# Patient Record
Sex: FEMALE | Race: WHITE | NOT HISPANIC OR LATINO | URBAN - METROPOLITAN AREA
[De-identification: names, ages, dates, MRNs, and addresses within clinical notes are randomized per-mention and may not be internally consistent; named-entity substitution may affect disease eponyms.]

---

## 2017-06-28 PROBLEM — Z00.00 ENCOUNTER FOR PREVENTIVE HEALTH EXAMINATION: Noted: 2017-06-28

## 2017-07-01 ENCOUNTER — OUTPATIENT (OUTPATIENT)
Dept: OUTPATIENT SERVICES | Facility: HOSPITAL | Age: 77
LOS: 1 days | End: 2017-07-01
Payer: MEDICAID

## 2017-07-03 ENCOUNTER — APPOINTMENT (OUTPATIENT)
Dept: VASCULAR SURGERY | Facility: CLINIC | Age: 77
End: 2017-07-03

## 2017-07-03 VITALS
TEMPERATURE: 98.2 F | SYSTOLIC BLOOD PRESSURE: 161 MMHG | WEIGHT: 117 LBS | HEART RATE: 91 BPM | HEIGHT: 60 IN | DIASTOLIC BLOOD PRESSURE: 77 MMHG | BODY MASS INDEX: 22.97 KG/M2

## 2017-07-05 RX ORDER — LOSARTAN POTASSIUM 25 MG/1
25 TABLET, FILM COATED ORAL
Refills: 0 | Status: ACTIVE | COMMUNITY

## 2017-07-05 RX ORDER — AMLODIPINE BESYLATE 5 MG/1
5 TABLET ORAL
Refills: 0 | Status: ACTIVE | COMMUNITY

## 2017-07-18 DIAGNOSIS — R69 ILLNESS, UNSPECIFIED: ICD-10-CM

## 2017-09-01 PROCEDURE — G9001: CPT

## 2017-09-27 ENCOUNTER — APPOINTMENT (OUTPATIENT)
Dept: VASCULAR SURGERY | Facility: CLINIC | Age: 77
End: 2017-09-27

## 2017-09-29 ENCOUNTER — APPOINTMENT (OUTPATIENT)
Dept: VASCULAR SURGERY | Facility: CLINIC | Age: 77
End: 2017-09-29

## 2018-03-12 ENCOUNTER — EMERGENCY (EMERGENCY)
Facility: HOSPITAL | Age: 78
LOS: 1 days | Discharge: HOME | End: 2018-03-12
Attending: EMERGENCY MEDICINE | Admitting: EMERGENCY MEDICINE
Payer: MEDICARE

## 2018-03-12 ENCOUNTER — MOBILE ON CALL (OUTPATIENT)
Age: 78
End: 2018-03-12

## 2018-03-12 ENCOUNTER — APPOINTMENT (OUTPATIENT)
Dept: VASCULAR SURGERY | Facility: CLINIC | Age: 78
End: 2018-03-12
Payer: MEDICARE

## 2018-03-12 VITALS
DIASTOLIC BLOOD PRESSURE: 82 MMHG | OXYGEN SATURATION: 100 % | HEART RATE: 92 BPM | RESPIRATION RATE: 18 BRPM | SYSTOLIC BLOOD PRESSURE: 162 MMHG

## 2018-03-12 VITALS
HEIGHT: 60 IN | DIASTOLIC BLOOD PRESSURE: 113 MMHG | TEMPERATURE: 98.5 F | WEIGHT: 117 LBS | HEART RATE: 109 BPM | SYSTOLIC BLOOD PRESSURE: 182 MMHG | BODY MASS INDEX: 22.97 KG/M2

## 2018-03-12 VITALS
OXYGEN SATURATION: 97 % | RESPIRATION RATE: 18 BRPM | TEMPERATURE: 98 F | SYSTOLIC BLOOD PRESSURE: 202 MMHG | DIASTOLIC BLOOD PRESSURE: 111 MMHG | HEART RATE: 94 BPM

## 2018-03-12 DIAGNOSIS — R51 HEADACHE: ICD-10-CM

## 2018-03-12 DIAGNOSIS — I10 ESSENTIAL (PRIMARY) HYPERTENSION: ICD-10-CM

## 2018-03-12 DIAGNOSIS — K40.90 UNILATERAL INGUINAL HERNIA, WITHOUT OBSTRUCTION OR GANGRENE, NOT SPECIFIED AS RECURRENT: Chronic | ICD-10-CM

## 2018-03-12 LAB
ALBUMIN SERPL ELPH-MCNC: 4.5 G/DL — SIGNIFICANT CHANGE UP (ref 3.3–5)
ALP SERPL-CCNC: 113 U/L — SIGNIFICANT CHANGE UP (ref 40–120)
ALT FLD-CCNC: 24 U/L — SIGNIFICANT CHANGE UP (ref 4–33)
APTT BLD: 29.8 SEC — SIGNIFICANT CHANGE UP (ref 27.5–37.4)
AST SERPL-CCNC: 32 U/L — SIGNIFICANT CHANGE UP (ref 4–32)
BASE EXCESS BLDV CALC-SCNC: 2 MMOL/L — SIGNIFICANT CHANGE UP
BASOPHILS # BLD AUTO: 0.04 K/UL — SIGNIFICANT CHANGE UP (ref 0–0.2)
BASOPHILS NFR BLD AUTO: 0.5 % — SIGNIFICANT CHANGE UP (ref 0–2)
BILIRUB SERPL-MCNC: 0.4 MG/DL — SIGNIFICANT CHANGE UP (ref 0.2–1.2)
BLOOD GAS VENOUS - CREATININE: 0.64 MG/DL — SIGNIFICANT CHANGE UP (ref 0.5–1.3)
BUN SERPL-MCNC: 11 MG/DL — SIGNIFICANT CHANGE UP (ref 7–23)
CALCIUM SERPL-MCNC: 9.3 MG/DL — SIGNIFICANT CHANGE UP (ref 8.4–10.5)
CHLORIDE BLDV-SCNC: 106 MMOL/L — SIGNIFICANT CHANGE UP (ref 96–108)
CHLORIDE SERPL-SCNC: 101 MMOL/L — SIGNIFICANT CHANGE UP (ref 98–107)
CO2 SERPL-SCNC: 27 MMOL/L — SIGNIFICANT CHANGE UP (ref 22–31)
CREAT SERPL-MCNC: 0.76 MG/DL — SIGNIFICANT CHANGE UP (ref 0.5–1.3)
EOSINOPHIL # BLD AUTO: 0.2 K/UL — SIGNIFICANT CHANGE UP (ref 0–0.5)
EOSINOPHIL NFR BLD AUTO: 2.6 % — SIGNIFICANT CHANGE UP (ref 0–6)
GAS PNL BLDV: 140 MMOL/L — SIGNIFICANT CHANGE UP (ref 136–146)
GLUCOSE BLDV-MCNC: 91 — SIGNIFICANT CHANGE UP (ref 70–99)
GLUCOSE SERPL-MCNC: 90 MG/DL — SIGNIFICANT CHANGE UP (ref 70–99)
HCO3 BLDV-SCNC: 26 MMOL/L — SIGNIFICANT CHANGE UP (ref 20–27)
HCT VFR BLD CALC: 41.2 % — SIGNIFICANT CHANGE UP (ref 34.5–45)
HCT VFR BLDV CALC: 43 % — SIGNIFICANT CHANGE UP (ref 34.5–45)
HGB BLD-MCNC: 13.6 G/DL — SIGNIFICANT CHANGE UP (ref 11.5–15.5)
HGB BLDV-MCNC: 14 G/DL — SIGNIFICANT CHANGE UP (ref 11.5–15.5)
IMM GRANULOCYTES # BLD AUTO: 0.03 # — SIGNIFICANT CHANGE UP
IMM GRANULOCYTES NFR BLD AUTO: 0.4 % — SIGNIFICANT CHANGE UP (ref 0–1.5)
INR BLD: 0.92 — SIGNIFICANT CHANGE UP (ref 0.88–1.17)
LACTATE BLDV-MCNC: 1.3 MMOL/L — SIGNIFICANT CHANGE UP (ref 0.5–2)
LYMPHOCYTES # BLD AUTO: 2.02 K/UL — SIGNIFICANT CHANGE UP (ref 1–3.3)
LYMPHOCYTES # BLD AUTO: 26 % — SIGNIFICANT CHANGE UP (ref 13–44)
MCHC RBC-ENTMCNC: 30.8 PG — SIGNIFICANT CHANGE UP (ref 27–34)
MCHC RBC-ENTMCNC: 33 % — SIGNIFICANT CHANGE UP (ref 32–36)
MCV RBC AUTO: 93.4 FL — SIGNIFICANT CHANGE UP (ref 80–100)
MONOCYTES # BLD AUTO: 0.67 K/UL — SIGNIFICANT CHANGE UP (ref 0–0.9)
MONOCYTES NFR BLD AUTO: 8.6 % — SIGNIFICANT CHANGE UP (ref 2–14)
NEUTROPHILS # BLD AUTO: 4.81 K/UL — SIGNIFICANT CHANGE UP (ref 1.8–7.4)
NEUTROPHILS NFR BLD AUTO: 61.9 % — SIGNIFICANT CHANGE UP (ref 43–77)
NRBC # FLD: 0 — SIGNIFICANT CHANGE UP
PCO2 BLDV: 43 MMHG — SIGNIFICANT CHANGE UP (ref 41–51)
PH BLDV: 7.4 PH — SIGNIFICANT CHANGE UP (ref 7.32–7.43)
PLATELET # BLD AUTO: 209 K/UL — SIGNIFICANT CHANGE UP (ref 150–400)
PMV BLD: 10.5 FL — SIGNIFICANT CHANGE UP (ref 7–13)
PO2 BLDV: 44 MMHG — HIGH (ref 35–40)
POTASSIUM BLDV-SCNC: 3.7 MMOL/L — SIGNIFICANT CHANGE UP (ref 3.4–4.5)
POTASSIUM SERPL-MCNC: 4 MMOL/L — SIGNIFICANT CHANGE UP (ref 3.5–5.3)
POTASSIUM SERPL-SCNC: 4 MMOL/L — SIGNIFICANT CHANGE UP (ref 3.5–5.3)
PROT SERPL-MCNC: 7.8 G/DL — SIGNIFICANT CHANGE UP (ref 6–8.3)
PROTHROM AB SERPL-ACNC: 10.2 SEC — SIGNIFICANT CHANGE UP (ref 9.8–13.1)
RBC # BLD: 4.41 M/UL — SIGNIFICANT CHANGE UP (ref 3.8–5.2)
RBC # FLD: 13.1 % — SIGNIFICANT CHANGE UP (ref 10.3–14.5)
SAO2 % BLDV: 78.1 % — SIGNIFICANT CHANGE UP (ref 60–85)
SODIUM SERPL-SCNC: 142 MMOL/L — SIGNIFICANT CHANGE UP (ref 135–145)
WBC # BLD: 7.77 K/UL — SIGNIFICANT CHANGE UP (ref 3.8–10.5)
WBC # FLD AUTO: 7.77 K/UL — SIGNIFICANT CHANGE UP (ref 3.8–10.5)

## 2018-03-12 PROCEDURE — 99213 OFFICE O/P EST LOW 20 MIN: CPT

## 2018-03-12 PROCEDURE — 70450 CT HEAD/BRAIN W/O DYE: CPT | Mod: 26

## 2018-03-12 PROCEDURE — 99285 EMERGENCY DEPT VISIT HI MDM: CPT | Mod: 25

## 2018-03-12 PROCEDURE — 93010 ELECTROCARDIOGRAM REPORT: CPT

## 2018-03-12 RX ORDER — METOCLOPRAMIDE HCL 10 MG
10 TABLET ORAL ONCE
Qty: 0 | Refills: 0 | Status: COMPLETED | OUTPATIENT
Start: 2018-03-12 | End: 2018-03-12

## 2018-03-12 RX ORDER — AMLODIPINE BESYLATE 2.5 MG/1
5 TABLET ORAL ONCE
Qty: 0 | Refills: 0 | Status: COMPLETED | OUTPATIENT
Start: 2018-03-12 | End: 2018-03-12

## 2018-03-12 RX ORDER — ACETAMINOPHEN 500 MG
650 TABLET ORAL ONCE
Qty: 0 | Refills: 0 | Status: COMPLETED | OUTPATIENT
Start: 2018-03-12 | End: 2018-03-12

## 2018-03-12 RX ADMIN — Medication 10 MILLIGRAM(S): at 19:39

## 2018-03-12 RX ADMIN — AMLODIPINE BESYLATE 5 MILLIGRAM(S): 2.5 TABLET ORAL at 19:39

## 2018-03-12 RX ADMIN — Medication 650 MILLIGRAM(S): at 19:39

## 2018-03-12 NOTE — CONSULT NOTE ADULT - PROBLEM SELECTOR RECOMMENDATION 9
Discharge from ED with plan for OTC headache control.   Instructed on not to take OTC everyday to avoid overuse headache.   Follow up with [address] [place] [address] neurology Dr. Chopra or first available.   Would consider outpatient MRI if headaches continue for concern for new-onset headache in older patient.

## 2018-03-12 NOTE — ED ADULT TRIAGE NOTE - CHIEF COMPLAINT QUOTE
pt BIBA from vascular doctor office.  pt sent to ED for evaluation of headache and hypertention.  pt denies chest pain

## 2018-03-12 NOTE — ED ADULT NURSE NOTE - OBJECTIVE STATEMENT
Pt received in #20, aaox3 with c/o headache. Pt was at her vascular doctor's office for consultation of her RLE when the pt was noted to be hypertensive. In addition, pt was complaining of HA which prompted pt's doctor to initiate EMS response. Pt denies cp, sob, nausea, vomiting, dizziness or alterations to vision. Pt on CM in SR, IV established, labs sent.

## 2018-03-12 NOTE — ED PROVIDER NOTE - OBJECTIVE STATEMENT
77 year old female c/o headache started slowly at MD's office now severe, no n/v. weakness or numbness, Notes episode of double vision last week .BP at that time 170/80. Doesn't get headaches often, noted /90 at PMD's office. takes amlodipine and lisinopril for BP, needs to take her amlodipine. Hx of varicose veins and Raynauds.  Denies fever, chills, chest pain or weakness.   Daughter states BP often elevated at PMDs and actually wound up inED in past for routine visit with elevated BP noted( anxious)

## 2018-03-12 NOTE — ED PROVIDER NOTE - MUSCULOSKELETAL, MLM
Spine appears normal, range of motion is not limited, no muscle or joint tenderness no edema, varicose veins noted

## 2018-03-12 NOTE — ED SUB INTERN NOTE - FAMILY HISTORY
Mother  Still living? Unknown  Family history of stroke, Age at diagnosis: Age Unknown     Father  Still living? Unknown  Family history of acute myocardial infarction, Age at diagnosis: Age Unknown

## 2018-03-12 NOTE — CONSULT NOTE ADULT - ASSESSMENT
77 year old female with HTN, Raynaud's who developed HTN while BP was increased at doctor's appt earlier today. Headache resolved with reglan. Had episode of blurred vision for 10 minutes last week, currently without visual changes.   Likely headache from high BP and is currently resolved. Unclear etiology of blurred vision episode (hypotension possibly).

## 2018-03-12 NOTE — ED PROVIDER NOTE - MEDICAL DECISION MAKING DETAILS
Headache and HTN, no neurodeficit, not concerning for SAH- will check labs, pain control, reassess BP , CT head

## 2018-03-12 NOTE — CONSULT NOTE ADULT - SUBJECTIVE AND OBJECTIVE BOX
Neurology Consult    Name  DAVID BASSETT    77 year old female with history of HTN and Raynaud's disease presents to the ED from her vascular surgeon office for evaluation for headache in the setting of increased blood pressure while she was feeling nervous at her appointment for varicose vein evaluation. When they took her blood pressure, it was above 200 systolic and they sent her in to the ED. Headache started around this time. No visual changes. No weakness or numbness. Of note, patient did have episode of 10 mins of blurred vision last week but has not had since. Does not typically get headaches. Took all of her BP meds today.   In ED patient got reglan and headache improved.      PAST MEDICAL & SURGICAL HISTORY:  Raynaud disease  Varicose vein of leg  Osteoporosis  Hypertension  Inguinal hernia          MEDICATIONS  (STANDING):    MEDICATIONS  (PRN):      Allergies    No Known Allergies    Intolerances        Objective  Vital Signs Last 24 Hrs  T(C): 36.9 (12 Mar 2018 15:53), Max: 36.9 (12 Mar 2018 15:53)  T(F): 98.4 (12 Mar 2018 15:53), Max: 98.4 (12 Mar 2018 15:53)  HR: 92 (12 Mar 2018 20:42) (92 - 97)  BP: 162/82 (12 Mar 2018 20:42) (162/82 - 217/94)  BP(mean): --  RR: 18 (12 Mar 2018 20:42) (17 - 18)  SpO2: 100% (12 Mar 2018 20:42) (97% - 100%)    General Exam   General appearance: No acute distress, well-nourished  Respiratory:    non-labored respirations               Neurological Exam  Mental Status:  alert and oriented x3, fluent speech, following commands    Cranial Nerves:  EOMI without nystagmus, visual fields intact, no facial droop, no dysarthria    Motor:   Tone:   normal               Strength:  Upper extremity                          Delt       Bicep    Tricep                                                  R         5/5        5/5        5/5       5/5                                               L          5/5        5/5        5/5      5/5    Lower extremity                           HF          KE             DF         PF                                               R        5/5        5/5       5/5       5/5                                               L         5/5        5/5        5/5        5/5    Pronator drift:   none           Dysmetria: none with finger-to-nose testing  Tremor:  none appreciated at rest or in action    Sensation: intact grossly to light touch        Other Studies                          13.6   7.77  )-----------( 209      ( 12 Mar 2018 18:50 )             41.2     03-12    142  |  101  |  11  ----------------------------<  90  4.0   |  27  |  0.76    Ca    9.3      12 Mar 2018 18:50    TPro  7.8  /  Alb  4.5  /  TBili  0.4  /  DBili  x   /  AST  32  /  ALT  24  /  AlkPhos  113  03-12    LIVER FUNCTIONS - ( 12 Mar 2018 18:50 )  Alb: 4.5 g/dL / Pro: 7.8 g/dL / ALK PHOS: 113 u/L / ALT: 24 u/L / AST: 32 u/L / GGT: x             Radiology    CTh: grossly negative, pending official read

## 2018-03-12 NOTE — ED SUB INTERN NOTE - NS MSEMN COMPLAINT FT
elevated BP of >210 systolic. Pt is 72 y F hx HTN (on amlodipine and losartan), osteoporosis, Raynaud's disease presenting with elevated BP of ~210/90. Patient went to appointment with vascular surgeon to evaluate for procedure for lower ext varicose veins. At her appointment, patient was found to have BP ~210's/90's. BP was repeated. Patient developed a headache starting at 1 pm. She describes headache as pressure-like, behind her eyes and radiating to her neck. The headache has since worsened, currently 10/10. She endorses photophobia. No nausea/vomiting. She has had not recent headaches. On Friday, she experience an episode of "seeing double," lasting <10 min three days ago. Her home measured BP at that time was ~170/90 three days ago. She is compliant with her home BP medication. She is on no blood thinner medications. No hx of recent trauma. elevated BP of >210 systolic. Pt is 72 y F hx HTN (on amlodipine and losartan), osteoporosis, Raynaud's disease presenting with elevated BP of ~210/90. Patient went to appointment with vascular surgeon to evaluate for procedure for lower ext varicose veins. At her appointment, patient was found to have BP ~210's/90's and referred to ED. BP was repeated. Patient developed a headache starting at 1 pm. She describes headache as pressure-like, behind her eyes and radiating to her neck. The headache has since worsened, currently 10/10. She endorses photophobia. No nausea/vomiting. She has had not recent headaches. On Friday, she experience an episode of "seeing double," lasting <10 min three days ago. Her home measured BP at that time was ~170/90 three days ago. She is compliant with her home BP medication. She is on no blood thinner medications. No hx of recent trauma.

## 2018-04-03 ENCOUNTER — APPOINTMENT (OUTPATIENT)
Dept: VASCULAR SURGERY | Facility: CLINIC | Age: 78
End: 2018-04-03
Payer: MEDICARE

## 2018-04-03 PROCEDURE — 37766 PHLEB VEINS - EXTREM 20+: CPT | Mod: RT

## 2018-04-03 PROCEDURE — 36475 ENDOVENOUS RF 1ST VEIN: CPT | Mod: RT

## 2018-04-06 ENCOUNTER — APPOINTMENT (OUTPATIENT)
Dept: VASCULAR SURGERY | Facility: CLINIC | Age: 78
End: 2018-04-06
Payer: MEDICARE

## 2018-04-06 PROCEDURE — 93971 EXTREMITY STUDY: CPT

## 2018-04-23 ENCOUNTER — APPOINTMENT (OUTPATIENT)
Dept: VASCULAR SURGERY | Facility: CLINIC | Age: 78
End: 2018-04-23
Payer: MEDICARE

## 2018-04-23 VITALS
DIASTOLIC BLOOD PRESSURE: 82 MMHG | WEIGHT: 115 LBS | HEART RATE: 96 BPM | SYSTOLIC BLOOD PRESSURE: 185 MMHG | BODY MASS INDEX: 22.58 KG/M2 | TEMPERATURE: 98 F | HEIGHT: 60 IN

## 2018-04-23 PROCEDURE — 99024 POSTOP FOLLOW-UP VISIT: CPT

## 2018-12-14 ENCOUNTER — INPATIENT (INPATIENT)
Facility: HOSPITAL | Age: 78
LOS: 2 days | Discharge: NURSING, REHAB OR LONG-TERM CARE | End: 2018-12-17
Attending: INTERNAL MEDICINE | Admitting: INTERNAL MEDICINE
Payer: MEDICARE

## 2018-12-14 VITALS
SYSTOLIC BLOOD PRESSURE: 149 MMHG | HEART RATE: 87 BPM | DIASTOLIC BLOOD PRESSURE: 88 MMHG | RESPIRATION RATE: 18 BRPM | TEMPERATURE: 98 F | WEIGHT: 113.1 LBS | OXYGEN SATURATION: 100 %

## 2018-12-14 DIAGNOSIS — K40.90 UNILATERAL INGUINAL HERNIA, WITHOUT OBSTRUCTION OR GANGRENE, NOT SPECIFIED AS RECURRENT: Chronic | ICD-10-CM

## 2018-12-14 PROBLEM — M81.0 AGE-RELATED OSTEOPOROSIS WITHOUT CURRENT PATHOLOGICAL FRACTURE: Chronic | Status: ACTIVE | Noted: 2018-03-12

## 2018-12-14 PROBLEM — I83.90 ASYMPTOMATIC VARICOSE VEINS OF UNSPECIFIED LOWER EXTREMITY: Chronic | Status: ACTIVE | Noted: 2018-03-12

## 2018-12-14 PROBLEM — I73.00 RAYNAUD'S SYNDROME WITHOUT GANGRENE: Chronic | Status: ACTIVE | Noted: 2018-03-12

## 2018-12-14 PROBLEM — I10 ESSENTIAL (PRIMARY) HYPERTENSION: Chronic | Status: ACTIVE | Noted: 2018-03-12

## 2018-12-14 LAB
ALBUMIN SERPL ELPH-MCNC: 3.7 G/DL — SIGNIFICANT CHANGE UP (ref 3.3–5)
ALP SERPL-CCNC: 114 U/L — SIGNIFICANT CHANGE UP (ref 40–120)
ALT FLD-CCNC: 33 U/L — SIGNIFICANT CHANGE UP (ref 12–78)
ANION GAP SERPL CALC-SCNC: 9 MMOL/L — SIGNIFICANT CHANGE UP (ref 5–17)
APTT BLD: 28.3 SEC — LOW (ref 28.5–37)
AST SERPL-CCNC: 31 U/L — SIGNIFICANT CHANGE UP (ref 15–37)
BILIRUB SERPL-MCNC: 0.4 MG/DL — SIGNIFICANT CHANGE UP (ref 0.2–1.2)
BLD GP AB SCN SERPL QL: SIGNIFICANT CHANGE UP
BUN SERPL-MCNC: 23 MG/DL — SIGNIFICANT CHANGE UP (ref 7–23)
CALCIUM SERPL-MCNC: 8.9 MG/DL — SIGNIFICANT CHANGE UP (ref 8.5–10.1)
CHLORIDE SERPL-SCNC: 105 MMOL/L — SIGNIFICANT CHANGE UP (ref 96–108)
CO2 SERPL-SCNC: 26 MMOL/L — SIGNIFICANT CHANGE UP (ref 22–31)
CREAT SERPL-MCNC: 0.87 MG/DL — SIGNIFICANT CHANGE UP (ref 0.5–1.3)
GLUCOSE SERPL-MCNC: 96 MG/DL — SIGNIFICANT CHANGE UP (ref 70–99)
HCT VFR BLD CALC: 41.3 % — SIGNIFICANT CHANGE UP (ref 34.5–45)
HGB BLD-MCNC: 13.4 G/DL — SIGNIFICANT CHANGE UP (ref 11.5–15.5)
INR BLD: 0.94 RATIO — SIGNIFICANT CHANGE UP (ref 0.88–1.16)
MCHC RBC-ENTMCNC: 30.9 PG — SIGNIFICANT CHANGE UP (ref 27–34)
MCHC RBC-ENTMCNC: 32.4 GM/DL — SIGNIFICANT CHANGE UP (ref 32–36)
MCV RBC AUTO: 95.4 FL — SIGNIFICANT CHANGE UP (ref 80–100)
NRBC # BLD: 0 /100 WBCS — SIGNIFICANT CHANGE UP (ref 0–0)
PLATELET # BLD AUTO: 226 K/UL — SIGNIFICANT CHANGE UP (ref 150–400)
POTASSIUM SERPL-MCNC: 3.7 MMOL/L — SIGNIFICANT CHANGE UP (ref 3.5–5.3)
POTASSIUM SERPL-SCNC: 3.7 MMOL/L — SIGNIFICANT CHANGE UP (ref 3.5–5.3)
PROT SERPL-MCNC: 7.8 GM/DL — SIGNIFICANT CHANGE UP (ref 6–8.3)
PROTHROM AB SERPL-ACNC: 10.5 SEC — SIGNIFICANT CHANGE UP (ref 10–12.9)
RBC # BLD: 4.33 M/UL — SIGNIFICANT CHANGE UP (ref 3.8–5.2)
RBC # FLD: 13.1 % — SIGNIFICANT CHANGE UP (ref 10.3–14.5)
SODIUM SERPL-SCNC: 140 MMOL/L — SIGNIFICANT CHANGE UP (ref 135–145)
WBC # BLD: 13.81 K/UL — HIGH (ref 3.8–10.5)
WBC # FLD AUTO: 13.81 K/UL — HIGH (ref 3.8–10.5)

## 2018-12-14 PROCEDURE — 73502 X-RAY EXAM HIP UNI 2-3 VIEWS: CPT | Mod: 26,LT

## 2018-12-14 PROCEDURE — 99222 1ST HOSP IP/OBS MODERATE 55: CPT | Mod: AI

## 2018-12-14 PROCEDURE — 71045 X-RAY EXAM CHEST 1 VIEW: CPT | Mod: 26

## 2018-12-14 PROCEDURE — 99285 EMERGENCY DEPT VISIT HI MDM: CPT

## 2018-12-14 PROCEDURE — 72100 X-RAY EXAM L-S SPINE 2/3 VWS: CPT | Mod: 26

## 2018-12-14 RX ORDER — ONDANSETRON 8 MG/1
4 TABLET, FILM COATED ORAL ONCE
Qty: 0 | Refills: 0 | Status: COMPLETED | OUTPATIENT
Start: 2018-12-14 | End: 2018-12-14

## 2018-12-14 RX ORDER — ENOXAPARIN SODIUM 100 MG/ML
40 INJECTION SUBCUTANEOUS EVERY 24 HOURS
Qty: 0 | Refills: 0 | Status: DISCONTINUED | OUTPATIENT
Start: 2018-12-14 | End: 2018-12-17

## 2018-12-14 RX ORDER — HYDROMORPHONE HYDROCHLORIDE 2 MG/ML
1 INJECTION INTRAMUSCULAR; INTRAVENOUS; SUBCUTANEOUS ONCE
Qty: 0 | Refills: 0 | Status: DISCONTINUED | OUTPATIENT
Start: 2018-12-14 | End: 2018-12-14

## 2018-12-14 RX ORDER — SODIUM CHLORIDE 9 MG/ML
3 INJECTION INTRAMUSCULAR; INTRAVENOUS; SUBCUTANEOUS EVERY 8 HOURS
Qty: 0 | Refills: 0 | Status: DISCONTINUED | OUTPATIENT
Start: 2018-12-14 | End: 2018-12-17

## 2018-12-14 RX ORDER — SODIUM CHLORIDE 9 MG/ML
1000 INJECTION INTRAMUSCULAR; INTRAVENOUS; SUBCUTANEOUS
Qty: 0 | Refills: 0 | Status: DISCONTINUED | OUTPATIENT
Start: 2018-12-14 | End: 2018-12-17

## 2018-12-14 RX ADMIN — ONDANSETRON 4 MILLIGRAM(S): 8 TABLET, FILM COATED ORAL at 21:23

## 2018-12-14 RX ADMIN — HYDROMORPHONE HYDROCHLORIDE 1 MILLIGRAM(S): 2 INJECTION INTRAMUSCULAR; INTRAVENOUS; SUBCUTANEOUS at 17:53

## 2018-12-14 RX ADMIN — SODIUM CHLORIDE 80 MILLILITER(S): 9 INJECTION INTRAMUSCULAR; INTRAVENOUS; SUBCUTANEOUS at 21:24

## 2018-12-14 RX ADMIN — SODIUM CHLORIDE 80 MILLILITER(S): 9 INJECTION INTRAMUSCULAR; INTRAVENOUS; SUBCUTANEOUS at 17:24

## 2018-12-14 RX ADMIN — SODIUM CHLORIDE 3 MILLILITER(S): 9 INJECTION INTRAMUSCULAR; INTRAVENOUS; SUBCUTANEOUS at 21:24

## 2018-12-14 RX ADMIN — HYDROMORPHONE HYDROCHLORIDE 1 MILLIGRAM(S): 2 INJECTION INTRAMUSCULAR; INTRAVENOUS; SUBCUTANEOUS at 21:22

## 2018-12-14 RX ADMIN — HYDROMORPHONE HYDROCHLORIDE 1 MILLIGRAM(S): 2 INJECTION INTRAMUSCULAR; INTRAVENOUS; SUBCUTANEOUS at 21:52

## 2018-12-14 RX ADMIN — HYDROMORPHONE HYDROCHLORIDE 1 MILLIGRAM(S): 2 INJECTION INTRAMUSCULAR; INTRAVENOUS; SUBCUTANEOUS at 17:23

## 2018-12-14 NOTE — H&P ADULT - HISTORY OF PRESENT ILLNESS
Pt is a 77 yo Female w/ PMHx of HTN, Osteoporosis, Raynaud's who presents with L hip pain after sustaining mechanical fall on wet floor while at store. Pt states she slipped in the store and landed on her L hip. She was unable to bear weight due to pain. She denies any loc, no head trauma, pt denies any fever, chills, sob, cp, palpitations, n./v/d/c no travels or sick contacts. she was doing well prior to this event. pt w/hx of fall last yr and vertebral fx that was managed conservatively.

## 2018-12-14 NOTE — H&P ADULT - PROBLEM SELECTOR PLAN 1
admit to medicine  pain managment  appreciated ortho and PT consults  social work consult   for garrett

## 2018-12-14 NOTE — H&P ADULT - NSHPPHYSICALEXAM_GEN_ALL_CORE
Vital Signs Last 24 Hrs  T(C): 36.7 (14 Dec 2018 15:23), Max: 36.7 (14 Dec 2018 15:23)  T(F): 98 (14 Dec 2018 15:23), Max: 98 (14 Dec 2018 15:23)  HR: 83 (14 Dec 2018 19:40) (83 - 87)  BP: 129/65 (14 Dec 2018 19:40) (129/65 - 149/88)  BP(mean): --  RR: 18 (14 Dec 2018 15:23) (18 - 18)  SpO2: 100% (14 Dec 2018 15:23) (100% - 100%)    PHYSICAL EXAM:    GENERAL: NAD, well-groomed, well-developed  HEAD:  Atraumatic, Normocephalic  EYES: EOMI, PERRLA, conjunctiva and sclera clear  ENMT: No tonsillar erythema, exudates, or enlargement; Moist mucous membranes, No lesions  NECK: Supple, No JVD, Normal thyroid  NERVOUS SYSTEM:  Alert & Oriented X3, Good concentration; Motor Strength 5/5 B/L upper extremeties and R lower extremitie, Left able to move but not tested bc of pain  CHEST/LUNG: Clear to percussion bilaterally; No rales, rhonchi, wheezing, or rubs  HEART: Regular rate and rhythm; No rubs, or gallops, +S1,S2  ABDOMEN: Soft, Nontender, Nondistended; Bowel sounds present  EXTREMITIES:  2+ Peripheral Pulses, No clubbing, cyanosis, or edema  LYMPH: No cervical adenopathy  RECTAL: deferred  BREAST: No palpatble masses, skin no lesions   : deferred  SKIN: No rashes or lesions    IMPROVE VTE Individual Risk Assessment          RISK                                                          Points  [  ] Previous VTE                                                3  [  ] Thrombophilia                                             2  [  ] Lower limb paralysis                                    2        (unable to hold up >15 seconds)    [  ] Current Cancer                                             2         (within 6 months)  [  x] Immobilization > 24 hrs                              1  [  ] ICU/CCU stay > 24 hours                            1  [ x ] Age > 60                                                    1  IMPROVE VTE Score ____2_____

## 2018-12-14 NOTE — ED ADULT NURSE NOTE - OBJECTIVE STATEMENT
Pt. is received in bed 11. Pt is alert and oriented x3. Pt states" slipped on left leg and hurt left side." Pt. c/o of left hip pain. Pt. denies hitting head, LOC, HA. dizziness,

## 2018-12-14 NOTE — PHYSICAL THERAPY INITIAL EVALUATION ADULT - CRITERIA FOR SKILLED THERAPEUTIC INTERVENTIONS
anticipated discharge recommendation/predicted duration of therapy intervention/functional limitations in following categories/risk reduction/prevention/impairments found/rehab potential/therapy frequency

## 2018-12-14 NOTE — PHYSICAL THERAPY INITIAL EVALUATION ADULT - ADDITIONAL COMMENTS
As per patient, she lives in a private house with 1 step to enter, no rail, and 1 step to negotiate inside with no rail and bedroom is on the 2nd floor, 1 flight of stairs up. Patient reports she did not use an assistive device prior.

## 2018-12-14 NOTE — PHYSICAL THERAPY INITIAL EVALUATION ADULT - STRENGTHENING, PT EVAL
Patient will improve strength in B LE by 1 grade to improve overall functional mobility including gait, transfers, bed mobility and decrease risk of falls

## 2018-12-14 NOTE — PHYSICAL THERAPY INITIAL EVALUATION ADULT - GAIT DEVIATIONS NOTED, PT EVAL
decreased manuel/decreased stride length/decreased weight-shifting ability/decreased step length/increased stride width/decreased swing-to-stance ratio

## 2018-12-14 NOTE — CONSULT NOTE ADULT - SUBJECTIVE AND OBJECTIVE BOX
HPI:  79 yo F w/ PMHx of HTN, Osteoporosis, Raynaud's who presents with L hip pain after sustaining mechanical fall. Pt states she slipped in the store and landed on her L hip. She was unable to bear weight due to pain. Denies head trauma, LOC, any other injuries. No numbness or tingling. Pain worsened and she was brought to the ED. Pt ambulates independently at baseline. Pt denies any orthopedic surgeries in the past. Not on any a/c. No fevers, chills, sob, cp, n/v.    PAST MEDICAL & SURGICAL HISTORY:  Raynaud disease  Varicose vein of leg  Osteoporosis  Hypertension  Inguinal hernia    Home Medications:    Allergies    No Known Allergies    Intolerances    Vital Signs Last 24 Hrs  T(C): 36.7 (14 Dec 2018 15:23), Max: 36.7 (14 Dec 2018 15:23)  T(F): 98 (14 Dec 2018 15:23), Max: 98 (14 Dec 2018 15:23)  HR: 87 (14 Dec 2018 15:23) (87 - 87)  BP: 149/88 (14 Dec 2018 15:23) (149/88 - 149/88)  BP(mean): --  RR: 18 (14 Dec 2018 15:23) (18 - 18)  SpO2: 100% (14 Dec 2018 15:23) (100% - 100%)    PE:  Gen: NAD with daughter at bedside  LLE:  Skin intact, no erythema, ecchymoses, swelling  Compartments soft and compressible  Able to SLR with pain   No calf ttp  Neg log roll/axial loading  No ttp along bony prominences of hip/knee/ankle/feet  pain with PROM  Pain with palpation of pubic symphysis  +EHL/FHL/Gsc/TA  SILT L2-S1  2+ DP    Secondary Survey:  No head trauma  No ttp along c/t/l/s spine  No ttp along bony prominences diffusely  A/PROM intact in joints diffusely, other than mentioned above  SILT diffusely  NVI diffusely  Compartments soft and compressible diffusely    XRay AP Pelvis: Left sup/inf pubic rami fx

## 2018-12-14 NOTE — CONSULT NOTE ADULT - ASSESSMENT
A/P:  77 yo F s/p MF with L sup/inf pubic rami fx:  -WBAT/PT evaluation  -pain control  -dvt ppx - recommend ASA BID for 30 days  -Ice and rest  -recommend intranasal calcitonin for osteoporosis  -no acute orthopedic surgical intervention at this time  -ortho stable for DC  -will discuss with Dr. Collazo and update plan if any changes

## 2018-12-14 NOTE — ED PROVIDER NOTE - CARE PLAN
Principal Discharge DX:	Pelvic fracture  Assessment and plan of treatment:	admit for pain management and LEIGHA

## 2018-12-14 NOTE — PHYSICAL THERAPY INITIAL EVALUATION ADULT - BALANCE TRAINING, PT EVAL
Patient will improve standing balance by 1 grade with rolling walker to decrease risk of falls in 2 weeks.

## 2018-12-14 NOTE — H&P ADULT - ASSESSMENT
77 yo Female w/ PMHx of HTN, Osteoporosis, Raynaud's w/mechanical fall and left superior pubic rami fx.

## 2018-12-14 NOTE — H&P ADULT - NSHPLABSRESULTS_GEN_ALL_CORE
LABS:                        13.4   13.81 )-----------( 226      ( 14 Dec 2018 17:30 )             41.3     12-14    140  |  105  |  23  ----------------------------<  96  3.7   |  26  |  0.87    Ca    8.9      14 Dec 2018 17:30    TPro  7.8  /  Alb  3.7  /  TBili  0.4  /  DBili  x   /  AST  31  /  ALT  33  /  AlkPhos  114  12-14    PT/INR - ( 14 Dec 2018 17:30 )   PT: 10.5 sec;   INR: 0.94 ratio         PTT - ( 14 Dec 2018 17:30 )  PTT:28.3 sec    CAPILLARY BLOOD GLUCOSE          RADIOLOGY & ADDITIONAL TESTS:    Imaging Personally Reviewed:  [ X] YES  [ ] NO

## 2018-12-14 NOTE — ED PROVIDER NOTE - OBJECTIVE STATEMENT
HPI, PMHX, PSHX , allergies as contained Herein;.  patient had  a mechanical fall and fell backwards in a shop and injured her left hip, Cannot wt bear on left hip. Denies LOC, No chest pains No N/V/D. No fever. . has back pains also. has hx of HTn, L@ fx from osteoporosis and is on Prolia. . No cardiac hx . No hx of CVa.   No allergies.. No social issues

## 2018-12-14 NOTE — PHYSICAL THERAPY INITIAL EVALUATION ADULT - PERTINENT HX OF CURRENT PROBLEM, REHAB EVAL
Patient admitted s/p mechanical fall, fell backward while in a shop and injured L hip and then was not able to weight bear on L hip. X-ray found to have L superior pubic rami fracture.

## 2018-12-14 NOTE — PHYSICAL THERAPY INITIAL EVALUATION ADULT - STANDING BALANCE: DYNAMIC, REHAB EVAL
with rolling walker, at times patient attempted to let go of rolling walker and had loss of balance, able to recover with minimal assistance and grabbing walker/fair minus

## 2018-12-14 NOTE — CONSULT NOTE ADULT - ATTENDING COMMENTS
78f with left inf and sup pubic rami fracture.  Seen and exam the patient with the resident.  agree with plan as stated above. patient is wbat. recommend f/up with pmd for proper osteoporosis treatment and medication.  Patient can follow up with ortho in office 2 weeks after discharge from hospital.

## 2018-12-15 DIAGNOSIS — S32.030A WEDGE COMPRESSION FRACTURE OF THIRD LUMBAR VERTEBRA, INITIAL ENCOUNTER FOR CLOSED FRACTURE: ICD-10-CM

## 2018-12-15 DIAGNOSIS — I73.00 RAYNAUD'S SYNDROME WITHOUT GANGRENE: ICD-10-CM

## 2018-12-15 DIAGNOSIS — S32.9XXA FRACTURE OF UNSPECIFIED PARTS OF LUMBOSACRAL SPINE AND PELVIS, INITIAL ENCOUNTER FOR CLOSED FRACTURE: ICD-10-CM

## 2018-12-15 DIAGNOSIS — M81.0 AGE-RELATED OSTEOPOROSIS WITHOUT CURRENT PATHOLOGICAL FRACTURE: ICD-10-CM

## 2018-12-15 DIAGNOSIS — Z29.9 ENCOUNTER FOR PROPHYLACTIC MEASURES, UNSPECIFIED: ICD-10-CM

## 2018-12-15 DIAGNOSIS — I10 ESSENTIAL (PRIMARY) HYPERTENSION: ICD-10-CM

## 2018-12-15 LAB
APPEARANCE UR: CLEAR — SIGNIFICANT CHANGE UP
BACTERIA # UR AUTO: ABNORMAL
BILIRUB UR-MCNC: NEGATIVE — SIGNIFICANT CHANGE UP
COLOR SPEC: YELLOW — SIGNIFICANT CHANGE UP
DIFF PNL FLD: NEGATIVE — SIGNIFICANT CHANGE UP
GLUCOSE UR QL: NEGATIVE MG/DL — SIGNIFICANT CHANGE UP
KETONES UR-MCNC: ABNORMAL
LEUKOCYTE ESTERASE UR-ACNC: ABNORMAL
NITRITE UR-MCNC: NEGATIVE — SIGNIFICANT CHANGE UP
PH UR: 6 — SIGNIFICANT CHANGE UP (ref 5–8)
PROT UR-MCNC: NEGATIVE MG/DL — SIGNIFICANT CHANGE UP
SP GR SPEC: 1.02 — SIGNIFICANT CHANGE UP (ref 1.01–1.02)
UROBILINOGEN FLD QL: NEGATIVE MG/DL — SIGNIFICANT CHANGE UP
WBC UR QL: SIGNIFICANT CHANGE UP

## 2018-12-15 PROCEDURE — 99233 SBSQ HOSP IP/OBS HIGH 50: CPT

## 2018-12-15 RX ORDER — ONDANSETRON 8 MG/1
4 TABLET, FILM COATED ORAL EVERY 6 HOURS
Qty: 0 | Refills: 0 | Status: DISCONTINUED | OUTPATIENT
Start: 2018-12-15 | End: 2018-12-17

## 2018-12-15 RX ORDER — MORPHINE SULFATE 50 MG/1
2 CAPSULE, EXTENDED RELEASE ORAL EVERY 4 HOURS
Qty: 0 | Refills: 0 | Status: DISCONTINUED | OUTPATIENT
Start: 2018-12-15 | End: 2018-12-16

## 2018-12-15 RX ORDER — LOSARTAN POTASSIUM 100 MG/1
100 TABLET, FILM COATED ORAL DAILY
Qty: 0 | Refills: 0 | Status: DISCONTINUED | OUTPATIENT
Start: 2018-12-15 | End: 2018-12-15

## 2018-12-15 RX ORDER — LOSARTAN POTASSIUM 100 MG/1
100 TABLET, FILM COATED ORAL DAILY
Qty: 0 | Refills: 0 | Status: DISCONTINUED | OUTPATIENT
Start: 2018-12-15 | End: 2018-12-17

## 2018-12-15 RX ORDER — AMLODIPINE BESYLATE 2.5 MG/1
5 TABLET ORAL DAILY
Qty: 0 | Refills: 0 | Status: DISCONTINUED | OUTPATIENT
Start: 2018-12-15 | End: 2018-12-17

## 2018-12-15 RX ADMIN — LOSARTAN POTASSIUM 100 MILLIGRAM(S): 100 TABLET, FILM COATED ORAL at 11:41

## 2018-12-15 RX ADMIN — SODIUM CHLORIDE 80 MILLILITER(S): 9 INJECTION INTRAMUSCULAR; INTRAVENOUS; SUBCUTANEOUS at 05:21

## 2018-12-15 RX ADMIN — ENOXAPARIN SODIUM 40 MILLIGRAM(S): 100 INJECTION SUBCUTANEOUS at 00:27

## 2018-12-15 RX ADMIN — SODIUM CHLORIDE 3 MILLILITER(S): 9 INJECTION INTRAMUSCULAR; INTRAVENOUS; SUBCUTANEOUS at 05:14

## 2018-12-15 RX ADMIN — AMLODIPINE BESYLATE 5 MILLIGRAM(S): 2.5 TABLET ORAL at 05:21

## 2018-12-15 RX ADMIN — MORPHINE SULFATE 2 MILLIGRAM(S): 50 CAPSULE, EXTENDED RELEASE ORAL at 22:26

## 2018-12-15 RX ADMIN — SODIUM CHLORIDE 3 MILLILITER(S): 9 INJECTION INTRAMUSCULAR; INTRAVENOUS; SUBCUTANEOUS at 13:24

## 2018-12-15 RX ADMIN — SODIUM CHLORIDE 3 MILLILITER(S): 9 INJECTION INTRAMUSCULAR; INTRAVENOUS; SUBCUTANEOUS at 22:17

## 2018-12-15 NOTE — PROGRESS NOTE ADULT - SUBJECTIVE AND OBJECTIVE BOX
Patient is a 78y old  Female who presents with a chief complaint of fall (14 Dec 2018 22:27)      OVERNIGHT EVENTS:      REVIEW OF SYSTEMS: denies chest pain/SOB, diaphoresis, no F/C, cough, dizziness, headache, blurry vision, nausea, vomiting, abdominal pain. Rest unremarkable     MEDICATIONS  (STANDING):  amLODIPine   Tablet 5 milliGRAM(s) Oral daily  enoxaparin Injectable 40 milliGRAM(s) SubCutaneous every 24 hours  losartan 100 milliGRAM(s) Oral daily  sodium chloride 0.9% lock flush 3 milliLiter(s) IV Push every 8 hours  sodium chloride 0.9%. 1000 milliLiter(s) (80 mL/Hr) IV Continuous <Continuous>    MEDICATIONS  (PRN):  morphine  - Injectable 2 milliGRAM(s) IV Push every 4 hours PRN Mild Pain (1 - 3)  ondansetron Injectable 4 milliGRAM(s) IV Push every 6 hours PRN Nausea and/or Vomiting      Allergies    No Known Allergies    Intolerances        SUBJECTIVE: in bed in NAD, no acute events overnight     T(F): 97.2 (12-15-18 @ 12:34), Max: 98.2 (12-15-18 @ 00:37)  HR: 75 (12-15-18 @ 12:34) (73 - 87)  BP: 117/59 (12-15-18 @ 12:34) (105/57 - 157/108)  RR: 16 (12-15-18 @ 12:34) (16 - 18)  SpO2: 96% (12-15-18 @ 12:34) (95% - 100%)  Wt(kg): --    PHYSICAL EXAM:  GENERAL: NAD, well-groomed, well-developed  HEAD:  Atraumatic, Normocephalic  EYES: EOMI, PERRLA, conjunctiva and sclera clear  ENMT: No tonsillar erythema, exudates, or enlargement; Moist mucous membranes, Good dentition, No lesions  NECK: Supple, No JVD, Normal thyroid  CHEST/LUNG: Clear to  auscultation bilaterally; No rales, rhonchi, wheezing, or rubs  bilaterally  HEART: Regular rate and rhythm; No murmurs, rubs, or gallops  ABDOMEN: Soft, Nontender, Nondistended; Bowel sounds present  EXTREMITIES:  2+ Peripheral Pulses, No clubbing, cyanosis, or edema BL LE    SKIN: No rashes or lesions  NERVOUS SYSTEM:  Alert & Oriented X3, Good concentration; Motor Strength 5/5 B/L upper and lower extremities;   DTRs 2+ intact and symmetric, sensation intact BL    LABS:                        13.4   13.81 )-----------( 226      ( 14 Dec 2018 17:30 )             41.3     12-    140  |  105  |  23  ----------------------------<  96  3.7   |  26  |  0.87    Ca    8.9      14 Dec 2018 17:30    TPro  7.8  /  Alb  3.7  /  TBili  0.4  /  DBili  x   /  AST  31  /  ALT  33  /  AlkPhos  114  12-14    PT/INR - ( 14 Dec 2018 17:30 )   PT: 10.5 sec;   INR: 0.94 ratio         PTT - ( 14 Dec 2018 17:30 )  PTT:28.3 sec  Urinalysis Basic - ( 15 Dec 2018 00:14 )    Color: Yellow / Appearance: Clear / S.020 / pH: x  Gluc: x / Ketone: Small  / Bili: Negative / Urobili: Negative mg/dL   Blood: x / Protein: Negative mg/dL / Nitrite: Negative   Leuk Esterase: Trace / RBC: x / WBC 0-2   Sq Epi: x / Non Sq Epi: x / Bacteria: Occasional      Cultures;   CAPILLARY BLOOD GLUCOSE    CAPILLARY BLOOD GLUCOSE          Lipid panel:         RADIOLOGY & ADDITIONAL TESTS:      Imaging Personally Reviewed:  [ x] YES      Consultant(s) Notes Reviewed:  [x ] YES     Care Discussed with [x ] Consultants [X ] Patient [x ] Family  [x ]    [x ]  Other; RN Patient is a 78y old  Female who presents with a chief complaint of fall (14 Dec 2018 22:27)      OVERNIGHT EVENTS: none      REVIEW OF SYSTEMS: denies chest pain/SOB, diaphoresis, no F/C, cough, dizziness, headache, blurry vision, nausea, vomiting, abdominal pain. Rest unremarkable     MEDICATIONS  (STANDING):  amLODIPine   Tablet 5 milliGRAM(s) Oral daily  enoxaparin Injectable 40 milliGRAM(s) SubCutaneous every 24 hours  losartan 100 milliGRAM(s) Oral daily  sodium chloride 0.9% lock flush 3 milliLiter(s) IV Push every 8 hours  sodium chloride 0.9%. 1000 milliLiter(s) (80 mL/Hr) IV Continuous <Continuous>    MEDICATIONS  (PRN):  morphine  - Injectable 2 milliGRAM(s) IV Push every 4 hours PRN Mild Pain (1 - 3)  ondansetron Injectable 4 milliGRAM(s) IV Push every 6 hours PRN Nausea and/or Vomiting      Allergies    No Known Allergies    Intolerances        SUBJECTIVE: in bed in NAD, no acute events overnight     T(F): 97.2 (12-15-18 @ 12:34), Max: 98.2 (12-15-18 @ 00:37)  HR: 75 (12-15-18 @ 12:34) (73 - 87)  BP: 117/59 (12-15-18 @ 12:34) (105/57 - 157/108)  RR: 16 (12-15-18 @ 12:34) (16 - 18)  SpO2: 96% (12-15-18 @ 12:34) (95% - 100%)  Wt(kg): --    PHYSICAL EXAM:  GENERAL: NAD, well-groomed, well-developed  HEAD:  Atraumatic, Normocephalic  EYES: EOMI, PERRLA, conjunctiva and sclera clear  ENMT: No tonsillar erythema, exudates, or enlargement; Moist mucous membranes, Good dentition, No lesions  NECK: Supple, No JVD, Normal thyroid  CHEST/LUNG: Clear to  auscultation bilaterally; No rales, rhonchi, wheezing, or rubs  bilaterally  HEART: Regular rate and rhythm; No murmurs, rubs, or gallops  ABDOMEN: Soft, Nontender, Nondistended; Bowel sounds present  EXTREMITIES:  2+ Peripheral Pulses, No clubbing, cyanosis, or edema BL LE   [ain in hip/pelvis with slr more on left than right     SKIN: No rashes or lesions  NERVOUS SYSTEM:  Alert & Oriented X3, Good concentration; Motor Strength 5/5 B/L upper and lower extremities;   DTRs 2+ intact and symmetric, sensation intact BL    LABS:                        13.4   13.81 )-----------( 226      ( 14 Dec 2018 17:30 )             41.3     12-14    140  |  105  |  23  ----------------------------<  96  3.7   |  26  |  0.87    Ca    8.9      14 Dec 2018 17:30    TPro  7.8  /  Alb  3.7  /  TBili  0.4  /  DBili  x   /  AST  31  /  ALT  33  /  AlkPhos  114  12-14    PT/INR - ( 14 Dec 2018 17:30 )   PT: 10.5 sec;   INR: 0.94 ratio         PTT - ( 14 Dec 2018 17:30 )  PTT:28.3 sec  Urinalysis Basic - ( 15 Dec 2018 00:14 )    Color: Yellow / Appearance: Clear / S.020 / pH: x  Gluc: x / Ketone: Small  / Bili: Negative / Urobili: Negative mg/dL   Blood: x / Protein: Negative mg/dL / Nitrite: Negative   Leuk Esterase: Trace / RBC: x / WBC 0-2   Sq Epi: x / Non Sq Epi: x / Bacteria: Occasional      Cultures;   CAPILLARY BLOOD GLUCOSE    CAPILLARY BLOOD GLUCOSE          Lipid panel:         RADIOLOGY & ADDITIONAL TESTS:      Imaging Personally Reviewed:  [ x] YES      Consultant(s) Notes Reviewed:  [x ] YES     Care Discussed with [x ] Consultants [X ] Patient [x ] Family  [x ]    [x ]  Other; RN

## 2018-12-15 NOTE — PROGRESS NOTE ADULT - PROBLEM SELECTOR PLAN 6
unclear if old or new per documentation hd a fracture last year  treated conservatively    based off xray ? acute    may need mri unclear if old or new per documentation hd a fracture last year  treated conservatively    based off xray ? acute     will get MRI screeing form completed by myself and in chart

## 2018-12-16 DIAGNOSIS — E83.39 OTHER DISORDERS OF PHOSPHORUS METABOLISM: ICD-10-CM

## 2018-12-16 LAB
ALBUMIN SERPL ELPH-MCNC: 3.1 G/DL — LOW (ref 3.3–5)
ALP SERPL-CCNC: 92 U/L — SIGNIFICANT CHANGE UP (ref 40–120)
ALT FLD-CCNC: 23 U/L — SIGNIFICANT CHANGE UP (ref 12–78)
ANION GAP SERPL CALC-SCNC: 8 MMOL/L — SIGNIFICANT CHANGE UP (ref 5–17)
ANION GAP SERPL CALC-SCNC: 9 MMOL/L — SIGNIFICANT CHANGE UP (ref 5–17)
APPEARANCE UR: CLEAR — SIGNIFICANT CHANGE UP
AST SERPL-CCNC: 20 U/L — SIGNIFICANT CHANGE UP (ref 15–37)
BACTERIA # UR AUTO: ABNORMAL
BASOPHILS # BLD AUTO: 0.04 K/UL — SIGNIFICANT CHANGE UP (ref 0–0.2)
BASOPHILS NFR BLD AUTO: 0.4 % — SIGNIFICANT CHANGE UP (ref 0–2)
BILIRUB SERPL-MCNC: 0.5 MG/DL — SIGNIFICANT CHANGE UP (ref 0.2–1.2)
BILIRUB UR-MCNC: NEGATIVE — SIGNIFICANT CHANGE UP
BUN SERPL-MCNC: 13 MG/DL — SIGNIFICANT CHANGE UP (ref 7–23)
BUN SERPL-MCNC: 14 MG/DL — SIGNIFICANT CHANGE UP (ref 7–23)
CALCIUM SERPL-MCNC: 7.9 MG/DL — LOW (ref 8.5–10.1)
CALCIUM SERPL-MCNC: 8.6 MG/DL — SIGNIFICANT CHANGE UP (ref 8.5–10.1)
CHLORIDE SERPL-SCNC: 107 MMOL/L — SIGNIFICANT CHANGE UP (ref 96–108)
CHLORIDE SERPL-SCNC: 112 MMOL/L — HIGH (ref 96–108)
CO2 SERPL-SCNC: 22 MMOL/L — SIGNIFICANT CHANGE UP (ref 22–31)
CO2 SERPL-SCNC: 25 MMOL/L — SIGNIFICANT CHANGE UP (ref 22–31)
COLOR SPEC: YELLOW — SIGNIFICANT CHANGE UP
CREAT SERPL-MCNC: 0.6 MG/DL — SIGNIFICANT CHANGE UP (ref 0.5–1.3)
CREAT SERPL-MCNC: 0.65 MG/DL — SIGNIFICANT CHANGE UP (ref 0.5–1.3)
DIFF PNL FLD: NEGATIVE — SIGNIFICANT CHANGE UP
EOSINOPHIL # BLD AUTO: 0.12 K/UL — SIGNIFICANT CHANGE UP (ref 0–0.5)
EOSINOPHIL NFR BLD AUTO: 1.1 % — SIGNIFICANT CHANGE UP (ref 0–6)
EPI CELLS # UR: SIGNIFICANT CHANGE UP
GLUCOSE SERPL-MCNC: 85 MG/DL — SIGNIFICANT CHANGE UP (ref 70–99)
GLUCOSE SERPL-MCNC: 96 MG/DL — SIGNIFICANT CHANGE UP (ref 70–99)
GLUCOSE UR QL: NEGATIVE MG/DL — SIGNIFICANT CHANGE UP
HCT VFR BLD CALC: 35.8 % — SIGNIFICANT CHANGE UP (ref 34.5–45)
HCT VFR BLD CALC: 36.2 % — SIGNIFICANT CHANGE UP (ref 34.5–45)
HGB BLD-MCNC: 11.6 G/DL — SIGNIFICANT CHANGE UP (ref 11.5–15.5)
HGB BLD-MCNC: 12.2 G/DL — SIGNIFICANT CHANGE UP (ref 11.5–15.5)
IMM GRANULOCYTES NFR BLD AUTO: 0.5 % — SIGNIFICANT CHANGE UP (ref 0–1.5)
KETONES UR-MCNC: NEGATIVE — SIGNIFICANT CHANGE UP
LACTATE SERPL-SCNC: 0.7 MMOL/L — SIGNIFICANT CHANGE UP (ref 0.7–2)
LEUKOCYTE ESTERASE UR-ACNC: ABNORMAL
LYMPHOCYTES # BLD AUTO: 1.83 K/UL — SIGNIFICANT CHANGE UP (ref 1–3.3)
LYMPHOCYTES # BLD AUTO: 17.4 % — SIGNIFICANT CHANGE UP (ref 13–44)
MAGNESIUM SERPL-MCNC: 1.8 MG/DL — SIGNIFICANT CHANGE UP (ref 1.6–2.6)
MCHC RBC-ENTMCNC: 31.3 PG — SIGNIFICANT CHANGE UP (ref 27–34)
MCHC RBC-ENTMCNC: 31.6 PG — SIGNIFICANT CHANGE UP (ref 27–34)
MCHC RBC-ENTMCNC: 32.4 GM/DL — SIGNIFICANT CHANGE UP (ref 32–36)
MCHC RBC-ENTMCNC: 33.7 GM/DL — SIGNIFICANT CHANGE UP (ref 32–36)
MCV RBC AUTO: 93.8 FL — SIGNIFICANT CHANGE UP (ref 80–100)
MCV RBC AUTO: 96.5 FL — SIGNIFICANT CHANGE UP (ref 80–100)
MONOCYTES # BLD AUTO: 0.94 K/UL — HIGH (ref 0–0.9)
MONOCYTES NFR BLD AUTO: 8.9 % — SIGNIFICANT CHANGE UP (ref 2–14)
NEUTROPHILS # BLD AUTO: 7.56 K/UL — HIGH (ref 1.8–7.4)
NEUTROPHILS NFR BLD AUTO: 71.7 % — SIGNIFICANT CHANGE UP (ref 43–77)
NITRITE UR-MCNC: NEGATIVE — SIGNIFICANT CHANGE UP
NRBC # BLD: 0 /100 WBCS — SIGNIFICANT CHANGE UP (ref 0–0)
NRBC # BLD: 0 /100 WBCS — SIGNIFICANT CHANGE UP (ref 0–0)
PH UR: 7 — SIGNIFICANT CHANGE UP (ref 5–8)
PHOSPHATE SERPL-MCNC: 1.7 MG/DL — LOW (ref 2.5–4.5)
PLATELET # BLD AUTO: 165 K/UL — SIGNIFICANT CHANGE UP (ref 150–400)
PLATELET # BLD AUTO: 178 K/UL — SIGNIFICANT CHANGE UP (ref 150–400)
POTASSIUM SERPL-MCNC: 3.7 MMOL/L — SIGNIFICANT CHANGE UP (ref 3.5–5.3)
POTASSIUM SERPL-MCNC: 3.8 MMOL/L — SIGNIFICANT CHANGE UP (ref 3.5–5.3)
POTASSIUM SERPL-SCNC: 3.7 MMOL/L — SIGNIFICANT CHANGE UP (ref 3.5–5.3)
POTASSIUM SERPL-SCNC: 3.8 MMOL/L — SIGNIFICANT CHANGE UP (ref 3.5–5.3)
PROT SERPL-MCNC: 6.9 GM/DL — SIGNIFICANT CHANGE UP (ref 6–8.3)
PROT UR-MCNC: NEGATIVE MG/DL — SIGNIFICANT CHANGE UP
RBC # BLD: 3.71 M/UL — LOW (ref 3.8–5.2)
RBC # BLD: 3.86 M/UL — SIGNIFICANT CHANGE UP (ref 3.8–5.2)
RBC # FLD: 13.2 % — SIGNIFICANT CHANGE UP (ref 10.3–14.5)
RBC # FLD: 13.2 % — SIGNIFICANT CHANGE UP (ref 10.3–14.5)
SODIUM SERPL-SCNC: 140 MMOL/L — SIGNIFICANT CHANGE UP (ref 135–145)
SODIUM SERPL-SCNC: 143 MMOL/L — SIGNIFICANT CHANGE UP (ref 135–145)
SP GR SPEC: 1.01 — SIGNIFICANT CHANGE UP (ref 1.01–1.02)
UROBILINOGEN FLD QL: NEGATIVE MG/DL — SIGNIFICANT CHANGE UP
WBC # BLD: 10.54 K/UL — HIGH (ref 3.8–10.5)
WBC # BLD: 8.14 K/UL — SIGNIFICANT CHANGE UP (ref 3.8–10.5)
WBC # FLD AUTO: 10.54 K/UL — HIGH (ref 3.8–10.5)
WBC # FLD AUTO: 8.14 K/UL — SIGNIFICANT CHANGE UP (ref 3.8–10.5)
WBC UR QL: ABNORMAL

## 2018-12-16 PROCEDURE — 72148 MRI LUMBAR SPINE W/O DYE: CPT | Mod: 26

## 2018-12-16 RX ORDER — MORPHINE SULFATE 50 MG/1
2 CAPSULE, EXTENDED RELEASE ORAL EVERY 4 HOURS
Qty: 0 | Refills: 0 | Status: CANCELLED | OUTPATIENT
Start: 2018-12-23 | End: 2018-12-17

## 2018-12-16 RX ORDER — POTASSIUM PHOSPHATE, MONOBASIC POTASSIUM PHOSPHATE, DIBASIC 236; 224 MG/ML; MG/ML
15 INJECTION, SOLUTION INTRAVENOUS ONCE
Qty: 0 | Refills: 0 | Status: COMPLETED | OUTPATIENT
Start: 2018-12-16 | End: 2018-12-16

## 2018-12-16 RX ORDER — OXYCODONE AND ACETAMINOPHEN 5; 325 MG/1; MG/1
1 TABLET ORAL EVERY 6 HOURS
Qty: 0 | Refills: 0 | Status: DISCONTINUED | OUTPATIENT
Start: 2018-12-16 | End: 2018-12-17

## 2018-12-16 RX ORDER — ACETAMINOPHEN 500 MG
650 TABLET ORAL EVERY 6 HOURS
Qty: 0 | Refills: 0 | Status: DISCONTINUED | OUTPATIENT
Start: 2018-12-16 | End: 2018-12-17

## 2018-12-16 RX ADMIN — SODIUM CHLORIDE 3 MILLILITER(S): 9 INJECTION INTRAMUSCULAR; INTRAVENOUS; SUBCUTANEOUS at 05:33

## 2018-12-16 RX ADMIN — Medication 650 MILLIGRAM(S): at 18:32

## 2018-12-16 RX ADMIN — SODIUM CHLORIDE 3 MILLILITER(S): 9 INJECTION INTRAMUSCULAR; INTRAVENOUS; SUBCUTANEOUS at 13:58

## 2018-12-16 RX ADMIN — Medication 1 TABLET(S): at 18:28

## 2018-12-16 RX ADMIN — POTASSIUM PHOSPHATE, MONOBASIC POTASSIUM PHOSPHATE, DIBASIC 63.75 MILLIMOLE(S): 236; 224 INJECTION, SOLUTION INTRAVENOUS at 18:28

## 2018-12-16 RX ADMIN — LOSARTAN POTASSIUM 100 MILLIGRAM(S): 100 TABLET, FILM COATED ORAL at 05:32

## 2018-12-16 RX ADMIN — ENOXAPARIN SODIUM 40 MILLIGRAM(S): 100 INJECTION SUBCUTANEOUS at 03:04

## 2018-12-16 NOTE — PROGRESS NOTE ADULT - PROBLEM SELECTOR PLAN 1
pain management  appreciated ortho and PT consults  social work consult for garrett
pain management  appreciated ortho and PT consults  social work consult for garrett

## 2018-12-16 NOTE — PROGRESS NOTE ADULT - ASSESSMENT
77 yo Female w/ PMHx of HTN, Osteoporosis, Raynaud's w/mechanical fall and left superior pubic rami fx.
79 yo Female w/ PMHx of HTN, Osteoporosis, Raynaud's w/mechanical fall and left superior pubic rami fx.

## 2018-12-16 NOTE — PROGRESS NOTE ADULT - PROBLEM/PLAN-2
[facility]
DISPLAY PLAN FREE TEXT
DISPLAY PLAN FREE TEXT

## 2018-12-16 NOTE — PROGRESS NOTE ADULT - SUBJECTIVE AND OBJECTIVE BOX
Patient is a 78y old  Female who presents with a chief complaint of fall (14 Dec 2018 22:27)      OVERNIGHT EVENTS: none    MEDICATIONS  (STANDING):  amLODIPine   Tablet 5 milliGRAM(s) Oral daily  enoxaparin Injectable 40 milliGRAM(s) SubCutaneous every 24 hours  losartan 100 milliGRAM(s) Oral daily  sodium chloride 0.9% lock flush 3 milliLiter(s) IV Push every 8 hours  sodium chloride 0.9%. 1000 milliLiter(s) (80 mL/Hr) IV Continuous <Continuous>    MEDICATIONS  (PRN):  morphine  - Injectable 2 milliGRAM(s) IV Push every 4 hours PRN Mild Pain (1 - 3)  ondansetron Injectable 4 milliGRAM(s) IV Push every 6 hours PRN Nausea and/or Vomiting        Allergies    No Known Allergies    Intolerances        SUBJECTIVE: in bed in NAD, no acute events overnight     Vital Signs Last 24 Hrs  T(C): 37.3 (16 Dec 2018 06:16), Max: 37.6 (15 Dec 2018 18:06)  T(F): 99.1 (16 Dec 2018 06:16), Max: 99.6 (15 Dec 2018 18:06)  HR: 77 (16 Dec 2018 06:16) (70 - 102)  BP: 112/57 (16 Dec 2018 06:16) (112/57 - 135/60)  BP(mean): --  RR: 18 (16 Dec 2018 06:16) (1 - 18)  SpO2: 92% (16 Dec 2018 06:16) (92% - 97%)    PHYSICAL EXAM:  GENERAL: NAD, well-groomed, well-developed  HEAD:  Atraumatic, Normocephalic  EYES: EOMI, PERRLA, conjunctiva and sclera clear  ENMT: No tonsillar erythema, exudates, or enlargement; Moist mucous membranes, Good dentition, No lesions  NECK: Supple, No JVD, Normal thyroid  CHEST/LUNG: Clear to  auscultation bilaterally; No rales, rhonchi, wheezing, or rubs  bilaterally  HEART: Regular rate and rhythm; No murmurs, rubs, or gallops  ABDOMEN: Soft, Nontender, Nondistended; Bowel sounds present  EXTREMITIES:  2+ Peripheral Pulses, No clubbing, cyanosis, or edema BL LE   pain in hip/pelvis with slr more on left than right     SKIN: No rashes or lesions  NERVOUS SYSTEM:  Alert & Oriented X3, Good concentration; Motor Strength 5/5 B/L upper and lower extremities;   DTRs 2+ intact and symmetric, sensation intact BL    LABS:                                      11.6   8.14  )-----------( 165      ( 16 Dec 2018 06:56 )             35.8     12-16    143  |  112<H>  |  13  ----------------------------<  85  3.7   |  22  |  0.65    Ca    7.9<L>      16 Dec 2018 06:56  Phos  1.7     -  Mg     1.8         TPro  7.8  /  Alb  3.7  /  TBili  0.4  /  DBili  x   /  AST  31  /  ALT  33  /  AlkPhos  114  12-14    Urinalysis Basic - ( 15 Dec 2018 00:14 )    Color: Yellow / Appearance: Clear / S.020 / pH: x  Gluc: x / Ketone: Small  / Bili: Negative / Urobili: Negative mg/dL   Blood: x / Protein: Negative mg/dL / Nitrite: Negative   Leuk Esterase: Trace / RBC: x / WBC 0-2   Sq Epi: x / Non Sq Epi: x / Bacteria: Occasional      Cultures;   CAPILLARY BLOOD GLUCOSE    CAPILLARY BLOOD GLUCOSE          Lipid panel:         RADIOLOGY & ADDITIONAL TESTS:      Imaging Personally Reviewed:  [ x] YES      Consultant(s) Notes Reviewed:  [x ] YES     Care Discussed with [x ] Consultants [X ] Patient [x ] Family  [x ]    [x ]  Other; RN

## 2018-12-16 NOTE — PROGRESS NOTE ADULT - PROBLEM SELECTOR PLAN 2
cont out amlodipine   bp stable   hold arb for now as not needed at this point will continue to monitor bp ,.
cont out amlodipine   bp stable   hold arb for now as not needed at this point will continue to monitor bp ,.

## 2018-12-16 NOTE — PROGRESS NOTE ADULT - PROBLEM SELECTOR PROBLEM 6
Closed wedge compression fracture of third lumbar vertebra, initial encounter
Closed wedge compression fracture of third lumbar vertebra, initial encounter

## 2018-12-16 NOTE — PROGRESS NOTE ADULT - PROBLEM SELECTOR PLAN 6
unclear if old or new per documentation hd a fracture last year  treated conservatively    based off xray ? acute     will get MRI , screening form completed by myself and in chart   advised patient to gave daughter  bring brace ,

## 2018-12-17 ENCOUNTER — TRANSCRIPTION ENCOUNTER (OUTPATIENT)
Age: 78
End: 2018-12-17

## 2018-12-17 VITALS
RESPIRATION RATE: 17 BRPM | OXYGEN SATURATION: 95 % | SYSTOLIC BLOOD PRESSURE: 132 MMHG | TEMPERATURE: 100 F | DIASTOLIC BLOOD PRESSURE: 76 MMHG | HEART RATE: 89 BPM

## 2018-12-17 LAB
ANION GAP SERPL CALC-SCNC: 6 MMOL/L — SIGNIFICANT CHANGE UP (ref 5–17)
BUN SERPL-MCNC: 16 MG/DL — SIGNIFICANT CHANGE UP (ref 7–23)
CALCIUM SERPL-MCNC: 8.1 MG/DL — LOW (ref 8.5–10.1)
CHLORIDE SERPL-SCNC: 110 MMOL/L — HIGH (ref 96–108)
CO2 SERPL-SCNC: 26 MMOL/L — SIGNIFICANT CHANGE UP (ref 22–31)
CREAT SERPL-MCNC: 0.64 MG/DL — SIGNIFICANT CHANGE UP (ref 0.5–1.3)
GLUCOSE SERPL-MCNC: 85 MG/DL — SIGNIFICANT CHANGE UP (ref 70–99)
HCT VFR BLD CALC: 31.8 % — LOW (ref 34.5–45)
HGB BLD-MCNC: 11 G/DL — LOW (ref 11.5–15.5)
LACTATE SERPL-SCNC: 0.7 MMOL/L — SIGNIFICANT CHANGE UP (ref 0.7–2)
MAGNESIUM SERPL-MCNC: 1.9 MG/DL — SIGNIFICANT CHANGE UP (ref 1.6–2.6)
MCHC RBC-ENTMCNC: 32.9 PG — SIGNIFICANT CHANGE UP (ref 27–34)
MCHC RBC-ENTMCNC: 34.6 GM/DL — SIGNIFICANT CHANGE UP (ref 32–36)
MCV RBC AUTO: 95.2 FL — SIGNIFICANT CHANGE UP (ref 80–100)
NRBC # BLD: 0 /100 WBCS — SIGNIFICANT CHANGE UP (ref 0–0)
PHOSPHATE SERPL-MCNC: 2.6 MG/DL — SIGNIFICANT CHANGE UP (ref 2.5–4.5)
PLATELET # BLD AUTO: 162 K/UL — SIGNIFICANT CHANGE UP (ref 150–400)
POTASSIUM SERPL-MCNC: 4 MMOL/L — SIGNIFICANT CHANGE UP (ref 3.5–5.3)
POTASSIUM SERPL-SCNC: 4 MMOL/L — SIGNIFICANT CHANGE UP (ref 3.5–5.3)
RBC # BLD: 3.34 M/UL — LOW (ref 3.8–5.2)
RBC # FLD: 13.2 % — SIGNIFICANT CHANGE UP (ref 10.3–14.5)
SODIUM SERPL-SCNC: 142 MMOL/L — SIGNIFICANT CHANGE UP (ref 135–145)
WBC # BLD: 9.01 K/UL — SIGNIFICANT CHANGE UP (ref 3.8–10.5)
WBC # FLD AUTO: 9.01 K/UL — SIGNIFICANT CHANGE UP (ref 3.8–10.5)

## 2018-12-17 PROCEDURE — 99239 HOSP IP/OBS DSCHRG MGMT >30: CPT

## 2018-12-17 RX ORDER — ACETAMINOPHEN 500 MG
2 TABLET ORAL
Qty: 0 | Refills: 0 | DISCHARGE
Start: 2018-12-17

## 2018-12-17 RX ORDER — CEFPODOXIME PROXETIL 100 MG
200 TABLET ORAL EVERY 12 HOURS
Qty: 0 | Refills: 0 | Status: DISCONTINUED | OUTPATIENT
Start: 2018-12-17 | End: 2018-12-17

## 2018-12-17 RX ORDER — CEFPODOXIME PROXETIL 100 MG
1 TABLET ORAL
Qty: 0 | Refills: 0 | DISCHARGE
Start: 2018-12-17

## 2018-12-17 RX ADMIN — SODIUM CHLORIDE 3 MILLILITER(S): 9 INJECTION INTRAMUSCULAR; INTRAVENOUS; SUBCUTANEOUS at 13:51

## 2018-12-17 RX ADMIN — LOSARTAN POTASSIUM 100 MILLIGRAM(S): 100 TABLET, FILM COATED ORAL at 06:16

## 2018-12-17 RX ADMIN — ENOXAPARIN SODIUM 40 MILLIGRAM(S): 100 INJECTION SUBCUTANEOUS at 01:06

## 2018-12-17 RX ADMIN — SODIUM CHLORIDE 3 MILLILITER(S): 9 INJECTION INTRAMUSCULAR; INTRAVENOUS; SUBCUTANEOUS at 06:16

## 2018-12-17 RX ADMIN — Medication 1 TABLET(S): at 06:16

## 2018-12-17 RX ADMIN — OXYCODONE AND ACETAMINOPHEN 1 TABLET(S): 5; 325 TABLET ORAL at 12:25

## 2018-12-17 RX ADMIN — Medication 1 TABLET(S): at 17:35

## 2018-12-17 RX ADMIN — SODIUM CHLORIDE 3 MILLILITER(S): 9 INJECTION INTRAMUSCULAR; INTRAVENOUS; SUBCUTANEOUS at 00:34

## 2018-12-17 RX ADMIN — Medication 650 MILLIGRAM(S): at 08:24

## 2018-12-17 RX ADMIN — AMLODIPINE BESYLATE 5 MILLIGRAM(S): 2.5 TABLET ORAL at 17:35

## 2018-12-17 RX ADMIN — Medication 200 MILLIGRAM(S): at 17:35

## 2018-12-17 RX ADMIN — OXYCODONE AND ACETAMINOPHEN 1 TABLET(S): 5; 325 TABLET ORAL at 13:51

## 2018-12-17 RX ADMIN — Medication 650 MILLIGRAM(S): at 09:00

## 2018-12-17 NOTE — DISCHARGE NOTE ADULT - PLAN OF CARE
Left superior pubic ramus fracture. Continue with Analgesics as needed.   Physical therapy on discharge. Multilevel lumbar degenerative spondylosis with moderate to Severe spinal stenosis. Continue with Analgesics as needed.   Physical therapy on discharge.   Follow up with Private Spinal physician. Follow up with Private Rheumatologist Sepsis During hospital course, Resolved now Continue with Vantin for 5 days. Replaced and resolved. cont home medications.

## 2018-12-17 NOTE — DISCHARGE NOTE ADULT - SECONDARY DIAGNOSIS.
Closed wedge compression fracture of third lumbar vertebra, initial encounter Raynaud's disease without gangrene Acute cystitis without hematuria Hypophosphatemia Essential hypertension

## 2018-12-17 NOTE — DISCHARGE NOTE ADULT - PATIENT PORTAL LINK FT
You can access the [facility] Patient Portal, offered by [facility], by registering with the following website: [email or web address]

## 2018-12-17 NOTE — DISCHARGE NOTE ADULT - HOSPITAL COURSE
77 yo Female w/ PMHx of HTN, Osteoporosis, Raynaud's who presents with L hip pain after sustaining mechanical fall on wet floor while at the store. Pt states she slipped in the store and landed on her L hip. She was unable to bear weight due to pain. She denies any loc, no head trauma, pt denies any fever, chills, sob, cp, palpitations, n./v/d/c no travels or sick contacts. Pt also had a fall last yr with acute vertebral fx; it was managed conservatively.    ER course: CXR no acute changes, Lumbosacral and Hip Xray: Left superior pubic ramus fracture, L3 vertebrae lost of height.     The patient was admitted to medical bed. Pain was treated with analgesics as needed. Orthopedic surgery evaluated the patient and determined that their is no acute surgical intervention. MRI of lumbosacral spine was consistent with degenerative joint disease and spinal stenosis. The patient has private spinal and rheumatologist that she follows as outpatient; pt will follow up with them on discharge. Rehab evaluated the patient and recommended Rehab.

## 2018-12-17 NOTE — DISCHARGE NOTE ADULT - OTHER SIGNIFICANT FINDINGS
Xray Hip w/ Pelvis 2 or 3 Views, Left (12.14.18 @ 16:44) >  There is a comminuted displaced fracture of the superior left pubic   ramus. The hip remains located. There is no loose body. Joints space is   preserved. No significant productive changes are recognized. The femoral   head is intact without cortical collapse or radiographic evidence of   osteonecrosis.  No soft tissue abnormality is recognized.    IMPRESSION:    Left superior pubic ramus fracture.      Xray Chest 1 View AP/PA. (12.14.18 @ 16:45) >    IMPRESSION:  No consolidation or pleural effusion.  Vertical density overlying the lateral right chest likely related to   overlying soft tissue/skin fold. Calcified granuloma overlies lateral   left upper lung zone.  Heart size within normal limits.    Xray Lumbosacral Spine 4 View (12.14.18 @ 16:45) >  FINDINGS:  No previous examinations are available for review.    Lumbar vertebral alignment is maintained.   Pedicles are intact.  No   fracture or destructive bone lesion is seen. There is diffuse osteopenia.   There is moderate spondylosis.    There is loss of height involving the L3 vertebra with a wedge-shaped   deformity which may represent acute fracture.    The sacroiliac joints appear intact.    IMPRESSION: Technically limited study demonstrates loss of height of the   L3 vertebra which may represent acute fracture. Further evaluation may be   performed with cross-sectional imaging as clinically indicated  Osteopenia and degenerative changes.      MR Lumbar Spine No Cont (12.16.18 @ 14:27) >  FINDINGS:    Study is limited due to technical issues -axial images are not able to   be   viewed. The preliminary report will be issued due to patient care.      Vertebrae: Lumbar levoscoliosis. Moderate chronic appearing loss of   height at   L3 notably along its inferior endplate.    Lumbar vertebral Marrow: Unremarkable   Spinal cord: Conus is unremarkable, terminating at level of T12-L1.  DISCS/SPINAL CANAL/NEURAL FORAMINA:   Limited without axial imaging. Discogenic disease and multilevel facet   arthrosis. L2-3: Broad-based foraminal bulge and endplate spur. Facet   arthrosis. Mild canal and mild to moderate right greater than left   foraminal narrowing. L3-4: Broad-based foraminal bulge and endplate spur.   Bilateral facet arthrosis. Moderate to severe canal and right greater   than left foraminal narrowing L4-5 broad-based foraminal bulge. Mild   endplate spur and facet arthrosis. Mild canal and right greater than left   foraminal narrowing L5 1: Broad-based foraminal bulge and endplate spur.   Facet arthrosis. Normal canal and mild left foraminal narrowing.     Other bones/joints:  Coronal images demonstrate serpiginous signal   likely age indeterminate fracture through the left   iliac bone, such as on series 5, image 15      Reproductive:  Possible 2.7 cm cyst in the left adnexa.    Subperitoneal space:  Presacral edema.    Soft tissues: Unremarkable.   .  IMPRESSION:   Limited exam. Multilevel lumbar degenerative spondylosis with moderate to   severe canal and right greater than left foraminal narrowing L3-4, mild   canal and right greater than left foraminal stenosis L4-5 and mild left   foraminal narrowing L5-S1.    Serpiginous low signal may be related to fracture left innominate bone   correlate with CT

## 2018-12-17 NOTE — DISCHARGE NOTE ADULT - MEDICATION SUMMARY - MEDICATIONS TO TAKE
I will START or STAY ON the medications listed below when I get home from the hospital:    oxycodone-acetaminophen 5 mg-325 mg oral tablet  -- 1 tab(s) by mouth every 6 hours, As needed, Moderate Pain (4 - 6)  -- Indication: For Pain     acetaminophen 325 mg oral tablet  -- 2 tab(s) by mouth every 6 hours, As needed, Temp greater or equal to 38C (100.4F)  -- Indication: For Pain/fever     irbesartan 150 mg oral tablet  -- 1 tab(s) by mouth once a day  -- Indication: For Essential hypertension    amLODIPine 5 mg oral tablet  -- 1 tab(s) by mouth once a day  -- Indication: For Essential hypertension    cefpodoxime 200 mg oral tablet  -- 1 tab(s) by mouth every 12 hours  -- Indication: For UTI     calcium-vitamin D 500 mg-200 intl units oral tablet  -- 1 tab(s) by mouth 2 times a day  -- Indication: For Supplemental

## 2018-12-17 NOTE — DISCHARGE NOTE ADULT - CARE PLAN
Principal Discharge DX:	Pelvic fracture  Goal:	Left superior pubic ramus fracture.  Assessment and plan of treatment:	Continue with Analgesics as needed.   Physical therapy on discharge.  Secondary Diagnosis:	Closed wedge compression fracture of third lumbar vertebra, initial encounter  Goal:	Multilevel lumbar degenerative spondylosis with moderate to Severe spinal stenosis.  Assessment and plan of treatment:	Continue with Analgesics as needed.   Physical therapy on discharge.   Follow up with Private Spinal physician.  Secondary Diagnosis:	Raynaud's disease without gangrene  Goal:	Follow up with Private Rheumatologist  Secondary Diagnosis:	Acute cystitis without hematuria  Goal:	Sepsis During hospital course, Resolved now  Assessment and plan of treatment:	Continue with Vantin for 5 days.  Secondary Diagnosis:	Hypophosphatemia  Goal:	Replaced and resolved.  Secondary Diagnosis:	Essential hypertension  Goal:	cont home medications.

## 2018-12-18 LAB
-  AMIKACIN: SIGNIFICANT CHANGE UP
-  AMOXICILLIN/CLAVULANIC ACID: SIGNIFICANT CHANGE UP
-  AMPICILLIN/SULBACTAM: SIGNIFICANT CHANGE UP
-  AMPICILLIN: SIGNIFICANT CHANGE UP
-  AZTREONAM: SIGNIFICANT CHANGE UP
-  CEFAZOLIN: SIGNIFICANT CHANGE UP
-  CEFEPIME: SIGNIFICANT CHANGE UP
-  CEFOXITIN: SIGNIFICANT CHANGE UP
-  CEFTRIAXONE: SIGNIFICANT CHANGE UP
-  CIPROFLOXACIN: SIGNIFICANT CHANGE UP
-  ERTAPENEM: SIGNIFICANT CHANGE UP
-  GENTAMICIN: SIGNIFICANT CHANGE UP
-  LEVOFLOXACIN: SIGNIFICANT CHANGE UP
-  MEROPENEM: SIGNIFICANT CHANGE UP
-  NITROFURANTOIN: SIGNIFICANT CHANGE UP
-  PIPERACILLIN/TAZOBACTAM: SIGNIFICANT CHANGE UP
-  TOBRAMYCIN: SIGNIFICANT CHANGE UP
-  TRIMETHOPRIM/SULFAMETHOXAZOLE: SIGNIFICANT CHANGE UP
CULTURE RESULTS: SIGNIFICANT CHANGE UP
METHOD TYPE: SIGNIFICANT CHANGE UP
ORGANISM # SPEC MICROSCOPIC CNT: SIGNIFICANT CHANGE UP
ORGANISM # SPEC MICROSCOPIC CNT: SIGNIFICANT CHANGE UP
SPECIMEN SOURCE: SIGNIFICANT CHANGE UP

## 2018-12-19 DIAGNOSIS — M81.0 AGE-RELATED OSTEOPOROSIS WITHOUT CURRENT PATHOLOGICAL FRACTURE: ICD-10-CM

## 2018-12-19 DIAGNOSIS — I73.00 RAYNAUD'S SYNDROME WITHOUT GANGRENE: ICD-10-CM

## 2018-12-19 DIAGNOSIS — N30.00 ACUTE CYSTITIS WITHOUT HEMATURIA: ICD-10-CM

## 2018-12-19 DIAGNOSIS — E83.39 OTHER DISORDERS OF PHOSPHORUS METABOLISM: ICD-10-CM

## 2018-12-19 DIAGNOSIS — S32.502A UNSPECIFIED FRACTURE OF LEFT PUBIS, INITIAL ENCOUNTER FOR CLOSED FRACTURE: ICD-10-CM

## 2018-12-19 DIAGNOSIS — M25.559 PAIN IN UNSPECIFIED HIP: ICD-10-CM
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
## 2018-12-19 DIAGNOSIS — S32.030A WEDGE COMPRESSION FRACTURE OF THIRD LUMBAR VERTEBRA, INITIAL ENCOUNTER FOR CLOSED FRACTURE: ICD-10-CM
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
## 2018-12-19 DIAGNOSIS — I10 ESSENTIAL (PRIMARY) HYPERTENSION: ICD-10-CM

## 2018-12-22 LAB
CULTURE RESULTS: SIGNIFICANT CHANGE UP
CULTURE RESULTS: SIGNIFICANT CHANGE UP
SPECIMEN SOURCE: SIGNIFICANT CHANGE UP
SPECIMEN SOURCE: SIGNIFICANT CHANGE UP

## 2019-10-13 ENCOUNTER — INPATIENT (INPATIENT)
Facility: HOSPITAL | Age: 79
LOS: 0 days | Discharge: HOME | End: 2019-10-14
Attending: INTERNAL MEDICINE | Admitting: INTERNAL MEDICINE
Payer: MEDICARE

## 2019-10-13 VITALS
SYSTOLIC BLOOD PRESSURE: 184 MMHG | RESPIRATION RATE: 17 BRPM | DIASTOLIC BLOOD PRESSURE: 89 MMHG | HEART RATE: 100 BPM | TEMPERATURE: 98 F | HEIGHT: 60 IN | OXYGEN SATURATION: 96 % | WEIGHT: 110.89 LBS

## 2019-10-13 DIAGNOSIS — K40.90 UNILATERAL INGUINAL HERNIA, WITHOUT OBSTRUCTION OR GANGRENE, NOT SPECIFIED AS RECURRENT: Chronic | ICD-10-CM

## 2019-10-13 LAB
ALBUMIN SERPL ELPH-MCNC: 3.6 G/DL — SIGNIFICANT CHANGE UP (ref 3.3–5)
ALP SERPL-CCNC: 151 U/L — HIGH (ref 40–120)
ALT FLD-CCNC: 30 U/L — SIGNIFICANT CHANGE UP (ref 12–78)
ANION GAP SERPL CALC-SCNC: 7 MMOL/L — SIGNIFICANT CHANGE UP (ref 5–17)
AST SERPL-CCNC: 30 U/L — SIGNIFICANT CHANGE UP (ref 15–37)
BASOPHILS # BLD AUTO: 0.04 K/UL — SIGNIFICANT CHANGE UP (ref 0–0.2)
BASOPHILS NFR BLD AUTO: 0.6 % — SIGNIFICANT CHANGE UP (ref 0–2)
BILIRUB SERPL-MCNC: 0.4 MG/DL — SIGNIFICANT CHANGE UP (ref 0.2–1.2)
BUN SERPL-MCNC: 20 MG/DL — SIGNIFICANT CHANGE UP (ref 7–23)
CALCIUM SERPL-MCNC: 8.7 MG/DL — SIGNIFICANT CHANGE UP (ref 8.5–10.1)
CHLORIDE SERPL-SCNC: 108 MMOL/L — SIGNIFICANT CHANGE UP (ref 96–108)
CO2 SERPL-SCNC: 23 MMOL/L — SIGNIFICANT CHANGE UP (ref 22–31)
CREAT SERPL-MCNC: 0.88 MG/DL — SIGNIFICANT CHANGE UP (ref 0.5–1.3)
EOSINOPHIL # BLD AUTO: 0.33 K/UL — SIGNIFICANT CHANGE UP (ref 0–0.5)
EOSINOPHIL NFR BLD AUTO: 5.2 % — SIGNIFICANT CHANGE UP (ref 0–6)
GLUCOSE SERPL-MCNC: 97 MG/DL — SIGNIFICANT CHANGE UP (ref 70–99)
HCT VFR BLD CALC: 38.6 % — SIGNIFICANT CHANGE UP (ref 34.5–45)
HGB BLD-MCNC: 12.5 G/DL — SIGNIFICANT CHANGE UP (ref 11.5–15.5)
IMM GRANULOCYTES NFR BLD AUTO: 0.5 % — SIGNIFICANT CHANGE UP (ref 0–1.5)
LYMPHOCYTES # BLD AUTO: 2.1 K/UL — SIGNIFICANT CHANGE UP (ref 1–3.3)
LYMPHOCYTES # BLD AUTO: 33.3 % — SIGNIFICANT CHANGE UP (ref 13–44)
MCHC RBC-ENTMCNC: 31.2 PG — SIGNIFICANT CHANGE UP (ref 27–34)
MCHC RBC-ENTMCNC: 32.4 GM/DL — SIGNIFICANT CHANGE UP (ref 32–36)
MCV RBC AUTO: 96.3 FL — SIGNIFICANT CHANGE UP (ref 80–100)
MONOCYTES # BLD AUTO: 0.66 K/UL — SIGNIFICANT CHANGE UP (ref 0–0.9)
MONOCYTES NFR BLD AUTO: 10.5 % — SIGNIFICANT CHANGE UP (ref 2–14)
NEUTROPHILS # BLD AUTO: 3.14 K/UL — SIGNIFICANT CHANGE UP (ref 1.8–7.4)
NEUTROPHILS NFR BLD AUTO: 49.9 % — SIGNIFICANT CHANGE UP (ref 43–77)
NRBC # BLD: 0 /100 WBCS — SIGNIFICANT CHANGE UP (ref 0–0)
PLATELET # BLD AUTO: 198 K/UL — SIGNIFICANT CHANGE UP (ref 150–400)
POTASSIUM SERPL-MCNC: 3.8 MMOL/L — SIGNIFICANT CHANGE UP (ref 3.5–5.3)
POTASSIUM SERPL-SCNC: 3.8 MMOL/L — SIGNIFICANT CHANGE UP (ref 3.5–5.3)
PROT SERPL-MCNC: 7.5 GM/DL — SIGNIFICANT CHANGE UP (ref 6–8.3)
RBC # BLD: 4.01 M/UL — SIGNIFICANT CHANGE UP (ref 3.8–5.2)
RBC # FLD: 13.2 % — SIGNIFICANT CHANGE UP (ref 10.3–14.5)
SODIUM SERPL-SCNC: 138 MMOL/L — SIGNIFICANT CHANGE UP (ref 135–145)
WBC # BLD: 6.3 K/UL — SIGNIFICANT CHANGE UP (ref 3.8–10.5)
WBC # FLD AUTO: 6.3 K/UL — SIGNIFICANT CHANGE UP (ref 3.8–10.5)

## 2019-10-13 PROCEDURE — 71045 X-RAY EXAM CHEST 1 VIEW: CPT | Mod: 26

## 2019-10-13 PROCEDURE — 99285 EMERGENCY DEPT VISIT HI MDM: CPT

## 2019-10-13 PROCEDURE — 99291 CRITICAL CARE FIRST HOUR: CPT

## 2019-10-13 PROCEDURE — 93010 ELECTROCARDIOGRAM REPORT: CPT

## 2019-10-13 RX ORDER — ENOXAPARIN SODIUM 100 MG/ML
40 INJECTION SUBCUTANEOUS DAILY
Refills: 0 | Status: DISCONTINUED | OUTPATIENT
Start: 2019-10-13 | End: 2019-10-14

## 2019-10-13 RX ORDER — DIPHENHYDRAMINE HCL 50 MG
50 CAPSULE ORAL ONCE
Refills: 0 | Status: COMPLETED | OUTPATIENT
Start: 2019-10-13 | End: 2019-10-13

## 2019-10-13 RX ORDER — DIPHENHYDRAMINE HCL 50 MG
25 CAPSULE ORAL EVERY 6 HOURS
Refills: 0 | Status: DISCONTINUED | OUTPATIENT
Start: 2019-10-13 | End: 2019-10-14

## 2019-10-13 RX ORDER — CHLORHEXIDINE GLUCONATE 213 G/1000ML
1 SOLUTION TOPICAL
Refills: 0 | Status: DISCONTINUED | OUTPATIENT
Start: 2019-10-13 | End: 2019-10-14

## 2019-10-13 RX ORDER — EPINEPHRINE 0.3 MG/.3ML
0.3 INJECTION INTRAMUSCULAR; SUBCUTANEOUS ONCE
Refills: 0 | Status: COMPLETED | OUTPATIENT
Start: 2019-10-13 | End: 2019-10-13

## 2019-10-13 RX ORDER — SODIUM CHLORIDE 9 MG/ML
1000 INJECTION INTRAMUSCULAR; INTRAVENOUS; SUBCUTANEOUS ONCE
Refills: 0 | Status: COMPLETED | OUTPATIENT
Start: 2019-10-13 | End: 2019-10-13

## 2019-10-13 RX ORDER — DEXAMETHASONE 0.5 MG/5ML
4 ELIXIR ORAL EVERY 6 HOURS
Refills: 0 | Status: DISCONTINUED | OUTPATIENT
Start: 2019-10-13 | End: 2019-10-14

## 2019-10-13 RX ORDER — FAMOTIDINE 10 MG/ML
20 INJECTION INTRAVENOUS ONCE
Refills: 0 | Status: COMPLETED | OUTPATIENT
Start: 2019-10-13 | End: 2019-10-13

## 2019-10-13 RX ADMIN — Medication 50 MILLIGRAM(S): at 04:48

## 2019-10-13 RX ADMIN — CHLORHEXIDINE GLUCONATE 1 APPLICATION(S): 213 SOLUTION TOPICAL at 09:20

## 2019-10-13 RX ADMIN — Medication 25 MILLIGRAM(S): at 12:19

## 2019-10-13 RX ADMIN — Medication 4 MILLIGRAM(S): at 17:21

## 2019-10-13 RX ADMIN — EPINEPHRINE 0.3 MILLIGRAM(S): 0.3 INJECTION INTRAMUSCULAR; SUBCUTANEOUS at 06:12

## 2019-10-13 RX ADMIN — EPINEPHRINE 0.3 MILLIGRAM(S): 0.3 INJECTION INTRAMUSCULAR; SUBCUTANEOUS at 04:48

## 2019-10-13 RX ADMIN — SODIUM CHLORIDE 1000 MILLILITER(S): 9 INJECTION INTRAMUSCULAR; INTRAVENOUS; SUBCUTANEOUS at 04:49

## 2019-10-13 RX ADMIN — ENOXAPARIN SODIUM 40 MILLIGRAM(S): 100 INJECTION SUBCUTANEOUS at 12:19

## 2019-10-13 RX ADMIN — Medication 25 MILLIGRAM(S): at 17:22

## 2019-10-13 RX ADMIN — Medication 4 MILLIGRAM(S): at 23:02

## 2019-10-13 RX ADMIN — Medication 125 MILLIGRAM(S): at 04:48

## 2019-10-13 RX ADMIN — FAMOTIDINE 104 MILLIGRAM(S): 10 INJECTION INTRAVENOUS at 04:47

## 2019-10-13 RX ADMIN — Medication 25 MILLIGRAM(S): at 23:02

## 2019-10-13 RX ADMIN — Medication 4 MILLIGRAM(S): at 12:19

## 2019-10-13 NOTE — H&P ADULT - NSICDXFAMILYHX_GEN_ALL_CORE_FT
FAMILY HISTORY:  Father  Still living? Unknown  Family history of acute myocardial infarction, Age at diagnosis: Age Unknown    Mother  Still living? Unknown  Family history of stroke, Age at diagnosis: Age Unknown

## 2019-10-13 NOTE — ED ADULT NURSE REASSESSMENT NOTE - JUGULAR VENOUS DISTENTION
Medication:   Requested Prescriptions     Pending Prescriptions Disp Refills    methylphenidate (RITALIN) 20 MG tablet 60 tablet 0     Sig: Take 1 tablet by mouth 2 times daily for 31 days. Jean Henaoin Date: 8/6/18      Last Filled:  7/6/18    Patient Phone Number: [phone] (home)     Last appt: Visit date not found   Next appt: Visit date not found    Last OARRS: No flowsheet data found.     Preferred Pharmacy:   [facility] [address] [place] [address] [place], [address] [place] [address] [address] [place] [id number]  Phone: [phone] Fax: [phone]    [facility] [address], [address] [place] [address] Blayne Lazcano 1996 [address] [facility]  [address] [address]  Phone: [phone] Fax: [phone] absent

## 2019-10-13 NOTE — ED PROVIDER NOTE - CLINICAL SUMMARY MEDICAL DECISION MAKING FREE TEXT BOX
Patient with angioedema in setting of ARBs, recent antibiotics.  Wanted to intubate patient but she and family remain very hesistant, patient states improvement with current medications.  D/w ICU attending Dr. Cohen.  Family made to understand that any worsening of her symptoms will suggest need for intubation.  Patient is to be admitted to the hospital and the case was discussed with the admitting physician.  Any changes in plan, additional imaging/labs, and further work up will be at the discretion of the admitting physician. Patient with angioedema in setting of ARBs, recent antibiotics.  Wanted to intubate patient but she and family remain very hesistant, patient states improvement with current medications.  D/w ICU attending Dr. Cohen.  Family made to understand that any worsening of her symptoms will suggest need for intubation.  On reevaluation patient has decreased swelling.  Patient is to be admitted to the hospital and the case was discussed with the admitting physician.  Any changes in plan, additional imaging/labs, and further work up will be at the discretion of the admitting physician.

## 2019-10-13 NOTE — H&P ADULT - ASSESSMENT
Patient is a 79 year old F with hx of HTN (amlodipine and irbersartan over 2 years ), Osteoporosis (prolia #4 injection), admits to critical care for angioedema for airway management    -continue famotadine, benadryl and decadron  -would hold amlodipine and ACE/ARB for now  -npo for now  -dvt  and gi prophylaxis Patient is a 79 year old F with hx of HTN (amlodipine and irbersartan over 2 years ), Osteoporosis (prolia #4 injection), admits to critical care for angioedema unclear etiology for airway management    -continue famotadine, benadryl and decadron  -would hold amlodipine and ACE/ARB for now  -npo for now  -dvt  and gi prophylaxis

## 2019-10-13 NOTE — H&P ADULT - HISTORY OF PRESENT ILLNESS
Patient is a 79 year old F with hx of HTN (amlodipine and irbersartan over 2 years ), Osteoporosis (prolia #4 injection), c/o tongue swelling which started to the right side and slowly moved to left, at around 2 am. As per patient had a dental procedure during the day time, and dentist sent patient home with amoxicillin. Patient took 1 tab of 500mg amoxicillin when tongue started to swell up.  While in ED, received solumedrol, epinephrine and benadryl.

## 2019-10-13 NOTE — ED ADULT NURSE NOTE - PRO INTERPRETER NEED 2
Weekend On- Call Care Coordinator Progress Note    Admission Date/Time:  2018  Attending MD:  Bridget Heart MD    Data  Chart reviewed, discussed with interdisciplinary team.   Patient was admitted for:    Paroxysmal atrial fibrillation (H)  Hematemesis, presence of nausea not specified.    Concerns with insurance coverage for discharge needs: None.  Current Living Situation: Patient lives in a group home- [facility].  Support System: Per chart review and group home, pt has guardian from [facility].  Emergency Contact/Guardian-Tracy Vance: [phone]    [facility]-Elise Cleve: [phone] (for consent if needed)    [facility] (pt's GH):  [phone]  RN: [phone]  On-call: [phone]    Transportation at Discharge: The group home provides transport.  Barriers to Discharge: Medical stability.    Coordination of Care  RNCC contacted the on call nurse at the group home # [phone] and talk to Alina.  RNCC provided update about the discharge plan.  Alina stated they can accept pt tomorrow, we just need to call them once we know the discharge time to arrange transport.  Alina stated the group home and pt niece were working with pt guardian to take him to his niece house for the holiday and wants to know if pt is going to be discharge to his niece house.  RNCC informed Alina, that is the guardian decision, the group home and the niece need to communicate that with pt guardian.  Krish agreed to inform pt niece.      Assessment  On Call RNCC was informed by the team pt will be ready to go back to the group home tomorrow, 18.     Plan  Anticipated Discharge Date:  Tomorrow, 18  Anticipated Discharge Plan:   Back to the group home with previous service.  [facility] [place] will provide transpiration.  RNCC to contact the group home at # [phone] to provide update about the discharge plan and arrange transport.  RNCC will cont to follow plan of  care.      Mera Odne RN, PHN, BSN  4A and 4E/ ICU  Care Coordinator  Phone: [phone]  Pager: [phone]         English

## 2019-10-13 NOTE — H&P ADULT - NSHPPHYSICALEXAM_GEN_ALL_CORE
PHYSICAL EXAM:  GENERAL: NAD, well-developed, well nourished  HEAD:  NC/AC  ENT: no lip swelling, tongue swelling, able to visualize uvula  CHEST/LUNG: CTAB. No cough, wheeze, rales.   HEART: Regular rate and rhythm. No murmurs, rubs, or gallops.   ABDOMEN: Soft, Nontender, Nondistended. Bowel sounds present  EXTREMITIES:  2+ Peripheral Pulses, No clubbing, cyanosis, or edema  MS: AAOx3  NEUROLOGY: non-focal  SKIN: No rashes or lesions

## 2019-10-13 NOTE — H&P ADULT - NSHPLABSRESULTS_GEN_ALL_CORE
.  LABS:                         12.5   6.30  )-----------( 198      ( 13 Oct 2019 04:44 )             38.6     10-13    138  |  108  |  20  ----------------------------<  97  3.8   |  23  |  0.88    Ca    8.7      13 Oct 2019 04:44    TPro  7.5  /  Alb  3.6  /  TBili  0.4  /  DBili  x   /  AST  30  /  ALT  30  /  AlkPhos  151<H>  10-13              RADIOLOGY, EKG & ADDITIONAL TESTS: Reviewed.

## 2019-10-13 NOTE — ED ADULT NURSE NOTE - OBJECTIVE STATEMENT
Pt presents w/ complaints of tongue swelling and difficulty swallowing onset 1 hour ago. Pt speaking in clear full sentences, took amoxicillin S/P tooth extraction.

## 2019-10-13 NOTE — ED PROVIDER NOTE - PHYSICAL EXAMINATION
Gen: Alert, NAD  Head: NC, AT, PERRL, EOMI, normal lids/conjunctiva  ENT: normal hearing, patent oropharynx without erythema/exudate, tongue diffusely enlarged, Mallampati 4  Neck: +supple, no tenderness/meningismus/JVD, +Trachea midline, no stridor  Pulm: Bilateral BS, normal resp effort, no wheeze/stridor/retractions  CV: RRR, no M/R/G, +dist pulses  Abd: soft, NT/ND, Negative Grayling signs, +BS, no palpable masses  Mskel: no edema/erythema/cyanosis  Skin: no rash, warm/dry  Neuro: AAOx3, no apparent sensory/motor deficits, coordination intact

## 2019-10-13 NOTE — ED PROVIDER NOTE - OBJECTIVE STATEMENT
Pertinent PMH/PSH/FHx/SHx and Review of Systems contained within:  Patient presents to the ED for angioedema.  Patient reports waking up with right sided tongue swelling about 2.5 hours ago, now reports entire tongue is swollen and her throat "feels funny"  Patient is speaking full sentences, denies respiratory distress.  Patient started amoxicillin today after having dental extraction.  She was also given her scheduled prolia injection for osteoperosis 2 days ago which is not a new med.  She denies any other new meds.  Patient is on irbesartan for BP.      Relevant PMHx/SHx/SOCHx/FAMH:  HTN, osteoperosis  Patient denies EtOH/tobacco/illicit substance use.    ROS: No fever/chills, No headache/photophobia/eye pain/changes in vision, No ear pain/sore throat, No chest pain/palpitations, no SOB/cough/wheeze/stridor, No abdominal pain, No N/V/D/melena, no dysuria/frequency/discharge, No neck/back pain, no rash, no changes in neurological status/function.

## 2019-10-13 NOTE — H&P ADULT - ATTENDING COMMENTS
79 year old F with hx of HTN and osteoperosis p/w angioedema. Pt w/ acute onset of tongue swelling overnight w/o any other systemic sx of pruritis or urticaria or hemodynamic instability. No prior episodes in past. Current trigger may be her use of ARB vs prolia (injected recently) vs dental manipulation (earlier that day for minor procedure). Unclear which is the cause but will avoid ARB and add to AE reaction list. Received epi, steroids and h blockers with good improvement in swelling. Denies SOb and able to handle secretions w/o difficulty. Cont steroids and h blockade. Will need outpt f/u with allergy before restarting her home medications.

## 2019-10-13 NOTE — H&P ADULT - NSHPREVIEWOFSYSTEMS_GEN_ALL_CORE
REVIEW OF SYSTEMS:    CONSTITUTIONAL: No fever, weight loss, or fatigue  EYES: No eye pain, visual disturbances, or discharge  ENMT: +tongue swelling, no drooling  NECK: No pain or stiffness  BREASTS: No pain, masses, or nipple discharge  RESPIRATORY: No cough, wheezing, chills or hemoptysis; No shortness of breath  CARDIOVASCULAR: No chest pain, palpitations, dizziness, or leg swelling  GASTROINTESTINAL: No abdominal or epigastric pain. No nausea, vomiting, or hematemesis; No diarrhea or constipation. No melena or hematochezia.  GENITOURINARY: No dysuria, frequency, hematuria, or incontinence  NEUROLOGICAL: No headaches, memory loss, loss of strength, numbness, or tremors  SKIN: No itching, burning, rashes, or lesions   LYMPH NODES: No enlarged glands  ENDOCRINE: No heat or cold intolerance; No hair loss  MUSCULOSKELETAL: No joint pain or swelling; No muscle, back, or extremity pain  PSYCHIATRIC: No depression, anxiety, mood swings, or difficulty sleeping  HEME/LYMPH: No easy bruising, or bleeding gums  ALLERGY AND IMMUNOLOGIC: No hives or eczema

## 2019-10-13 NOTE — ED ADULT NURSE NOTE - NSIMPLEMENTINTERV_GEN_ALL_ED
Implemented All Universal Safety Interventions:  Constable to call system. Call bell, personal items and telephone within reach. Instruct patient to call for assistance. Room bathroom lighting operational. Non-slip footwear when patient is off stretcher. Physically safe environment: no spills, clutter or unnecessary equipment. Stretcher in lowest position, wheels locked, appropriate side rails in place.

## 2019-10-13 NOTE — ED ADULT TRIAGE NOTE - CHIEF COMPLAINT QUOTE
Patient reports " Tongue swollen." Woke from sleep 1 hour ago with swelling. Airway patent but narrowed. Patient had tooth extracted. Patient took Prolia 2 days ago. Patient took 500mg of amoxicillin.

## 2019-10-14 ENCOUNTER — TRANSCRIPTION ENCOUNTER (OUTPATIENT)
Age: 79
End: 2019-10-14

## 2019-10-14 VITALS
HEART RATE: 92 BPM | RESPIRATION RATE: 22 BRPM | TEMPERATURE: 98 F | SYSTOLIC BLOOD PRESSURE: 147 MMHG | DIASTOLIC BLOOD PRESSURE: 68 MMHG | OXYGEN SATURATION: 99 %

## 2019-10-14 LAB
ANION GAP SERPL CALC-SCNC: 8 MMOL/L — SIGNIFICANT CHANGE UP (ref 5–17)
BUN SERPL-MCNC: 14 MG/DL — SIGNIFICANT CHANGE UP (ref 7–23)
CALCIUM SERPL-MCNC: 8.2 MG/DL — LOW (ref 8.5–10.1)
CHLORIDE SERPL-SCNC: 112 MMOL/L — HIGH (ref 96–108)
CO2 SERPL-SCNC: 22 MMOL/L — SIGNIFICANT CHANGE UP (ref 22–31)
CREAT SERPL-MCNC: 0.67 MG/DL — SIGNIFICANT CHANGE UP (ref 0.5–1.3)
GLUCOSE SERPL-MCNC: 119 MG/DL — HIGH (ref 70–99)
HCT VFR BLD CALC: 36.2 % — SIGNIFICANT CHANGE UP (ref 34.5–45)
HGB BLD-MCNC: 11.9 G/DL — SIGNIFICANT CHANGE UP (ref 11.5–15.5)
MAGNESIUM SERPL-MCNC: 2.2 MG/DL — SIGNIFICANT CHANGE UP (ref 1.6–2.6)
MCHC RBC-ENTMCNC: 31.6 PG — SIGNIFICANT CHANGE UP (ref 27–34)
MCHC RBC-ENTMCNC: 32.9 GM/DL — SIGNIFICANT CHANGE UP (ref 32–36)
MCV RBC AUTO: 96 FL — SIGNIFICANT CHANGE UP (ref 80–100)
NRBC # BLD: 0 /100 WBCS — SIGNIFICANT CHANGE UP (ref 0–0)
PHOSPHATE SERPL-MCNC: 1.8 MG/DL — LOW (ref 2.5–4.5)
PLATELET # BLD AUTO: 197 K/UL — SIGNIFICANT CHANGE UP (ref 150–400)
POTASSIUM SERPL-MCNC: 3.5 MMOL/L — SIGNIFICANT CHANGE UP (ref 3.5–5.3)
POTASSIUM SERPL-SCNC: 3.5 MMOL/L — SIGNIFICANT CHANGE UP (ref 3.5–5.3)
RBC # BLD: 3.77 M/UL — LOW (ref 3.8–5.2)
RBC # FLD: 13.4 % — SIGNIFICANT CHANGE UP (ref 10.3–14.5)
SODIUM SERPL-SCNC: 142 MMOL/L — SIGNIFICANT CHANGE UP (ref 135–145)
WBC # BLD: 9.61 K/UL — SIGNIFICANT CHANGE UP (ref 3.8–10.5)
WBC # FLD AUTO: 9.61 K/UL — SIGNIFICANT CHANGE UP (ref 3.8–10.5)

## 2019-10-14 PROCEDURE — 99239 HOSP IP/OBS DSCHRG MGMT >30: CPT

## 2019-10-14 RX ORDER — FAMOTIDINE 10 MG/ML
1 INJECTION INTRAVENOUS
Qty: 4 | Refills: 0
Start: 2019-10-14 | End: 2019-10-15

## 2019-10-14 RX ORDER — IRBESARTAN 75 MG/1
1 TABLET ORAL
Qty: 0 | Refills: 0 | DISCHARGE

## 2019-10-14 RX ORDER — POTASSIUM PHOSPHATE, MONOBASIC POTASSIUM PHOSPHATE, DIBASIC 236; 224 MG/ML; MG/ML
15 INJECTION, SOLUTION INTRAVENOUS ONCE
Refills: 0 | Status: COMPLETED | OUTPATIENT
Start: 2019-10-14 | End: 2019-10-14

## 2019-10-14 RX ORDER — SODIUM,POTASSIUM PHOSPHATES 278-250MG
1 POWDER IN PACKET (EA) ORAL
Refills: 0 | Status: COMPLETED | OUTPATIENT
Start: 2019-10-14 | End: 2019-10-14

## 2019-10-14 RX ORDER — DIPHENHYDRAMINE HCL 50 MG
1 CAPSULE ORAL
Qty: 8 | Refills: 0
Start: 2019-10-14 | End: 2019-10-15

## 2019-10-14 RX ORDER — AMLODIPINE BESYLATE 2.5 MG/1
1 TABLET ORAL
Qty: 0 | Refills: 0 | DISCHARGE

## 2019-10-14 RX ADMIN — Medication 25 MILLIGRAM(S): at 06:29

## 2019-10-14 RX ADMIN — Medication 1 PACKET(S): at 12:17

## 2019-10-14 RX ADMIN — POTASSIUM PHOSPHATE, MONOBASIC POTASSIUM PHOSPHATE, DIBASIC 62.5 MILLIMOLE(S): 236; 224 INJECTION, SOLUTION INTRAVENOUS at 07:47

## 2019-10-14 RX ADMIN — Medication 1 PACKET(S): at 09:43

## 2019-10-14 RX ADMIN — Medication 4 MILLIGRAM(S): at 06:29

## 2019-10-14 NOTE — PROGRESS NOTE ADULT - ASSESSMENT
Pt is a 78 yo F with h/o HTN (on Amlodipine and Irbersartan) and osteoporosis (Prolia #4 injection) pt presented 2 to tongue swelling which started to the R side and slowly moved to L.  As per pt had a dental procedure during the day time (10/12), and dentist sent pt home on Amoxicillin. Pt took 1 tab of 500mg amoxicillin when tongue started to swell up. Admitting dx: Angioedema oissibly 2 to ARB. May dc to home with last dose of Steroid today and H1+2 blocker x1 day. Pt is to f/u with her PMD

## 2019-10-14 NOTE — PHYSICAL THERAPY INITIAL EVALUATION ADULT - PERTINENT HX OF CURRENT PROBLEM, REHAB EVAL
Pt is a 80 yo F with h/o HTN (on Amlodipine and Irbersartan) and osteoporosis (Prolia #4 injection) pt presented 2 to tongue swelling which started to the R side and slowly moved to L.  As per pt had a dental procedure during the day time (10/12), and dentist sent pt home on Amoxicillin. Pt took 1 tab of 500mg amoxicillin when tongue started to swell up. Admitting dx: Angioedema oissibly 2 to ARB. May dc to home with last dose of Steroid today and H1+2 blocker x1 day. Pt is to f/u with her PMD.

## 2019-10-14 NOTE — DISCHARGE NOTE PROVIDER - HOSPITAL COURSE
Pt is a 80 yo F with h/o HTN (on Amlodipine and Irbersartan) and osteoporosis (Prolia #4 injection) pt presented 2 to tongue swelling which started to the R side and slowly moved to L.  As per pt had a dental procedure during the day time (10/12), and dentist sent pt home on Amoxicillin. Pt took 1 tab of 500mg amoxicillin after tongue started to swell up. Patient was brought to ICU for close monitoring of airway and tx with IV steroids, benadryl, and h2 blocker. Airway remained patent during stay with no stridor or edema noted. Patient has remained hemodynamically stable with no respiratory issues or SOB noted throughout her stay in ICU. Recommendations to include additional dose of oral steroid x1, benadryl, and h2 blocker with follow up with PCP tomorrow at 2 pm, and to stop taking amlodipine, irbersartan, and prolia and reassess antihypertensive home medication regimen. Pt is a 78 yo F with h/o HTN (on Amlodipine and Irbersartan) and osteoporosis (Prolia #4 injection) pt presented 2 to tongue swelling which started to the R side and slowly moved to L.  As per pt had a dental procedure during the day time (10/12), and dentist sent pt home on Amoxicillin. Pt took 1 tab of 500mg amoxicillin after tongue started to swell up. Patient was brought to ICU for close monitoring of airway and tx with IV steroids, benadryl, and h2 blocker. Airway remained patent during stay with no stridor or edema noted. Patient has remained hemodynamically stable with no respiratory issues or SOB noted throughout her stay in ICU. Recommendations to include additional dose of oral steroid x1, benadryl, and h2 blocker with follow up with PCP Dr. Ghotra tomorrow at 2 pm (appointment made), and to stop taking amlodipine, irbersartan, and prolia and reassess antihypertensive home medication regimen. Please return to hospital if signs/symptoms reoccur immediately.

## 2019-10-14 NOTE — PHYSICAL THERAPY INITIAL EVALUATION ADULT - GAIT TRAINING, PT EVAL
pt will be able to return to ambulating distances >1200 feet for return to community ambulation x6 weeks

## 2019-10-14 NOTE — DISCHARGE NOTE PROVIDER - NSDCCPCAREPLAN_GEN_ALL_CORE_FT
PRINCIPAL DISCHARGE DIAGNOSIS  Diagnosis: Angioedema, initial encounter  Assessment and Plan of Treatment:

## 2019-10-14 NOTE — PHYSICAL THERAPY INITIAL EVALUATION ADULT - ADDITIONAL COMMENTS
Pt is R hand dominant, uses glasses for reading, drives, has a cane and a rolling walker, community distance ambulator. The cane is used prn when out in the community. Pt lives in a private home with 1 step to enter, 1 flight with 1 handrail to 2nd floor where full bathroom and bedrooms are located. Pt with no recent falls since December 2018, did sustain a fracture to R pelvic are in April this year, insidious onset. Pt has had home PT and outpatient PT services as well.

## 2019-10-14 NOTE — DISCHARGE NOTE NURSING/CASE MANAGEMENT/SOCIAL WORK - PATIENT PORTAL LINK FT
You can access the FollowMyHealth Patient Portal offered by [facility] by registering at the following website: [email or web address]. By joining [facility]’s FollowMyHealth portal, you will also be able to view your health information using other applications (apps) compatible with our system.

## 2019-10-14 NOTE — PROGRESS NOTE ADULT - SUBJECTIVE AND OBJECTIVE BOX
HPI:  Pt is a 78 yo F with h/o HTN (on Amlodipine and Irbersartan) and osteoporosis (Prolia #4 injection) pt presented 2 to tongue swelling which started to the R side and slowly moved to L.  As per pt had a dental procedure during the day time (10/12), and dentist sent pt home on Amoxicillin. Pt took 1 tab of 500mg amoxicillin when tongue started to swell up.      ## Labs:  CBC:                        11.9   9.61  )-----------( 197      ( 14 Oct 2019 05:51 )             36.2     Chem:  10-14    142  |  112<H>  |  14  ----------------------------<  119<H>  3.5   |  22  |  0.67    Ca    8.2<L>      14 Oct 2019 05:51  Phos  1.8     10-14  Mg     2.2     10-14    TPro  7.5  /  Alb  3.6  /  TBili  0.4  /  DBili  x   /  AST  30  /  ALT  30  /  AlkPhos  151<H>  10-13    Coags:          ## Imaging:    ## Medications:      diphenhydrAMINE   Injectable 25 milliGRAM(s) IV Push every 6 hours    dexamethasone  Injectable 4 milliGRAM(s) IV Push every 6 hours    enoxaparin Injectable 40 milliGRAM(s) SubCutaneous daily          ## Vitals:  T(C): 36.3 (10-14-19 @ 08:00), Max: 37 (10-14-19 @ 04:06)  HR: 95 (10-14-19 @ 09:00) (76 - 118)  BP: 103/62 (10-14-19 @ 09:00) (103/62 - 154/130)  BP(mean): 76 (10-14-19 @ 09:00) (76 - 138)  RR: 24 (10-14-19 @ 09:00) (13 - 24)  SpO2: 96% (10-14-19 @ 09:00) (93% - 100%)  Wt(kg): --  Vent:   ABG:       10-13 @ 07:01  -  10-14 @ 07:00  --------------------------------------------------------  IN: 500 mL / OUT: 800 mL / NET: -300 mL          ## P/E:  Gen: lying comfortably in bed in no apparent distress  Mouth: Tongue not swollen  Neck: No stridor   Lungs: CTA  Heart: Tachy  Abd: Soft/+BS  Ext: No edema  Neuro: Awake/alert    CENTRAL LINE: [ ] YES [ ] NO  LOCATION:   DATE INSERTED:  REMOVE: [ ] YES [ ] NO      CASTELAN: [ ] YES [ ] NO    DATE INSERTED:  REMOVE:  [ ] YES [ ] NO      A-LINE:  [ ] YES [ ] NO  LOCATION:   DATE INSERTED:  REMOVE:  [ ] YES [ ] NO  EXPLAIN:    CODE STATUS: [x] full code  [ ] DNR  [ ] DNI  [ ] MOLST  Goals of care discussion: [ ] yes

## 2019-10-14 NOTE — PHYSICAL THERAPY INITIAL EVALUATION ADULT - BALANCE TRAINING, PT EVAL
pt will increase standing dynamic balance by full grade for safety with ambulation, ADLs and transfers x6 weeks

## 2019-10-14 NOTE — DISCHARGE NOTE NURSING/CASE MANAGEMENT/SOCIAL WORK - NSDCPNDISPN_GEN_ALL_CORE
Opioids not applicable/not prescribed/Activities of daily living, including home environment that might     exacerbate pain or reduce effectiveness of the pain management plan of care as well as strategies to address these issues/Education provided on the pain management plan of care/Safe use, storage and disposal of opioids when prescribed

## 2019-10-25 DIAGNOSIS — I83.90 ASYMPTOMATIC VARICOSE VEINS OF UNSPECIFIED LOWER EXTREMITY: ICD-10-CM

## 2019-10-25 DIAGNOSIS — T78.3XXA ANGIONEUROTIC EDEMA, INITIAL ENCOUNTER: ICD-10-CM

## 2019-10-25 DIAGNOSIS — T36.0X5A ADVERSE EFFECT OF PENICILLINS, INITIAL ENCOUNTER: ICD-10-CM

## 2019-10-25 DIAGNOSIS — I73.00 RAYNAUD'S SYNDROME WITHOUT GANGRENE: ICD-10-CM

## 2019-10-25 DIAGNOSIS — I44.7 LEFT BUNDLE-BRANCH BLOCK, UNSPECIFIED: ICD-10-CM

## 2019-10-25 DIAGNOSIS — M81.0 AGE-RELATED OSTEOPOROSIS WITHOUT CURRENT PATHOLOGICAL FRACTURE: ICD-10-CM

## 2019-11-01 ENCOUNTER — OUTPATIENT (OUTPATIENT)
Dept: OUTPATIENT SERVICES | Facility: HOSPITAL | Age: 79
LOS: 1 days | End: 2019-11-01
Payer: MEDICARE

## 2019-11-01 DIAGNOSIS — K40.90 UNILATERAL INGUINAL HERNIA, WITHOUT OBSTRUCTION OR GANGRENE, NOT SPECIFIED AS RECURRENT: Chronic | ICD-10-CM

## 2019-11-01 PROCEDURE — G9001: CPT

## 2019-11-08 DIAGNOSIS — Z71.89 OTHER SPECIFIED COUNSELING: ICD-10-CM

## 2020-10-13 NOTE — PATIENT PROFILE ADULT - NSTRANSFERDENTURES_GEN_A_NUR
Physical Therapy Daily Progress Note    Patient: Lynda Rueda   : 1959  Diagnosis/ICD-10 Code:  Acute pain of right shoulder [M25.511]  Referring practitioner: CHANTALE Potts  Date of Initial Visit: Type: THERAPY  Noted: 2020  Today's Date: 10/12/2020  Patient seen for 8 sessions           Subjective motion seems better. Still painful with movement    Objective   See Exercise, Manual, and Modality Logs for complete treatment.       Assessment/Plan  Improved (R) shoulder PROM/ AAROM in flexion 170/ ab 170;  Apprehension with AROM past 90 degrees. Difficulty with IR. (+) webb/ speeds         Timed:    Manual Therapy:       mins  17014;  Therapeutic Exercise:    30     mins  82536;     Neuromuscular Erica:       mins  91161;    Therapeutic Activity:     10     mins  80785;     Gait Training:           mins  46244;     Ultrasound:         mins  83205;    Electrical Stimulation:         mins  56363 ( );  Iontophoresis         mins 23141;  Aquatic Therapy         mins 19356;  Dry Needling                 mins    Untimed:  Electrical Stimulation:       mins  59354 ( );  Mechanical Traction:         mins  92245;     Timed Treatment:      mins   Total Treatment:     40   mins  Joe Carey, PT  Physical Therapist   full

## 2021-04-26 ENCOUNTER — APPOINTMENT (OUTPATIENT)
Dept: VASCULAR SURGERY | Facility: CLINIC | Age: 81
End: 2021-04-26
Payer: MEDICARE

## 2021-04-26 VITALS
BODY MASS INDEX: 22.78 KG/M2 | SYSTOLIC BLOOD PRESSURE: 177 MMHG | WEIGHT: 116 LBS | HEIGHT: 60 IN | TEMPERATURE: 97.1 F | RESPIRATION RATE: 14 BRPM | DIASTOLIC BLOOD PRESSURE: 89 MMHG | HEART RATE: 76 BPM

## 2021-04-26 DIAGNOSIS — Z87.81 PERSONAL HISTORY OF (HEALED) TRAUMATIC FRACTURE: ICD-10-CM

## 2021-04-26 DIAGNOSIS — S22.039A: ICD-10-CM

## 2021-04-26 PROCEDURE — 93923 UPR/LXTR ART STDY 3+ LVLS: CPT

## 2021-04-26 PROCEDURE — 99213 OFFICE O/P EST LOW 20 MIN: CPT

## 2021-04-26 PROCEDURE — 93970 EXTREMITY STUDY: CPT

## 2021-04-26 NOTE — HISTORY OF PRESENT ILLNESS
[FreeTextEntry1] : I have just had the pleasure of seeing Mrs. Katie Harrell in follow up for lower extremity varicose veins. Mrs. Harrell is a delightful 80-year-old lady who presents with a longstanding history of lower extremity venous insufficiency. On 4/3/2018, she underwent right GSV RFA and stab phlebectomy. Follow up ultrasonography demonstrated a closed GSV, without evidence of DVT. \par \par Since her last visit, Mrs. Harrell reports that she fractured her pelvis (late 2018). This was managed non-operatively. She worked with physical therapy and has largely recovered from her injury. She now ambulates with a cane. She complains of constant pain in her legs and feet (at rest and when ambulating). She is not sure if one side is more affected than the other, as she also has pains in her back and joints. She denies any lower extremity wounds. She takes Advil as needed for pain.\par \par Medications: Pravastatin, Norvasc, Losartan\par \par Allergies: NKDA\par \par Social history: Never smoker\par \par FH: NC\par

## 2021-04-26 NOTE — ASSESSMENT
[FreeTextEntry1] : A lower extremity venous duplex today showed:\par -Right: no evidence of DVT/SVT/reflux\par -Left: reflux in popliteal vein\par \par KEREN/PVR were:\par -Right 0.96/0.86 (blunted at metatarsal level)\par -Left: 0.74/0.72 (blunted at metatarsal level)\par \par In summary, Mrs. Harrell now presents with findings of bilateral lower extremity arterial insufficiency (left greater than right). I instructed her to take Aspirin 81mg daily and continue statin therapy. We discussed scheduling her for a left leg angiogram, which she would like to consider further.

## 2021-04-26 NOTE — PHYSICAL EXAM
[de-identified] : On physical examination the patient is in no acute distress and neurologically intact. The lungs are clear to auscultation and the heart has a regular rate and rhythm. Abdomen is benign. Bilateral femoral pulses are palpable. Pedal pulses are non-palpable. No lower extremity wounds present. Spider veins of both lower extremities.

## 2021-10-28 ENCOUNTER — EMERGENCY (EMERGENCY)
Facility: HOSPITAL | Age: 81
LOS: 0 days | Discharge: ANOTHER HOSPITAL | End: 2021-10-28
Attending: EMERGENCY MEDICINE
Payer: MEDICARE

## 2021-10-28 ENCOUNTER — RESULT REVIEW (OUTPATIENT)
Age: 81
End: 2021-10-28

## 2021-10-28 ENCOUNTER — TRANSCRIPTION ENCOUNTER (OUTPATIENT)
Age: 81
End: 2021-10-28

## 2021-10-28 ENCOUNTER — INPATIENT (INPATIENT)
Facility: HOSPITAL | Age: 81
LOS: 13 days | Discharge: HOME HEALTH | End: 2021-11-11
Attending: SURGERY | Admitting: SURGERY
Payer: MEDICARE

## 2021-10-28 VITALS
RESPIRATION RATE: 19 BRPM | DIASTOLIC BLOOD PRESSURE: 81 MMHG | OXYGEN SATURATION: 100 % | HEART RATE: 106 BPM | HEIGHT: 62 IN | SYSTOLIC BLOOD PRESSURE: 118 MMHG | TEMPERATURE: 99 F

## 2021-10-28 VITALS
RESPIRATION RATE: 16 BRPM | SYSTOLIC BLOOD PRESSURE: 110 MMHG | HEART RATE: 91 BPM | DIASTOLIC BLOOD PRESSURE: 49 MMHG | OXYGEN SATURATION: 100 % | HEIGHT: 62 IN | WEIGHT: 95.02 LBS | TEMPERATURE: 98 F

## 2021-10-28 VITALS
SYSTOLIC BLOOD PRESSURE: 106 MMHG | OXYGEN SATURATION: 97 % | DIASTOLIC BLOOD PRESSURE: 61 MMHG | HEART RATE: 95 BPM | RESPIRATION RATE: 15 BRPM | TEMPERATURE: 98 F

## 2021-10-28 DIAGNOSIS — D62 ACUTE POSTHEMORRHAGIC ANEMIA: ICD-10-CM

## 2021-10-28 DIAGNOSIS — Z20.822 CONTACT WITH AND (SUSPECTED) EXPOSURE TO COVID-19: ICD-10-CM

## 2021-10-28 DIAGNOSIS — Z90.49 ACQUIRED ABSENCE OF OTHER SPECIFIED PARTS OF DIGESTIVE TRACT: ICD-10-CM

## 2021-10-28 DIAGNOSIS — R55 SYNCOPE AND COLLAPSE: ICD-10-CM

## 2021-10-28 DIAGNOSIS — Z90.49 ACQUIRED ABSENCE OF OTHER SPECIFIED PARTS OF DIGESTIVE TRACT: Chronic | ICD-10-CM

## 2021-10-28 DIAGNOSIS — K56.609 UNSPECIFIED INTESTINAL OBSTRUCTION, UNSPECIFIED AS TO PARTIAL VERSUS COMPLETE OBSTRUCTION: ICD-10-CM

## 2021-10-28 DIAGNOSIS — M81.0 AGE-RELATED OSTEOPOROSIS WITHOUT CURRENT PATHOLOGICAL FRACTURE: ICD-10-CM

## 2021-10-28 DIAGNOSIS — M32.9 SYSTEMIC LUPUS ERYTHEMATOSUS, UNSPECIFIED: ICD-10-CM

## 2021-10-28 DIAGNOSIS — Z88.0 ALLERGY STATUS TO PENICILLIN: ICD-10-CM

## 2021-10-28 DIAGNOSIS — K92.2 GASTROINTESTINAL HEMORRHAGE, UNSPECIFIED: ICD-10-CM

## 2021-10-28 DIAGNOSIS — R20.9 UNSPECIFIED DISTURBANCES OF SKIN SENSATION: ICD-10-CM

## 2021-10-28 DIAGNOSIS — I73.00 RAYNAUD'S SYNDROME WITHOUT GANGRENE: ICD-10-CM

## 2021-10-28 DIAGNOSIS — Z85.048 PERSONAL HISTORY OF OTHER MALIGNANT NEOPLASM OF RECTUM, RECTOSIGMOID JUNCTION, AND ANUS: ICD-10-CM

## 2021-10-28 DIAGNOSIS — Z88.8 ALLERGY STATUS TO OTHER DRUGS, MEDICAMENTS AND BIOLOGICAL SUBSTANCES STATUS: ICD-10-CM

## 2021-10-28 DIAGNOSIS — I83.90 ASYMPTOMATIC VARICOSE VEINS OF UNSPECIFIED LOWER EXTREMITY: ICD-10-CM

## 2021-10-28 DIAGNOSIS — K40.90 UNILATERAL INGUINAL HERNIA, WITHOUT OBSTRUCTION OR GANGRENE, NOT SPECIFIED AS RECURRENT: Chronic | ICD-10-CM

## 2021-10-28 DIAGNOSIS — I10 ESSENTIAL (PRIMARY) HYPERTENSION: ICD-10-CM

## 2021-10-28 LAB
ALBUMIN SERPL ELPH-MCNC: 0.8 G/DL — LOW (ref 3.3–5)
ALBUMIN SERPL ELPH-MCNC: 2.6 G/DL — LOW (ref 3.3–5)
ALP SERPL-CCNC: 68 U/L — SIGNIFICANT CHANGE UP (ref 40–120)
ALP SERPL-CCNC: 82 U/L — SIGNIFICANT CHANGE UP (ref 40–120)
ALT FLD-CCNC: 17 U/L — SIGNIFICANT CHANGE UP (ref 4–33)
ALT FLD-CCNC: 9 U/L — LOW (ref 12–78)
ANION GAP SERPL CALC-SCNC: 14 MMOL/L — SIGNIFICANT CHANGE UP (ref 7–14)
ANION GAP SERPL CALC-SCNC: 5 MMOL/L — SIGNIFICANT CHANGE UP (ref 5–17)
ANISOCYTOSIS BLD QL: SIGNIFICANT CHANGE UP
ANISOCYTOSIS BLD QL: SLIGHT — SIGNIFICANT CHANGE UP
APTT BLD: 23.7 SEC — LOW (ref 27–36.3)
APTT BLD: 38.4 SEC — HIGH (ref 27.5–35.5)
AST SERPL-CCNC: 17 U/L — SIGNIFICANT CHANGE UP (ref 15–37)
AST SERPL-CCNC: 19 U/L — SIGNIFICANT CHANGE UP (ref 4–32)
BASE EXCESS BLDV CALC-SCNC: -7.6 MMOL/L — LOW (ref -2–3)
BASOPHILS # BLD AUTO: 0 K/UL — SIGNIFICANT CHANGE UP (ref 0–0.2)
BASOPHILS # BLD AUTO: 0 K/UL — SIGNIFICANT CHANGE UP (ref 0–0.2)
BASOPHILS NFR BLD AUTO: 0 % — SIGNIFICANT CHANGE UP (ref 0–2)
BASOPHILS NFR BLD AUTO: 0 % — SIGNIFICANT CHANGE UP (ref 0–2)
BILIRUB SERPL-MCNC: 0.2 MG/DL — SIGNIFICANT CHANGE UP (ref 0.2–1.2)
BILIRUB SERPL-MCNC: 0.9 MG/DL — SIGNIFICANT CHANGE UP (ref 0.2–1.2)
BLD GP AB SCN SERPL QL: NEGATIVE — SIGNIFICANT CHANGE UP
BLD GP AB SCN SERPL QL: SIGNIFICANT CHANGE UP
BLOOD GAS ARTERIAL - LYTES,HGB,ICA,LACT RESULT: SIGNIFICANT CHANGE UP
BLOOD GAS ARTERIAL - LYTES,HGB,ICA,LACT RESULT: SIGNIFICANT CHANGE UP
BLOOD GAS VENOUS COMPREHENSIVE RESULT: SIGNIFICANT CHANGE UP
BUN SERPL-MCNC: 17 MG/DL — SIGNIFICANT CHANGE UP (ref 7–23)
BUN SERPL-MCNC: 24 MG/DL — HIGH (ref 7–23)
CALCIUM SERPL-MCNC: 5.3 MG/DL — CRITICAL LOW (ref 8.5–10.1)
CALCIUM SERPL-MCNC: 7.8 MG/DL — LOW (ref 8.4–10.5)
CHLORIDE BLDV-SCNC: 107 MMOL/L — SIGNIFICANT CHANGE UP (ref 96–108)
CHLORIDE SERPL-SCNC: 105 MMOL/L — SIGNIFICANT CHANGE UP (ref 98–107)
CHLORIDE SERPL-SCNC: 121 MMOL/L — HIGH (ref 96–108)
CO2 BLDV-SCNC: 19.3 MMOL/L — LOW (ref 22–26)
CO2 SERPL-SCNC: 16 MMOL/L — LOW (ref 22–31)
CO2 SERPL-SCNC: 17 MMOL/L — LOW (ref 22–31)
CREAT SERPL-MCNC: 0.3 MG/DL — LOW (ref 0.5–1.3)
CREAT SERPL-MCNC: 0.68 MG/DL — SIGNIFICANT CHANGE UP (ref 0.5–1.3)
EOSINOPHIL # BLD AUTO: 0 K/UL — SIGNIFICANT CHANGE UP (ref 0–0.5)
EOSINOPHIL # BLD AUTO: 0 K/UL — SIGNIFICANT CHANGE UP (ref 0–0.5)
EOSINOPHIL NFR BLD AUTO: 0 % — SIGNIFICANT CHANGE UP (ref 0–6)
EOSINOPHIL NFR BLD AUTO: 0 % — SIGNIFICANT CHANGE UP (ref 0–6)
FLUAV AG NPH QL: SIGNIFICANT CHANGE UP
FLUBV AG NPH QL: SIGNIFICANT CHANGE UP
GAS PNL BLDV: 133 MMOL/L — LOW (ref 136–145)
GAS PNL BLDV: SIGNIFICANT CHANGE UP
GIANT PLATELETS BLD QL SMEAR: PRESENT — SIGNIFICANT CHANGE UP
GLUCOSE BLDV-MCNC: 122 MG/DL — HIGH (ref 70–99)
GLUCOSE SERPL-MCNC: 131 MG/DL — HIGH (ref 70–99)
GLUCOSE SERPL-MCNC: 77 MG/DL — SIGNIFICANT CHANGE UP (ref 70–99)
HCO3 BLDV-SCNC: 18 MMOL/L — LOW (ref 22–29)
HCT VFR BLD CALC: 11.8 % — CRITICAL LOW (ref 34.5–45)
HCT VFR BLD CALC: 27.5 % — LOW (ref 34.5–45)
HCT VFR BLD CALC: 33.7 % — LOW (ref 34.5–45)
HCT VFR BLDA CALC: 28 % — LOW (ref 34.5–46.5)
HGB BLD CALC-MCNC: 9.4 G/DL — LOW (ref 11.5–15.5)
HGB BLD-MCNC: 11.4 G/DL — LOW (ref 11.5–15.5)
HGB BLD-MCNC: 3.7 G/DL — CRITICAL LOW (ref 11.5–15.5)
HGB BLD-MCNC: 9.4 G/DL — LOW (ref 11.5–15.5)
HYPOCHROMIA BLD QL: SIGNIFICANT CHANGE UP
IANC: 24.05 K/UL — HIGH (ref 1.5–8.5)
INR BLD: 1.09 RATIO — SIGNIFICANT CHANGE UP (ref 0.88–1.16)
INR BLD: 1.79 RATIO — HIGH (ref 0.88–1.16)
LACTATE BLDV-MCNC: 2.4 MMOL/L — HIGH (ref 0.5–2)
LG PLATELETS BLD QL AUTO: SLIGHT — SIGNIFICANT CHANGE UP
LYMPHOCYTES # BLD AUTO: 0.24 K/UL — LOW (ref 1–3.3)
LYMPHOCYTES # BLD AUTO: 0.27 K/UL — LOW (ref 1–3.3)
LYMPHOCYTES # BLD AUTO: 0.9 % — LOW (ref 13–44)
LYMPHOCYTES # BLD AUTO: 2 % — LOW (ref 13–44)
MACROCYTES BLD QL: SLIGHT — SIGNIFICANT CHANGE UP
MAGNESIUM SERPL-MCNC: 1.5 MG/DL — LOW (ref 1.6–2.6)
MANUAL SMEAR VERIFICATION: SIGNIFICANT CHANGE UP
MCHC RBC-ENTMCNC: 30 PG — SIGNIFICANT CHANGE UP (ref 27–34)
MCHC RBC-ENTMCNC: 30.3 PG — SIGNIFICANT CHANGE UP (ref 27–34)
MCHC RBC-ENTMCNC: 30.8 PG — SIGNIFICANT CHANGE UP (ref 27–34)
MCHC RBC-ENTMCNC: 31.4 GM/DL — LOW (ref 32–36)
MCHC RBC-ENTMCNC: 33.8 GM/DL — SIGNIFICANT CHANGE UP (ref 32–36)
MCHC RBC-ENTMCNC: 34.2 GM/DL — SIGNIFICANT CHANGE UP (ref 32–36)
MCV RBC AUTO: 88.7 FL — SIGNIFICANT CHANGE UP (ref 80–100)
MCV RBC AUTO: 90.2 FL — SIGNIFICANT CHANGE UP (ref 80–100)
MCV RBC AUTO: 96.7 FL — SIGNIFICANT CHANGE UP (ref 80–100)
METAMYELOCYTES # FLD: 3 % — HIGH (ref 0–0)
MICROCYTES BLD QL: SIGNIFICANT CHANGE UP
MONOCYTES # BLD AUTO: 0 K/UL — SIGNIFICANT CHANGE UP (ref 0–0.9)
MONOCYTES # BLD AUTO: 0 K/UL — SIGNIFICANT CHANGE UP (ref 0–0.9)
MONOCYTES NFR BLD AUTO: 0 % — LOW (ref 2–14)
MONOCYTES NFR BLD AUTO: 0 % — LOW (ref 2–14)
NEUTROPHILS # BLD AUTO: 12.95 K/UL — HIGH (ref 1.8–7.4)
NEUTROPHILS # BLD AUTO: 26.74 K/UL — HIGH (ref 1.8–7.4)
NEUTROPHILS NFR BLD AUTO: 92 % — HIGH (ref 43–77)
NEUTROPHILS NFR BLD AUTO: 93.8 % — HIGH (ref 43–77)
NEUTS BAND # BLD: 3 % — SIGNIFICANT CHANGE UP (ref 0–8)
NEUTS BAND # BLD: 5.3 % — SIGNIFICANT CHANGE UP (ref 0–6)
NRBC # BLD: 0 /100 WBCS — SIGNIFICANT CHANGE UP (ref 0–0)
NRBC # BLD: 3 /100 — HIGH (ref 0–0)
NRBC # BLD: SIGNIFICANT CHANGE UP /100 WBCS (ref 0–0)
PCO2 BLDV: 37 MMHG — LOW (ref 39–42)
PH BLDV: 7.3 — LOW (ref 7.32–7.43)
PHOSPHATE SERPL-MCNC: 2.8 MG/DL — SIGNIFICANT CHANGE UP (ref 2.5–4.5)
PLAT MORPH BLD: NORMAL — SIGNIFICANT CHANGE UP
PLAT MORPH BLD: NORMAL — SIGNIFICANT CHANGE UP
PLATELET # BLD AUTO: 268 K/UL — SIGNIFICANT CHANGE UP (ref 150–400)
PLATELET # BLD AUTO: 300 K/UL — SIGNIFICANT CHANGE UP (ref 150–400)
PLATELET # BLD AUTO: 306 K/UL — SIGNIFICANT CHANGE UP (ref 150–400)
PLATELET COUNT - ESTIMATE: NORMAL — SIGNIFICANT CHANGE UP
PO2 BLDV: 25 MMHG — SIGNIFICANT CHANGE UP
POLYCHROMASIA BLD QL SMEAR: SLIGHT — SIGNIFICANT CHANGE UP
POLYCHROMASIA BLD QL SMEAR: SLIGHT — SIGNIFICANT CHANGE UP
POTASSIUM BLDV-SCNC: 5 MMOL/L — SIGNIFICANT CHANGE UP (ref 3.5–5.1)
POTASSIUM SERPL-MCNC: 3.1 MMOL/L — LOW (ref 3.5–5.3)
POTASSIUM SERPL-MCNC: 5.2 MMOL/L — SIGNIFICANT CHANGE UP (ref 3.5–5.3)
POTASSIUM SERPL-SCNC: 3.1 MMOL/L — LOW (ref 3.5–5.3)
POTASSIUM SERPL-SCNC: 5.2 MMOL/L — SIGNIFICANT CHANGE UP (ref 3.5–5.3)
PROT SERPL-MCNC: 3 GM/DL — LOW (ref 6–8.3)
PROT SERPL-MCNC: 5 G/DL — LOW (ref 6–8.3)
PROTHROM AB SERPL-ACNC: 12.5 SEC — SIGNIFICANT CHANGE UP (ref 10.6–13.6)
PROTHROM AB SERPL-ACNC: 20.2 SEC — HIGH (ref 10.6–13.6)
RBC # BLD: 1.22 M/UL — LOW (ref 3.8–5.2)
RBC # BLD: 3.05 M/UL — LOW (ref 3.8–5.2)
RBC # BLD: 3.8 M/UL — SIGNIFICANT CHANGE UP (ref 3.8–5.2)
RBC # FLD: 13.7 % — SIGNIFICANT CHANGE UP (ref 10.3–14.5)
RBC # FLD: 13.9 % — SIGNIFICANT CHANGE UP (ref 10.3–14.5)
RBC # FLD: 17.6 % — HIGH (ref 10.3–14.5)
RBC BLD AUTO: ABNORMAL
RBC BLD AUTO: ABNORMAL
RH IG SCN BLD-IMP: NEGATIVE — SIGNIFICANT CHANGE UP
RH IG SCN BLD-IMP: NEGATIVE — SIGNIFICANT CHANGE UP
SAO2 % BLDV: 52.6 % — SIGNIFICANT CHANGE UP
SARS-COV-2 RNA SPEC QL NAA+PROBE: SIGNIFICANT CHANGE UP
SODIUM SERPL-SCNC: 135 MMOL/L — SIGNIFICANT CHANGE UP (ref 135–145)
SODIUM SERPL-SCNC: 143 MMOL/L — SIGNIFICANT CHANGE UP (ref 135–145)
TARGETS BLD QL SMEAR: SLIGHT — SIGNIFICANT CHANGE UP
TOXIC GRANULES BLD QL SMEAR: PRESENT — SIGNIFICANT CHANGE UP
TROPONIN I, HIGH SENSITIVITY RESULT: 8.8 NG/L — SIGNIFICANT CHANGE UP
WBC # BLD: 13.63 K/UL — HIGH (ref 3.8–10.5)
WBC # BLD: 25.97 K/UL — HIGH (ref 3.8–10.5)
WBC # BLD: 26.98 K/UL — HIGH (ref 3.8–10.5)
WBC # FLD AUTO: 13.63 K/UL — HIGH (ref 3.8–10.5)
WBC # FLD AUTO: 25.97 K/UL — HIGH (ref 3.8–10.5)
WBC # FLD AUTO: 26.98 K/UL — HIGH (ref 3.8–10.5)

## 2021-10-28 PROCEDURE — 93010 ELECTROCARDIOGRAM REPORT: CPT

## 2021-10-28 PROCEDURE — 99222 1ST HOSP IP/OBS MODERATE 55: CPT | Mod: GC,25

## 2021-10-28 PROCEDURE — XXXXX: CPT

## 2021-10-28 PROCEDURE — 99291 CRITICAL CARE FIRST HOUR: CPT

## 2021-10-28 PROCEDURE — 99292 CRITICAL CARE ADDL 30 MIN: CPT

## 2021-10-28 PROCEDURE — 71045 X-RAY EXAM CHEST 1 VIEW: CPT | Mod: 26

## 2021-10-28 PROCEDURE — 73706 CT ANGIO LWR EXTR W/O&W/DYE: CPT | Mod: 26,50,MA

## 2021-10-28 PROCEDURE — 43255 EGD CONTROL BLEEDING ANY: CPT | Mod: GC

## 2021-10-28 PROCEDURE — 88302 TISSUE EXAM BY PATHOLOGIST: CPT | Mod: 26

## 2021-10-28 PROCEDURE — 99283 EMERGENCY DEPT VISIT LOW MDM: CPT

## 2021-10-28 PROCEDURE — 99291 CRITICAL CARE FIRST HOUR: CPT | Mod: 25,57

## 2021-10-28 PROCEDURE — 71045 X-RAY EXAM CHEST 1 VIEW: CPT | Mod: 26,77

## 2021-10-28 PROCEDURE — 99285 EMERGENCY DEPT VISIT HI MDM: CPT

## 2021-10-28 RX ORDER — CALCIUM GLUCONATE 100 MG/ML
1 VIAL (ML) INTRAVENOUS ONCE
Refills: 0 | Status: COMPLETED | OUTPATIENT
Start: 2021-10-28 | End: 2021-10-28

## 2021-10-28 RX ORDER — MORPHINE SULFATE 50 MG/1
4 CAPSULE, EXTENDED RELEASE ORAL ONCE
Refills: 0 | Status: DISCONTINUED | OUTPATIENT
Start: 2021-10-28 | End: 2021-10-28

## 2021-10-28 RX ORDER — METOCLOPRAMIDE HCL 10 MG
10 TABLET ORAL ONCE
Refills: 0 | Status: COMPLETED | OUTPATIENT
Start: 2021-10-28 | End: 2021-10-28

## 2021-10-28 RX ORDER — SODIUM CHLORIDE 9 MG/ML
2000 INJECTION INTRAMUSCULAR; INTRAVENOUS; SUBCUTANEOUS ONCE
Refills: 0 | Status: COMPLETED | OUTPATIENT
Start: 2021-10-28 | End: 2021-10-28

## 2021-10-28 RX ORDER — PROTHROMBIN COMPLEX CONCENTRATE (HUMAN) 25.5; 16.5; 24; 22; 22; 26 [IU]/ML; [IU]/ML; [IU]/ML; [IU]/ML; [IU]/ML; [IU]/ML
2000 POWDER, FOR SOLUTION INTRAVENOUS ONCE
Refills: 0 | Status: COMPLETED | OUTPATIENT
Start: 2021-10-28 | End: 2021-10-28

## 2021-10-28 RX ORDER — PANTOPRAZOLE SODIUM 20 MG/1
8 TABLET, DELAYED RELEASE ORAL
Qty: 80 | Refills: 0 | Status: DISCONTINUED | OUTPATIENT
Start: 2021-10-28 | End: 2021-10-28

## 2021-10-28 RX ORDER — CHOLECALCIFEROL (VITAMIN D3) 125 MCG
0 CAPSULE ORAL
Qty: 0 | Refills: 0 | DISCHARGE

## 2021-10-28 RX ORDER — PANTOPRAZOLE SODIUM 20 MG/1
40 TABLET, DELAYED RELEASE ORAL ONCE
Refills: 0 | Status: COMPLETED | OUTPATIENT
Start: 2021-10-28 | End: 2021-10-28

## 2021-10-28 RX ORDER — PANTOPRAZOLE SODIUM 20 MG/1
40 TABLET, DELAYED RELEASE ORAL EVERY 12 HOURS
Refills: 0 | Status: DISCONTINUED | OUTPATIENT
Start: 2021-10-28 | End: 2021-11-08

## 2021-10-28 RX ORDER — SODIUM CHLORIDE 9 MG/ML
1000 INJECTION, SOLUTION INTRAVENOUS
Refills: 0 | Status: DISCONTINUED | OUTPATIENT
Start: 2021-10-28 | End: 2021-10-29

## 2021-10-28 RX ORDER — ONDANSETRON 8 MG/1
4 TABLET, FILM COATED ORAL ONCE
Refills: 0 | Status: COMPLETED | OUTPATIENT
Start: 2021-10-28 | End: 2021-10-28

## 2021-10-28 RX ORDER — AMLODIPINE BESYLATE 2.5 MG/1
1 TABLET ORAL
Qty: 0 | Refills: 0 | DISCHARGE

## 2021-10-28 RX ORDER — SODIUM CHLORIDE 9 MG/ML
500 INJECTION, SOLUTION INTRAVENOUS ONCE
Refills: 0 | Status: COMPLETED | OUTPATIENT
Start: 2021-10-28 | End: 2021-10-28

## 2021-10-28 RX ORDER — LIDOCAINE 4 G/100G
0 CREAM TOPICAL
Qty: 0 | Refills: 0 | DISCHARGE

## 2021-10-28 RX ORDER — ACETAMINOPHEN 500 MG
2 TABLET ORAL
Qty: 0 | Refills: 0 | DISCHARGE

## 2021-10-28 RX ADMIN — PANTOPRAZOLE SODIUM 10 MG/HR: 20 TABLET, DELAYED RELEASE ORAL at 12:09

## 2021-10-28 RX ADMIN — MORPHINE SULFATE 4 MILLIGRAM(S): 50 CAPSULE, EXTENDED RELEASE ORAL at 18:26

## 2021-10-28 RX ADMIN — Medication 1 GRAM(S): at 12:40

## 2021-10-28 RX ADMIN — SODIUM CHLORIDE 1000 MILLILITER(S): 9 INJECTION, SOLUTION INTRAVENOUS at 20:14

## 2021-10-28 RX ADMIN — ONDANSETRON 4 MILLIGRAM(S): 8 TABLET, FILM COATED ORAL at 07:43

## 2021-10-28 RX ADMIN — PANTOPRAZOLE SODIUM 40 MILLIGRAM(S): 20 TABLET, DELAYED RELEASE ORAL at 07:43

## 2021-10-28 RX ADMIN — ONDANSETRON 4 MILLIGRAM(S): 8 TABLET, FILM COATED ORAL at 13:07

## 2021-10-28 RX ADMIN — SODIUM CHLORIDE 4000 MILLILITER(S): 9 INJECTION INTRAMUSCULAR; INTRAVENOUS; SUBCUTANEOUS at 07:43

## 2021-10-28 RX ADMIN — PROTHROMBIN COMPLEX CONCENTRATE (HUMAN) 2000 INTERNATIONAL UNIT(S): 25.5; 16.5; 24; 22; 22; 26 POWDER, FOR SOLUTION INTRAVENOUS at 08:37

## 2021-10-28 RX ADMIN — ONDANSETRON 4 MILLIGRAM(S): 8 TABLET, FILM COATED ORAL at 12:09

## 2021-10-28 RX ADMIN — SODIUM CHLORIDE 2000 MILLILITER(S): 9 INJECTION INTRAMUSCULAR; INTRAVENOUS; SUBCUTANEOUS at 08:43

## 2021-10-28 RX ADMIN — Medication 100 GRAM(S): at 12:10

## 2021-10-28 RX ADMIN — PROTHROMBIN COMPLEX CONCENTRATE (HUMAN) 400 INTERNATIONAL UNIT(S): 25.5; 16.5; 24; 22; 22; 26 POWDER, FOR SOLUTION INTRAVENOUS at 08:25

## 2021-10-28 RX ADMIN — Medication 10 MILLIGRAM(S): at 13:32

## 2021-10-28 RX ADMIN — ONDANSETRON 4 MILLIGRAM(S): 8 TABLET, FILM COATED ORAL at 18:17

## 2021-10-28 NOTE — H&P ADULT - ASSESSMENT
81F rectal cancer s/p recent operation two weeks ago LAR with diverting ostomy at [facility] Dr. Mir c/b R cold leg discharged home on eliquis here with acute GI bleed and SBO 2/2 L inguinal hernia    unable to reduce hernia at bedside  vascular surgery plan pending resolution of acute surgical problems    NGT with dark bloody output, ostomy with dark blood output  GI aware, available for intraop scope    Plan:  -admit to Dr. Shade Jones  -appreciate GI for intraoperative scope  protonix BID  -consented and added on for emergent OR L inguinal hernia repair possible bowel resection  -NPO/NGT/IVF  -resume antibiotic coverage for UTI post op    Discussed with Dr. Shade AVINA TEAM SURGERY  [id number]

## 2021-10-28 NOTE — CONSULT NOTE ADULT - ASSESSMENT
***    Recommendations:  -vascular plan pending resolution of other acute (SBO 2/2 L ing hernia and GI bleed)    Seen and discussed with vascular surgery fellow and attending Dr. Van DUDLEY TEAM SURGERY  [id number] 81F raynaud's recent LAR with diverting ostomy for rectal cancer c/b post op R cold leg started on eliquis now here with acute GI bleed and SBO 2/2 L inguinal hernia. Vascular surgery consulted for R cold foot.    Recommendations:  -vascular plan pending resolution of other acute (SBO 2/2 L ing hernia and GI bleed)    Seen and discussed with vascular surgery fellow and attending Dr. Van DUDLEY TEAM SURGERY  [id number]

## 2021-10-28 NOTE — ED ADULT NURSE REASSESSMENT NOTE - NS ED NURSE REASSESS COMMENT FT1
Pt A&OX4. Respirations even and unlabored. NG tube to suction patent. Surgery resident arrived to take pt to OR. Sent with Float RN and EDT on zoll. No acute distress. Denies CP, SOB, N/V/D at this time. No

## 2021-10-28 NOTE — PROVIDER CONTACT NOTE (CRITICAL VALUE NOTIFICATION) - ASSESSMENT
pt a&Ox4, receiving bolus, vs stable, no acute distress at this time. colostomy bag, black stool noted.

## 2021-10-28 NOTE — ED PROVIDER NOTE - CRITICAL CARE ATTENDING CONTRIBUTION TO CARE
81 year old female with gi bleed, anemia, requiring blood and reversal of eliquis, icu and vascular immediately consulted. 81 year old female with gi bleed, anemia, requiring blood and reversal of eliquis, icu and vascular immediately consulted. GI, surgery and ICU consulted. NG tube attempted x2 without success, pt received prbc and ffp, no beds at [facility] so transfer to [facility].

## 2021-10-28 NOTE — ED PROVIDER NOTE - NS ED ROS FT
Gen: Denies fever, weight loss; +lightheadedness  HEENT: Denies vision changes, ear pain, epistaxis, sore throat  CV: Denies chest pain, palpitations  Skin: Denies rash, erythema, color changes  Resp: Denies SOB, cough  Endo: Denies sensitivity to heat, cold, increased urination  GI: +nausea, +hematemesis, +abdominal pain, +melena  Msk: Denies back pain, LE swelling; +leg pain  : Denies dysuria, increased frequency  Neuro: Denies weakness, numbness, tingling; +syncope  Psych: Denies hx of psych, hallucinations  ROS statement: all other ROS negative except as per HPI

## 2021-10-28 NOTE — CONSULT NOTE ADULT - ASSESSMENT
82y/o female with PMH of SLE, Raynaud's, HTN, Rectal CA s/p rectal surgery with colostomy creation present to ED with GI Bleed and Cold Right leg, found to have severe anemia 2/2 acute blood loss, most like from eliquis, cannot exclude active bleeding

## 2021-10-28 NOTE — ED PROVIDER NOTE - PROGRESS NOTE DETAILS
FINDINGS from [facility] 10/16/21  -- CT Angiogram --    Moderate to severe atherosclerosis throughout the arterial vessels.    Abdominal aorta: Patent  Celiac artery: Severe stenosis at its origin, but patent distally; the common hepatic artery has a separate origin directly from the aorta below the celiac origin, which also demonstrates stenosis at its origin.  Superior mesenteric artery: Mild stenosis at its origin, but patent  Right renal artery: Patent  Left renal artery: Patent  Inferior mesenteric artery: Patent    Right common iliac artery: Patent  Right internal iliac artery: Patent  Right external iliac artery: Patent  Left common iliac artery: Patent  Left internal iliac artery: Patent  Left external iliac artery: Patent    Right lower extremity:    Common femoral artery: Patent  Profundus femoris artery: Patent  Superficial femoral artery: Mild multifocal stenoses, but patent  Popliteal artery: Patent  Anterior tibial artery: Multifocal stenoses and areas of occlusion throughout the calf; the vessel is not visualized at the level of the ankle. Dorsalis pedis may be faintly visualized.  Tibioperoneal trunk: Patent  Peroneal artery: Multifocal stenoses within the lower calf, but the vessel remains patent at the level of the ankle.  Posterior tibial artery: Multifocal stenoses and areas of occlusion within the calf; the vessel is not visualized at the level of the ankle. Plantar artery not identified.    Left lower extremity:    Common femoral artery: Patent  Profundus femoris artery: Patent  Superficial femoral artery: Patent  Popliteal artery: Moderate multifocal stenoses, but patent  Anterior tibial artery: Stenosis at its origin. Multifocal stenoses and occlusion within the calf; the vessel is no longer visualized at the level of the ankle. Dorsalis pedis not identified.  Tibioperoneal trunk: Patent  Peroneal artery: Very mild multifocal stenoses in the lower calf, but the vessel remains patent at the level of the ankle  Posterior tibial artery: Mild multifocal stenoses in the calf, but the vessel remains patent at the level of the ankle. Plantar artery is patent. Pt initially stabilized, no vomiting, seen by Dr Pappas, plan is to try and transfer to [facility] or [facility]. Pt vomited blood, will give another unit, gi and surgery consulted. Dr Escobar at bedside, likely not stable for transfer at this time. BELKIS Mir through transfer center. BELKIS Pinon from [facility] no ICU beds available.

## 2021-10-28 NOTE — ED CLERICAL - NS ED CLERK NOTE PRE-ARRIVAL INFORMATION; ADDITIONAL PRE-ARRIVAL INFORMATION
Hb 3.7, black stool via ostomy, nausea and vomiting.  Transfused 3U PRBC, 1st unit plasma to be hung prior to transfer, IV Protonix.  Full code, covid neg.  Right leg painful and cold- CT leg done.  Hx PPM.

## 2021-10-28 NOTE — ED PROVIDER NOTE - ATTENDING CONTRIBUTION TO CARE
80 yo f past medical history sle, rayaud's, htn ppm, recent colon ca resection ostomy creation at [facility] c/b rle arterial occlusion on doac transferred from [facility] for management of  1) high grade bowel obstruction/incarcerated hernia, 2) hematemesis/dark black colostomy outputs GIB 3) RLE arterial occlusion. s/p kcentra, 3 prbc, 2 FFP, and on PPI gtt. + abd pain, nausea improved at time of initial eval. denies lightheadedness, fever chills back pain, leg pain (just right leg coolness). exam as above. OSH labs, ct, notes reviewed. gen surgery/vascular/GI consulted. symptom relief prn. ng tube. admitted for further management.

## 2021-10-28 NOTE — CONSULT NOTE ADULT - SUBJECTIVE AND OBJECTIVE BOX
General Surgery Consult  HPI: 80y/o female with PMH of SLE, Raynaud's, HTN, Rectal CA s/p rectal surgery with colostomy creation present to ED after she passed out. Surgery was 2 weeks ago at [facility], pt states that postop she developed  right leg pain and poikilothermia. Per pt's Daughter pt was seen by vascular surgeon Dr. Guadalupe and was placed on Eliquis with outpatient follow up for which she has appointment tomorrow. Pt was also scheduled to followup with Surgeon Dr. Andrews however appointment was cancelled as doctor is out sick. Hematemesis began early this morning and per daughter pt loss consciousness, denies any fall or trauma. Pt denies abdominal pain, states that color of ostomy output also change from brown to Maroon/ black this morning. Pt H/H on 10/25/21 was 8.3/25.7    Pt had a CTA and Arterial US performed at [facility] that showed:  Right lower extremity:  Common femoral artery: Patent  Profundus femoris artery: Patent  Superficial femoral artery: Mild multifocal stenoses, but patent  Popliteal artery: Patent  Anterior tibial artery: Multifocal stenoses and areas of occlusion throughout the calf; the vessel is not visualized at the level of the ankle. Dorsalis pedis may be faintly visualized.  Tibioperoneal trunk: Patent  Peroneal artery: Multifocal stenoses within the lower calf, but the vessel remains patent at the level of the ankle.  Posterior tibial artery: Multifocal stenoses and areas of occlusion within the calf; the vessel is not visualized at the level of the ankle. Plantar artery not identified.     Pt seen and examined at bedside with Dr. Garcia, since being in ER pt has received 1unit PRBC, currently on second unit, she was also given Kcentra. No further vomiting. Pt hemodynamically stable currently, ICU consulted.    PAST MEDICAL HISTORY:  Hypertension  Osteoporosis  Varicose vein of leg  Raynaud disease    PAST SURGICAL HISTORY:  Inguinal hernia  Rectal surgery with ostomy  creation      MEDICATIONS:  pantoprazole Infusion 8 mG/Hr IV Continuous <Continuous>      ALLERGIES:  ACE inhibitors (Angioedema)  amoxicillin (Angioedema)  irbesartan (Angioedema)  Norvasc (Angioedema)    FAMILY HISTORY:  Father  Family history of acute myocardial infarction    Mother  Family history of stroke   Social Hx: denies smoking, alcohol or any recreational drug use.    VITALS & I/Os:  Vital Signs Last 24 Hrs  T(C): 36.4 (28 Oct 2021 12:43), Max: 36.6 (28 Oct 2021 06:34)  T(F): 97.5 (28 Oct 2021 12:43), Max: 97.9 (28 Oct 2021 07:47)  HR: 84 (28 Oct 2021 12:43) (68 - 96)  BP: 80/53 (28 Oct 2021 12:43) (80/53 - 120/55)  BP(mean): --  RR: 15 (28 Oct 2021 12:43) (15 - 20)  SpO2: 100% (28 Oct 2021 12:43) (100% - 100%)    I&O's Summary    PHYSICAL EXAM:   · CONSTITUTIONAL:   · Appearance: ILL APPEARING  · Distress: MODERATE  · Mentation: awake, alert  · Nourishment: EMACIATED  · EYES: Clear bilaterally, pupils equal, round and reactive to light. Pale schlera  · CARDIAC: Normal rate, regular rhythm.  Heart sounds S1, S2.  No murmurs, rubs or gallops.  · RESPIRATORY: Breath sounds clear and equal bilaterally.  · GASTROINTESTINAL: Abdomen soft, non-tender, no guarding.  · MUSCULOSKELETAL: Spine appears normal, range of motion is not limited, pulses  presnt in b/l femoral and Popliteal, no PT or DP pulses in the right lower leg using doppler, both legs are cold, but the right is colder than left. right foot is mottled with cyanosis of toes.  · NEUROLOGICAL: Alert and oriented, no focal deficits, no motor or sensory deficits.  · SKIN: Pale, cool, dry and intact. No evidence of rash.      LABS:                        3.7    13.63 )-----------( 306      ( 28 Oct 2021 07:41 )             11.8     10-28    143  |  121<H>  |  17  ----------------------------<  77  3.1<L>   |  17<L>  |  0.30<L>    Ca    5.3<LL>      28 Oct 2021 07:41    TPro  3.0<L>  /  Alb  0.8<L>  /  TBili  0.2  /  DBili  x   /  AST  17  /  ALT  9<L>  /  AlkPhos  68  10-28    Lactate:    PT/INR - ( 28 Oct 2021 07:41 )   PT: 20.2 sec;   INR: 1.79 ratio         PTT - ( 28 Oct 2021 07:41 )  PTT:38.4 sec      IMAGING:  < from: Xray Chest 1 View- PORTABLE-Urgent (Xray Chest 1 View- PORTABLE-Urgent .) (10.13.19 @ 05:17) >  FINDINGS/  IMPRESSION:  No consolidation or infiltrate.  No pleural effusion.    Heart size within normal limits.                                                                                                 Vascular Surgery Consult  HPI: 80y/o female with PMH of SLE, Raynaud's, HTN, Rectal CA s/p rectal surgery with colostomy creation present to ED after she passed out. [facility] weeks ago [facility], pt states that postop she developed  right leg pain and poikilothermia. Per pt's Daughter pt was seen by vascular surgeon Dr. Guadalupe and was placed on Eliquis with outpatient follow up for which she has appointment tomorrow. Pt was also scheduled to followup with Surgeon Dr. Andrews however appointment was cancelled as doctor is out sick. Hematemesis began early this morning and per daughter pt loss consciousness, denies any fall or trauma. Pt denies abdominal pain, states that color of ostomy output also change from brown to Maroon/ black this morning. Pt H/H on 10/25/21 was 8.3/25.7    Pt had a CTA and Arterial US performed at [facility] that showed:  Right lower extremity:  Common femoral artery: Patent  Profundus femoris artery: Patent  Superficial femoral artery: Mild multifocal stenoses, but patent  Popliteal artery: Patent  Anterior tibial artery: Multifocal stenoses and areas of occlusion throughout the calf; the vessel is not visualized at the level of the ankle. Dorsalis pedis may be faintly visualized.  Tibioperoneal trunk: Patent  Peroneal artery: Multifocal stenoses within the lower calf, but the vessel remains patent at the level of the ankle.  Posterior tibial artery: Multifocal stenoses and areas of occlusion within the calf; the vessel is not visualized at the level of the ankle. Plantar artery not identified.     Pt seen and examined at bedside with Dr. Garcia, since being in ER pt has received 1unit PRBC, currently on second unit, she was also given Kcentra. No further vomiting. Pt hemodynamically stable currently, ICU consulted.    PAST MEDICAL HISTORY:  Hypertension  Osteoporosis  Varicose vein of leg  Raynaud disease    PAST SURGICAL HISTORY:  Inguinal hernia  Rectal surgery with ostomy  creation      MEDICATIONS:  pantoprazole Infusion 8 mG/Hr IV Continuous <Continuous>      ALLERGIES:  ACE inhibitors (Angioedema)  amoxicillin (Angioedema)  irbesartan (Angioedema)  Norvasc (Angioedema)    FAMILY HISTORY:  Father  Family history of acute myocardial infarction    Mother  Family history of stroke   Social Hx: denies smoking, alcohol or any recreational drug use.    VITALS & I/Os:  Vital Signs Last 24 Hrs  T(C): 36.4 (28 Oct 2021 12:43), Max: 36.6 (28 Oct 2021 06:34)  T(F): 97.5 (28 Oct 2021 12:43), Max: 97.9 (28 Oct 2021 07:47)  HR: 84 (28 Oct 2021 12:43) (68 - 96)  BP: 80/53 (28 Oct 2021 12:43) (80/53 - 120/55)  BP(mean): --  RR: 15 (28 Oct 2021 12:43) (15 - 20)  SpO2: 100% (28 Oct 2021 12:43) (100% - 100%)    I&O's Summary    PHYSICAL EXAM:   · CONSTITUTIONAL:   · Appearance: ILL APPEARING  · Distress: MODERATE  · Mentation: awake, alert  · Nourishment: EMACIATED  · EYES: Clear bilaterally, pupils equal, round and reactive to light. Pale schlera  · CARDIAC: Normal rate, regular rhythm.  Heart sounds S1, S2.  No murmurs, rubs or gallops.  · RESPIRATORY: Breath sounds clear and equal bilaterally.  · GASTROINTESTINAL: Abdomen soft, non-tender, no guarding. ostomy functioning  · MUSCULOSKELETAL: Spine appears normal, range of motion is not limited, pulses  presnt in b/l femoral and Popliteal, no PT or DP pulses in the right lower leg using doppler, both legs are cold, but the right is colder than left. right foot is mottled with cyanosis of toes.  · NEUROLOGICAL: Alert and oriented, no focal deficits, no motor or sensory deficits.  · SKIN: Pale, cool, dry and intact. No evidence of rash.      LABS:                        3.7    13.63 )-----------( 306      ( 28 Oct 2021 07:41 )             11.8     10-28    143  |  121<H>  |  17  ----------------------------<  77  3.1<L>   |  17<L>  |  0.30<L>    Ca    5.3<LL>      28 Oct 2021 07:41    TPro  3.0<L>  /  Alb  0.8<L>  /  TBili  0.2  /  DBili  x   /  AST  17  /  ALT  9<L>  /  AlkPhos  68  10-28    Lactate:    PT/INR - ( 28 Oct 2021 07:41 )   PT: 20.2 sec;   INR: 1.79 ratio         PTT - ( 28 Oct 2021 07:41 )  PTT:38.4 sec      IMAGING:  < from: Xray Chest 1 View- PORTABLE-Urgent (Xray Chest 1 View- PORTABLE-Urgent .) (10.13.19 @ 05:17) >  FINDINGS/  IMPRESSION:  No consolidation or infiltrate.  No pleural effusion.    Heart size within normal limits.

## 2021-10-28 NOTE — ED PROVIDER NOTE - OBJECTIVE STATEMENT
82y/o female with PMH of SLE, Raynaud's, HTN, PPM, Rectal CA s/p rectal surgery with colostomy (2 weeks ago), presented to [facility] s/p syncope and c/o RLE pain and cold toes 80y/o female with PMH of SLE, Raynaud's, HTN, PPM, and recent admission for Rectal CA s/p rectal surgery with colostomy c/b RLE occlussion and subsequently started on eliquis (2 weeks ago), presented to [facility] s/p syncope and hematemesis earlier today. While in [facility] pt was found to be severely anemic (H/H: 3.7/11.8), transfused 3 units PRBC, given Kcentra, and started on PPI gtt. CTA was performed that showed RLE occlusion and high-grade distal small bowel obstruction. Given this pt was transferred to [facility] ED for vascular/general surgery evaluation as well as GI. Pt c/o black blood/stool in colostomy bag, lightheadedness, abd pain, and hematemesis. Denies f/c, CP, SOB, back pain, or weakness/numbness.

## 2021-10-28 NOTE — CONSULT NOTE ADULT - TIME BILLING
time spent in coordination of care ,I spoke with the family,  trying to contact the Docs at [facility], ED and ICU Docs and also Rahul at [facility]

## 2021-10-28 NOTE — ED ADULT NURSE REASSESSMENT NOTE - NS ED NURSE REASSESS COMMENT FT1
pt seen by Dr. Joshi at this time. pt seen and eval by Dr. Ferrari at this time. pt awake, alert, continuous cardiac monitor. ostomy system changed. 1st unit prbc completed. no reaction noted. pending 2nd unit prbc.

## 2021-10-28 NOTE — ED ADULT TRIAGE NOTE - CHIEF COMPLAINT QUOTE
as per EMS pt hd syncopal episode, black emesis, and hypotensive (70/40). Had rectum removed 2 weeks ago. ostomy present. pt receiving IM nausea medication post surgery due to ?qtc prolongation, per EMS. 20g IV in left forearm with 100mL normal saline infused with EMS.

## 2021-10-28 NOTE — ED ADULT NURSE REASSESSMENT NOTE - NS ED NURSE REASSESS COMMENT FT1
patient a&Ox4, 1st PRBC transfusion completed, pt tolerated well. 2nd PRBC transfusion infusing through L AC 20G IV. will continue to monitor. safety maintained.

## 2021-10-28 NOTE — ED PROVIDER NOTE - NEUROLOGICAL, MLM
After Visit Summary   12/3/2018    Sherita Glasgow    MRN: [id number]           Patient Information     Date Of Birth          1986        Visit Information        Provider Department      12/3/2018 9:30 AM Dina Liz APRN [facility]        Today's Diagnoses     HRP (high risk pregnancy), third trimester    -  1       Follow-ups after your visit        Follow-up notes from your care team     Return in about 1 week (around 12/10/2018) for CECELIA.      Your next 10 appointments already scheduled     Dec 10, 2018  9:30 AM CST   RETURN OB with BINH Bell [facility]   [facility]    [facility] [address]  [facility] [address] [phone]            Dec 17, 2018  9:30 AM CST   RETURN OB with Gordon Garcia [facility]   [facility]    [facility] [address]  [address]  [facility] [address] [phone]              Who to contact     Please call your clinic at [phone] to:    Ask questions about your health    Make or cancel appointments    Discuss your medicines    Learn about your test results    Speak to your doctor            Additional Information About Your Visit        MyChart Information     [employer] gives you secure access to your electronic health record. If you see a primary care provider, you can also send messages to your care team and make appointments. If you have questions, please call your primary care clinic.  If you do not have a primary care provider, please call [phone] and they will assist you.      [employer] is an electronic gateway that provides easy, online access to your medical records. With [employer], you can request a clinic appointment, read your test results, renew a prescription or communicate with your care team.     To access your existing  "account, please contact your [facility] or call [phone] for assistance.        Care EveryWhere ID     This is your Care EveryWhere ID. This could be used by other organizations to access your [facility] medical records  [id number]        Your Vitals Were     Pulse Height Last Period BMI (Body Mass Index)          83 1.676 m (5' 5.98\") 03/10/2018 25.63 kg/m2         Blood Pressure from Last 3 Encounters:   12/03/18 115/72   11/26/18 104/67   11/15/18 113/77    Weight from Last 3 Encounters:   12/03/18 72 kg (158 lb 11.2 oz)   11/26/18 73 kg (161 lb)   11/15/18 71.7 kg (158 lb)              We Performed the Following     CBC with platelets        Primary Care Provider Office Phone # Fax #    Margot Razo PA-C [phone]       [address] [place] [id number]        Equal Access to Services     DIANA HADDAD : Hadii aad ku hadasho Soomaali, waaxda luqadaha, qaybta kaalmada adeegyada, waxay idiin haysandy muro . So [facility] [phone].    ATENCIÓN: Si habla español, tiene a zhao disposición servicios gratuitos de asistencia lingüística. Kashif al [phone].    We comply with applicable federal civil rights laws and [place] laws. We do not discriminate on the basis of race, color, national origin, age, disability, sex, sexual orientation, or gender identity.            Thank you!     Thank you for choosing [facility]  for your care. Our goal is always to provide you with excellent care. Hearing back from our patients is one way we can continue to improve our services. Please take a few minutes to complete the written survey that you may receive in the mail after your visit with us. Thank you!             Your Updated Medication List - Protect others around you: Learn how to safely use, store and throw away your medicines at www.disposemymeds.org.          This list is accurate as of 12/3/18 10:02 AM.  Always use your " most recent med list.                   Brand Name Dispense Instructions for use Diagnosis    OMEGA 3 500 PO           prenatal multivitamin w/iron 27-0.8 MG tablet      Take 1 tablet by mouth daily        rizatriptan 10 MG tablet    MAXALT    36 tablet    Take 1 tablet (10 mg) by mouth as needed for migraine    Migraine without aura and without status migrainosus, not intractable       VITAMIN D (CHOLECALCIFEROL) PO      Take 2,000 Units by mouth daily           Alert and oriented, no focal deficits, no motor or sensory deficits.

## 2021-10-28 NOTE — ED PROVIDER NOTE - OBJECTIVE STATEMENT
81 year old female came to the ED after she passed out. She had rectal surgery and had a colostomy placed approximately 2 weeks ago at [facility]. When she woke up from the surgery her right leg was painful and cold. She had a CTA and Arterial US performed that showed:  Right lower extremity:    Common femoral artery: Patent  Profundus femoris artery: Patent  Superficial femoral artery: Mild multifocal stenoses, but patent  Popliteal artery: Patent  Anterior tibial artery: Multifocal stenoses and areas of occlusion throughout the calf; the vessel is not visualized at the level of the ankle. Dorsalis pedis may be faintly visualized.  Tibioperoneal trunk: Patent  Peroneal artery: Multifocal stenoses within the lower calf, but the vessel remains patent at the level of the ankle.  Posterior tibial artery: Multifocal stenoses and areas of occlusion within the calf; the vessel is not visualized at the level of the ankle. Plantar artery not identified.  She was started on eliquis. When she was in the hospital she had frequent vomiting delaying her discharge. She was discharged when her vomiting stopped, but her right leg continued to be cold and painful. Last night the patient started to vomit black, several times and her systolic blood pressure was in the 60's and she passed out. EMS was activated. besides the right leg the patient denies any pain. She passed out in bed and did not hit her head, no abd pain, no chest pain, no sob, no rash. H/H on 10/25/21 was 8.3/25.7

## 2021-10-28 NOTE — H&P ADULT - NSHPLABSRESULTS_GEN_ALL_CORE
IMAGING:    EXAM:  CT ANGIO LWR EXT (W)AW IC BI                            PROCEDURE DATE:  10/28/2021      INTERPRETATION:  CLINICAL INFORMATION: Peripheral artery disease of the legs with arterial thrombus    COMPARISON: None.    CONTRAST/COMPLICATIONS:  IV Contrast: Omnipaque 350  93 cc administered   7 cc discarded  Oral Contrast:  Complications: None reported at time of study completion    CT ANGIOGRAM ABDOMEN, PELVIS, AND LOWER EXTREMITIES:    PROCEDURE:  Initially, nonenhanced CT was obtained through the calves. Then, following the rapid administration of intravenous contrast, CT angiography was performed through the abdomen, pelvis, and lower extremities down to the toes.  Delayed images through the calves were also obtained. Sagittal and coronal reformats as well as 3D reconstructions were performed.    FINDINGS:    CENTRAL ARTERIAL SYSTEM:    ABDOMINAL AORTA: Mild atherosclerotic plaque without aneurysm or occlusion.    Celiac: Stenosis at the origin.  SMA: Normal caliber patent.  THOR: Occluded at the origin. Reconstituted by SMA collaterals.  Renal Arteries: Single bilateral. Normal caliber patent.    RIGHT LOWER EXTREMITY:    Common Iliac artery: Normal.  External Iliac: Normal.  Internal Iliac: Normal.    Common femoral: Diffusely attenuated but patent.  SFA: Diffusely attenuated but patent.  Profunda: Diffusely attenuated but patent.    Popliteal: Normal.  Anterior Tibial (AT): Occluded.  Tibioperoneal trunk (TPT): Patent  Posterior Tibial (PT): Occluded distally  Peroneal: Occluded distally and reconstituted  Dorsalis Pedis (DP): Reconstituted. Severely attenuated.  Plantar: Not well seen    LEFT LOWER EXTREMITY:    Common Iliac artery: Normal caliber patent  External Iliac: Normal caliber patent  Internal Iliac: Normal caliber patent    Common femoral: Normal  SFA: Patent  Profunda: Patent    Popliteal: Mild focal stenosis above the knee.  AT: Occluded  TPT: Normal  PT: Focal stenosis in the midportion, otherwise patent.  Peroneal: Normal.  DP: Not seen  Plantar: Patent    ADDITIONAL FINDINGS: Trace left pleural effusion. The liver, spleen, pancreas, and biliary tree are normal. Gallbladder not well seen. Left ureteral stent with mild left hydronephrosis. The kidneys and remainder of the retroperitoneum are normal. No pelvic abnormalities are seen. High-grade distal small bowel obstruction at the level of a left inguinal hernia. Mild mesenteric edema without pneumatosis or free air. Small ascites. There is a transverse colostomy. Abdominal wall subcutaneous emphysema.    IMPRESSION:       Vascular:  *  AORTOILIAC INFLOW: No significant inflow disease  *  RIGHT LEG: Patent femoral-popliteal arteries. 3 vessel runoff to the foot is occluded, anterior tibial proximally, posterior tibial and peroneal arteries distally. Dorsalis pedis is reconstituted but severely attenuated. Plantar not well seen.  *  LEFT LEG: Patent femoral arteries. Mild focal stenosis of the popliteal above knee without occlusion. Below the knee, there is two-vessel runoff via the posterior tibial and peroneal arteries with focal stenosis at the midportion of the posterior tibial artery. Dorsalis pedis not seen. Plantar patent.    OTHER:  *  High-grade distal small bowel obstruction at the level of a left inguinal hernia. Mild mesenteric edema without pneumatosis or free air.  *  Anterior abdominal wall subcutaneous emphysema. Correlate for recent intervention.      --- End of Report ---      MANDO BIRCH MD; Attending Radiologist  This document has been electronically signed. Oct 28 2021  2:22PM

## 2021-10-28 NOTE — H&P ADULT - ATTENDING COMMENTS
Patient with incarcerated inguinal hernia and upper gi bleed. Patient started on eliquis for limb ischemia now with gi bleed.  plan  gi for scope  monitor hg  emergent OR for incarcerated inguinal hernia    I have personally interviewed and examined this patient, reviewed pertinent labs and imaging, and discussed the case with colleagues, residents, and physician assistants on B Team rounds.    The active care issues are:  1. incarcerated inguinal hernia  2. upper gi bleed    The Acute Care Surgery (B Team) Attending Group Practice:  Dr. Julio Cesar Link, Dr. Lonny Delgado, Dr. Cody Singh, Dr. Shade Jones,     urgent issues - spectra [phone]  nonurgent issues - [phone]  patient appointments or afterhours - [phone]

## 2021-10-28 NOTE — CONSULT NOTE ADULT - SUBJECTIVE AND OBJECTIVE BOX
General Surgery Consult  HPI: 82y/o female with PMH of SLE, Raynaud's, HTN, Rectal CA s/p rectal surgery with colostomy creation present to ED after she passed out. Surgery was 2 weeks ago at [facility], pt states that postop she developed  right leg pain and poikilothermia. Per pt's Daughter pt was seen by vascular surgeon Dr. Guadalupe ([phone]) and was placed on Eliquis with outpatient follow up for which she has appointment tomorrow. Pt was also scheduled to followup with Surgeon Dr. Andrews however appointment was cancelled as doctor is out sick. Hematemesis began early this morning and per daughter pt loss consciousness, denies any fall or trauma. Pt denies abdominal pain, states that color of ostomy output also change from brown to Maroon/ black this morning. Pt H/H on 10/25/21 was 8.3/25.7    Since being in ED pt has received 2unit PRBC, currently on 3rd unit, she was given Kcentra. Dr. Carrington was called and will come in for EGD, Pt has been evaluated by vascular surgery and ICU who recommend pt be transferred. Pt  with one additional episode of maroon colored emesis in ED. Pt with stable vitals currently.    PAST MEDICAL HISTORY:  Hypertension  Osteoporosis  Varicose vein of leg  Raynaud disease    PAST SURGICAL HISTORY:  Inguinal hernia  Rectal surgery with ostomy  creation      MEDICATIONS:  pantoprazole Infusion 8 mG/Hr IV Continuous <Continuous>    ALLERGIES:  ACE inhibitors (Angioedema)  amoxicillin (Angioedema)  irbesartan (Angioedema)  Norvasc (Angioedema)    FAMILY HISTORY:  Father  Family history of acute myocardial infarction  Mother  Family history of stroke   Social Hx: denies smoking, alcohol or any recreational drug use.    VITALS & I/Os:  Vital Signs Last 24 Hrs  T(C): 36.4 (28 Oct 2021 12:43), Max: 36.6 (28 Oct 2021 06:34)  T(F): 97.5 (28 Oct 2021 12:43), Max: 97.9 (28 Oct 2021 07:47)  HR: 84 (28 Oct 2021 12:43) (68 - 96)  BP: 80/53 (28 Oct 2021 12:43) (80/53 - 120/55)  BP(mean): --  RR: 15 (28 Oct 2021 12:43) (15 - 20)  SpO2: 100% (28 Oct 2021 12:43) (100% - 100%)    I&O's Summary    PHYSICAL EXAM:   · CONSTITUTIONAL:   · Appearance: ILL APPEARING  · Distress: MODERATE  · Mentation: awake, alert  · Nourishment: EMACIATED  · EYES: Clear bilaterally, pupils equal, round and reactive to light. Pale schlera  · CARDIAC: Normal rate, regular rhythm.  Heart sounds S1, S2.  No murmurs, rubs or gallops.  · RESPIRATORY: Breath sounds clear and equal bilaterally.  · GASTROINTESTINAL: Abdomen soft, non-tender, no guarding. Ostomy in Right lower quadrant, with air and melena in bag. Incision healed.   · MUSCULOSKELETAL: Spine appears normal, range of motion is not limited, pulses present in b/l femoral and Popliteal, no PT or DP pulses in the right lower leg using doppler, both legs are cold, but the right is colder than left. right foot is mottled with cyanosis of toes.  · NEUROLOGICAL: Alert and oriented, no focal deficits, no motor or sensory deficits.  · SKIN: Pale, cool, dry and intact. No evidence of rash.      LABS:                        3.7    13.63 )-----------( 306      ( 28 Oct 2021 07:41 )             11.8       10-28    143  |  121<H>  |  17  ----------------------------<  77  3.1<L>   |  17<L>  |  0.30<L>    Ca    5.3<LL>      28 Oct 2021 07:41    TPro  3.0<L>  /  Alb  0.8<L>  /  TBili  0.2  /  DBili  x   /  AST  17  /  ALT  9<L>  /  AlkPhos  68  10-28    Lactate:    PT/INR - ( 28 Oct 2021 07:41 )   PT: 20.2 sec;   INR: 1.79 ratio         PTT - ( 28 Oct 2021 07:41 )  PTT:38.4 sec      IMAGING:  < from: Xray Chest 1 View- PORTABLE-Urgent (Xray Chest 1 View- PORTABLE-Urgent .) (10.13.19 @ 05:17) >  FINDINGS/  IMPRESSION:  No consolidation or infiltrate.  No pleural effusion.    Heart size within normal limits.          Assessment and Recommendation:   · Assessment	  82y/o female with PMH of SLE, Raynaud's, HTN, Rectal CA s/p rectal surgery with colostomy creation present to ED with GI Bleed and Cold Right leg, found to have severe anemia 2/2 acute blood loss, most like from eliquis, cannot exclude active bleeding       Problem/Recommendation - 1:  ·  Problem: Anemia due to acute blood loss.   ·  Recommendation: Transfuse PRBC as needed   Q4hr CBC.     Problem/Recommendation - 2:  ·  Problem: GI bleed.   ·  Recommendation: GI consult, discussed with Dr. Carrington who is planning EGD  CTA of abdomen to r/o active bleed.     Problem/Recommendation - 3:  ·  Problem: Cold right foot  ·  Vascular surgery  recommended  pt tranfer to tertiary institution when stable, for possible Vascular intervention as right foot is compromised and pt risks chance of losing foot.        General Surgery Consult  HPI: 80y/o female with PMH of SLE, Raynaud's, HTN, Rectal CA s/p rectal surgery with colostomy creation present to ED after she passed out. Surgery was 2 weeks ago at [facility], pt states that postop she developed  right leg pain and poikilothermia. Per pt's Daughter pt was seen by vascular surgeon Dr. Guadalupe ([phone]) and was placed on Eliquis with outpatient follow up for which she has appointment tomorrow. Pt was also scheduled to followup with Surgeon Dr. Andrews however appointment was cancelled as doctor is out sick. Hematemesis began early this morning and per daughter pt loss consciousness, denies any fall or trauma. Pt denies abdominal pain, states that color of ostomy output also change from brown to Maroon/ black this morning. Pt H/H on 10/25/21 was 8.3/25.7    Since being in ED pt has received 2unit PRBC, currently on 3rd unit, she was given Kcentra. Dr. Carrington was called and will come in for EGD, Pt has been evaluated by vascular surgery and ICU who recommend pt be transferred. Pt  with one additional episode of maroon colored emesis in ED. Pt with stable vitals currently.    PAST MEDICAL HISTORY:  Hypertension  Osteoporosis  Varicose vein of leg  Raynaud disease    PAST SURGICAL HISTORY:  Inguinal hernia  Rectal surgery with ostomy  creation      MEDICATIONS:  pantoprazole Infusion 8 mG/Hr IV Continuous <Continuous>    ALLERGIES:  ACE inhibitors (Angioedema)  amoxicillin (Angioedema)  irbesartan (Angioedema)  Norvasc (Angioedema)    FAMILY HISTORY:  Father  Family history of acute myocardial infarction  Mother  Family history of stroke   Social Hx: denies smoking, alcohol or any recreational drug use.    VITALS & I/Os:  Vital Signs Last 24 Hrs  T(C): 36.4 (28 Oct 2021 12:43), Max: 36.6 (28 Oct 2021 06:34)  T(F): 97.5 (28 Oct 2021 12:43), Max: 97.9 (28 Oct 2021 07:47)  HR: 84 (28 Oct 2021 12:43) (68 - 96)  BP: 80/53 (28 Oct 2021 12:43) (80/53 - 120/55)  BP(mean): --  RR: 15 (28 Oct 2021 12:43) (15 - 20)  SpO2: 100% (28 Oct 2021 12:43) (100% - 100%)    I&O's Summary    PHYSICAL EXAM:   · CONSTITUTIONAL:   · Appearance: ILL APPEARING  · Distress: MODERATE  · Mentation: awake, alert  · Nourishment: EMACIATED  · EYES: Clear bilaterally, pupils equal, round and reactive to light. Pale schlera  · CARDIAC: Normal rate, regular rhythm.  Heart sounds S1, S2.  No murmurs, rubs or gallops.  · RESPIRATORY: Breath sounds clear and equal bilaterally.  · GASTROINTESTINAL: Abdomen soft, non-tender, no guarding. Ostomy in Right lower quadrant, with air and melena in bag. Incision healed.   · MUSCULOSKELETAL: Spine appears normal, range of motion is not limited, pulses present in b/l femoral and Popliteal, no PT or DP pulses in the right lower leg using doppler, both legs are cold, but the right is colder than left. right foot is mottled with cyanosis of toes.  · NEUROLOGICAL: Alert and oriented, no focal deficits, no motor or sensory deficits.  · SKIN: Pale, cool, dry and intact. No evidence of rash.      LABS:                        3.7    13.63 )-----------( 306      ( 28 Oct 2021 07:41 )             11.8       10-28    143  |  121<H>  |  17  ----------------------------<  77  3.1<L>   |  17<L>  |  0.30<L>    Ca    5.3<LL>      28 Oct 2021 07:41    TPro  3.0<L>  /  Alb  0.8<L>  /  TBili  0.2  /  DBili  x   /  AST  17  /  ALT  9<L>  /  AlkPhos  68  10-28    Lactate:    PT/INR - ( 28 Oct 2021 07:41 )   PT: 20.2 sec;   INR: 1.79 ratio         PTT - ( 28 Oct 2021 07:41 )  PTT:38.4 sec      IMAGING:  < from: Xray Chest 1 View- PORTABLE-Urgent (Xray Chest 1 View- PORTABLE-Urgent .) (10.13.19 @ 05:17) >  FINDINGS/  IMPRESSION:  No consolidation or infiltrate.  No pleural effusion.    Heart size within normal limits.        Assessment and Recommendation:   · Assessment	  80y/o female with PMH of SLE, Raynaud's, HTN, Rectal CA s/p rectal surgery with colostomy creation present to ED with GI Bleed and Cold Right leg, found to have severe anemia 2/2 acute blood loss, most like from eliquis, cannot exclude active bleeding. Pt with recent rectal surgery, currently has no abdominal pain and functioning ostomy  No acute surgical intervention at this time would recommend transfer to [facility] where she is known by surgical/vascular team     Problem/Recommendation - 1:  ·  Problem: Anemia due to acute blood loss.   ·  Recommendation: Transfuse PRBC as needed, hold AC    Q4hr CBC.    Hannah     Problem/Recommendation - 2:  ·  Problem: GI bleed.   ·  Recommendation: Dr. Carrington  planning EGD    PPI    F/U CTA of abdomen to r/o active bleed.     Problem/Recommendation - 3:  ·  Problem: Cold right foot  ·  Vascular surgery  recommends  pt be transferred to tertiary institution when stable, for possible Vascular intervention as right foot is compromised and pt risks  losing foot.

## 2021-10-28 NOTE — ED PROVIDER NOTE - CARE PLAN
1 Principal Discharge DX:	SBO (small bowel obstruction)  Secondary Diagnosis:	GI bleed  Secondary Diagnosis:	Ischemic leg

## 2021-10-28 NOTE — ED PROVIDER NOTE - MUSCULOSKELETAL, MLM
Spine appears normal, range of motion is not limited, no pulse in the right lower leg, both legs are cold, but the right is colder than left. Spine appears normal, range of motion is not limited, no pulse in the right lower leg, both legs are cold, but the right is colder than left. right foot is mottled, cyanosis of toes.

## 2021-10-28 NOTE — ED PROVIDER NOTE - CARE PLAN
1 Principal Discharge DX:	GI bleed  Secondary Diagnosis:	Anemia  Secondary Diagnosis:	Cold right foot

## 2021-10-28 NOTE — CONSULT NOTE ADULT - PROBLEM SELECTOR RECOMMENDATION 3
Recommend CTA of lower extremity, doppler done at bedside shows no right PT or DP pulses  Pt with GI Bleed, anticoag is not recommended at this time, pt should tranfer to tertiary institution when stable, for possible Vascular intervention as right foot is compromised and pt risks chance of losing foot.   Dr. Garcia discussed in length with Daughter and Ed attending

## 2021-10-28 NOTE — CONSULT NOTE ADULT - SUBJECTIVE AND OBJECTIVE BOX
Patient is a 81y old  Female who presents with a chief complaint of     80yo F with PMH of CA rectum s/p recent admission to [facility] 2 weeks ago for resection with colostomy placement, also found to have RLE arterial thrombus and started on Eliquis prior to discharge. Pt presents to ED today with coffee-ground vomiting early this morning. In ED, vital signs stable. Black stool coming from colostomy as well. Found to have Hgb 3.7; transfused 3 units PRBC. ICU consulted for acute blood loss anemia in setting of GIB.        Allergies  ACE inhibitors (Angioedema)  amoxicillin (Angioedema)  irbesartan (Angioedema)  Norvasc (Angioedema)    Meds:  GI/NUTRITION  Meds: pantoprazole Infusion 8 mG/Hr IV Continuous <Continuous>    Drug Dosing Weight  Height (cm): 157.5 (28 Oct 2021 06:34)  Weight (kg): 43.1 (28 Oct 2021 06:34)  BMI (kg/m2): 17.4 (28 Oct 2021 06:34)  BSA (m2): 1.39 (28 Oct 2021 06:34)    PAST MEDICAL & SURGICAL HISTORY:  Hypertension  Osteoporosis  Varicose vein of leg  Raynaud disease  Inguinal hernia    FAMILY HISTORY:  Family history of stroke (Mother)  Mother  Family history of acute myocardial infarction (Father)    ADVANCE DIRECTIVES: Full Code    REVIEW OF SYSTEMS: Endorses mild nausea. Denies dizziness, lightheadedness, CP, SOB, abdominal pain or any other complaints at time of exam.     Vital Signs Last 24 Hrs  T(C): 36.4 (28 Oct 2021 14:23), Max: 36.6 (28 Oct 2021 06:34)  T(F): 97.5 (28 Oct 2021 14:23), Max: 97.9 (28 Oct 2021 07:47)  HR: 95 (28 Oct 2021 14:23) (68 - 96)  BP: 106/61 (28 Oct 2021 14:23) (80/53 - 120/55)  RR: 15 (28 Oct 2021 14:23) (15 - 20)  SpO2: 97% (28 Oct 2021 14:23) (97% - 100%)    I&O's Detail    PHYSICAL EXAM  GENERAL: NAD, well-groomed, well-developed  HEAD:  Atraumatic, Normocephalic  EYES: EOMI, PERRLA, conjunctiva and sclera clear  ENMT: No tonsillar erythema, exudates, or enlargement; Moist mucous membranes, Good dentition, No lesions  NECK: Supple, No JVD, Normal thyroid  NERVOUS SYSTEM:  Alert & Oriented X3, Good concentration; Motor Strength 5/5 B/L upper and lower extremities; DTRs 2+ intact and symmetric  CHEST/LUNG: Clear to percussion bilaterally; No rales, rhonchi, wheezing, or rubs  HEART: Regular rate and rhythm; No murmurs, rubs, or gallops  ABDOMEN: Soft, Nontender, Nondistended; Bowel sounds present  EXTREMITIES:  2+ Peripheral Pulses, No clubbing, cyanosis, or edema  LYMPH: No lymphadenopathy noted  SKIN: No rashes or lesions    LABS:                        11.4   25.97 )-----------( 300      ( 28 Oct 2021 13:58 )             33.7   10-28      143  |  121<H>  |  17  ----------------------------<  77  3.1<L>   |  17<L>  |  0.30<L>    Ca    5.3<LL>      28 Oct 2021 07:41    TPro  3.0<L>  /  Alb  0.8<L>  /  TBili  0.2  /  DBili  x   /  AST  17  /  ALT  9<L>  /  AlkPhos  68  10-28    PT/INR - ( 28 Oct 2021 07:41 )   PT: 20.2 sec;   INR: 1.79 ratio    PTT - ( 28 Oct 2021 07:41 )  PTT:38.4 sec    RADIOLOGY:  < from: CT Angio Lower Extremity w/ IV Cont, Bilateral (10.28.21 @ 11:09) >  IMPRESSION:       Vascular:  *  AORTOILIAC INFLOW: No significant inflow disease  *  RIGHT LEG: Patent femoral-popliteal arteries. 3 vessel runoff to the foot is occluded, anterior tibial proximally, posterior tibial and peroneal arteries distally. Dorsalis pedis is reconstituted but severely attenuated. Plantar not well seen.  *  LEFT LEG: Patent femoral arteries. Mild focal stenosis of the popliteal above knee without occlusion. Below the knee, there is two-vessel runoff via theposterior tibial and peroneal arteries with focal stenosis at the midportion of the posterior tibial artery. Dorsalis pedis not seen. Plantar patent.    OTHER:  *  High-grade distal small bowel obstruction at the level of a left inguinal hernia. Mildmesenteric edema without pneumatosis or free air.  *  Anterior abdominal wall subcutaneous emphysema. Correlate for recent intervention.  < end of copied text >

## 2021-10-28 NOTE — CONSULT NOTE ADULT - ATTENDING COMMENTS
80yo F with PMH of CA rectum s/p recent admission to [facility] 2 weeks ago for resection with colostomy placement, also found to have RLE arterial thrombus and started on Eliquis prior to discharge. Now p/w UGIB and Hgb 3.7 and ICU consulted. Pt currently aox3 and has had BP low 100s on initial assessment. Pt  tx 3 u prbc, pcc and fluids and PPI. However pt also noted to have significant PAD and cool R foot w/o dopplerable pulses. D/w Vascular surgery and recommend transfer as will likely need IR angio once medically stabilized to see if any intervention possible as her foot is at hi risk for loss (nakul now that off a/c). D/w ER and Vascular surgery and agreed on tx for facility w/ in house vascular and IR for further management once she is stabilized for GI bleed.  D/w daughter who agrees with plan.
Patient seen/examined in [facility] ED. Transferred from  with presentation of hemoptysis and Hgb 3.7. Patient being evaluated in ED by General Surgery with plan for OR. Vascular surgery consulted for ischemic right foot. Patient reportedly with pain in right foot since undergoing colon surgery at [facility] 2 weeks ago. Was placed on Eliquis. Presently femoral and popliteal pulses palpable. Pedal pulses non-palpable. Pedal signals not present. Patient presently not a candidate for vascular intervention until GI hemorrhage is evaluated/controlled. Will continue to follow.

## 2021-10-28 NOTE — H&P ADULT - NSICDXPASTMEDICALHX_GEN_ALL_CORE_FT
PAST MEDICAL HISTORY:  History of left inguinal hernia     Hypertension     Osteoporosis     Raynaud disease     Rectal cancer     Varicose vein of leg

## 2021-10-28 NOTE — ED ADULT NURSE NOTE - OBJECTIVE STATEMENT
Pt arriving to room 12 A&Ox4 ambulatory at baseline BIBEMS from [facility] for GI bleed. Pt states she vomited blood this AM. Endorsing mild nausea without vomiting. Hx rectal Ca- not currently on tx. Arriving to ED with colostomy bag. Skin intact. No active bleeding. Pt arriving with R arterial clot. R foot cold, purple. Denies CP, SOB, abdominal pain, chills/fevers. VS as charted. Pt arriving with pantoprazole running at 10 ml/hour. 20g IV in RAC. Flushing well. MD simms pending.

## 2021-10-28 NOTE — CONSULT NOTE ADULT - ASSESSMENT
Impression:  1. Melena/coffee ground emesis in colostomy likely post op surgical complications less likely 2/2 PUD vs angioectasia - melena started following her colectomy at [facility]   2. High grade SBO c/b incarcerated hernia  - OSH CTA 10/28 negative for active extravasation     Recommendations:  - Emergent endoscopic evaluation not warranted  - High risk in setting of high grade sbo  - May benefit from urgent surgical intervention  - Keep NPO  - Place NGT  - Trend Hgb, Transfuse if Hgb < 8 considering cardiac history  - Ensure adequate hydration and electrolyte repletion   - f/u General Surgery  - Rest of care per primary team    Thank you for involving us in this patient's care.    Seen and discussed with Attending Dr. Lonny Nunez MD  Gastroenterology/Hepatology Fellow, PGY-V    NON-URGENT CONSULTS:  Please email [email or web address]  AT NIGHT AND ON WEEKENDS:  Contact on-call GI fellow via answering service ([phone]) from 5pm-8am and on weekends/holidays  MONDAY-FRIDAY 8AM-5PM:  Pager# [phone] ([facility])  GI Phone# [phone] ([facility]) Impression:  1. Melena/coffee ground emesis in colostomy likely post op surgical complications less likely 2/2 PUD vs angioectasia - melena started following her colectomy at [facility]   2. High grade SBO c/b possible incarcerated hernia  3. Leukocytosis c/b lactic acidosis   - OSH CTA 10/28 negative for active extravasation     Recommendations:  - Emergent endoscopic evaluation not warranted  - High risk in setting of high grade sbo  - May benefit from urgent surgical intervention  - Keep NPO  - Place NGT  - Trend Hgb, Transfuse if Hgb < 8 considering cardiac history  - Ensure adequate hydration and electrolyte repletion   - f/u General Surgery  - Rest of care per primary team    Thank you for involving us in this patient's care.    Seen and discussed with Attending Dr. Lonny Nunez MD  Gastroenterology/Hepatology Fellow, PGY-V    NON-URGENT CONSULTS:  Please email [email or web address]  AT NIGHT AND ON WEEKENDS:  Contact on-call GI fellow via answering service ([phone]) from 5pm-8am and on weekends/holidays  MONDAY-FRIDAY 8AM-5PM:  Pager# [phone] ([facility])  GI Phone# [phone] ([facility]) DAVID QUEEN is a 81y Female with a PPM in place, unclear why, h/o rectal CA s/p rectal surgery with colostomy about 2 weeks prior to presentation at [facility] c/b ?ischemic leg with RLE thrombus started on Eliquis who initially presented to outside hospital with reports coffee ground emesis/melena from colostomy, GI consulted for further evaluation.     Impression:  1. Coffee ground emesis/melena in colostomy likely post op surgical complications less likely 2/2 PUD vs angioectasia - melena started following her colectomy at [facility], around the same time of starting Eliquis for LE thrombus  2. High grade SBO c/b possible incarcerated hernia  3. Leukocytosis c/b lactic acidosis   4. LE Thrombus/PVD  - OSH CTA 10/28 negative for active extravasation     Recommendations:  - Emergent endoscopic evaluation not warranted  - High risk for endoscopic evaluation in setting of high grade sbo  - May benefit from urgent surgical intervention  - Keep NPO  - Place NGT  - PPI IV BID  - Trend Hgb, Transfuse if Hgb < 8 considering cardiac history, which remains unclear  - Ensure adequate hydration and electrolyte repletion   - f/u General Surgery  - Rest of care per primary team    Thank you for involving us in this patient's care. Will continue to follow.    Seen and discussed with Attending Dr. Lonny Nunez MD  Gastroenterology/Hepatology Fellow, PGY-V    NON-URGENT CONSULTS:  Please email [email or web address]  [email or web address]  AT NIGHT AND ON WEEKENDS:  Contact on-call GI fellow via answering service ([phone]) from 5pm-8am and on weekends/holidays  MONDAY-FRIDAY 8AM-5PM:  Pager# [phone] ([facility])  GI Phone# [phone] ([facility])

## 2021-10-28 NOTE — ED ADULT TRIAGE NOTE - CHIEF COMPLAINT QUOTE
Pt transfer from [facility] for GI bleed and RT leg thrombosis. Pt H&H at [facility] was 3.7. Pt received 3 units PRBCs at [facility] with no complications. Pt arrives with plasma and Protonix infusing. PMH HTN, Pace Maker, Rectal CA. Pt arrives with 20g IV in left forearm and 20g in right arm x2. Pt also arrives with colostomy. Plasma infusion completed while in triage @0109

## 2021-10-28 NOTE — CONSULT NOTE ADULT - SUBJECTIVE AND OBJECTIVE BOX
Chief Complaint:  Patient is a 81y old  Female who presents with a chief complaint of     HPI:DAVID QUEEN is a 81y Female    PMHX/PSHX:  Hypertension    Osteoporosis    Varicose vein of leg    Raynaud disease    Inguinal hernia      Allergies:  ACE inhibitors (Angioedema)  amoxicillin (Angioedema)  irbesartan (Angioedema)  Norvasc (Angioedema)      Home Medications: reviewed  Hospital Medications:      Social History:   Tob: Denies  EtOH: Denies  Illicit Drugs: Denies    Family history:  Family history of stroke (Mother)    Family history of acute myocardial infarction (Father)      Denies family history of colon cancer/polyps, stomach cancer/polyps, pancreatic cancer/masses, liver cancer/disease, ovarian cancer and endometrial cancer.    ROS:   General:  No  fevers, chills, night sweats, fatigue  Eyes:  Good vision, no reported pain  ENT:  No sore throat, pain, runny nose  CV:  No pain, palpitations  Pulm:  No dyspnea, cough  GI:  See HPI, otherwise negative  :  No  incontinence, nocturia  Muscle:  No pain, weakness  Neuro:  No memory problems  Psych:  No insomnia, mood problems, depression  Endocrine:  No polyuria, polydipsia, cold/heat intolerance  Heme:  No petechiae, ecchymosis, easy bruisability  Skin:  No rash    PHYSICAL EXAM:   Vital Signs:  Vital Signs Last 24 Hrs  T(C): 36.7 (28 Oct 2021 16:22), Max: 37 (28 Oct 2021 15:25)  T(F): 98 (28 Oct 2021 16:22), Max: 98.6 (28 Oct 2021 15:25)  HR: 96 (28 Oct 2021 16:22) (68 - 106)  BP: 123/70 (28 Oct 2021 16:22) (80/53 - 123/76)  BP(mean): --  RR: 20 (28 Oct 2021 16:22) (15 - 20)  SpO2: 100% (28 Oct 2021 16:22) (97% - 100%)  Daily Height in cm: 157.48 (28 Oct 2021 15:25)    Daily     GENERAL: no acute distress  NEURO: alert  HEENT: anicteric sclera, no conjunctival pallor appreciated  CHEST: no respiratory distress, no accessory muscle use  CARDIAC: regular rate, rhythm  ABDOMEN: soft, non-tender, non-distended, no rebound or guarding  EXTREMITIES: warm, well perfused, no edema  SKIN: no lesions noted    LABS: reviewed                        9.4    26.98 )-----------( 268      ( 28 Oct 2021 17:14 )             27.5     10-28    143  |  121<H>  |  17  ----------------------------<  77  3.1<L>   |  17<L>  |  0.30<L>    Ca    5.3<LL>      28 Oct 2021 07:41    TPro  3.0<L>  /  Alb  0.8<L>  /  TBili  0.2  /  DBili  x   /  AST  17  /  ALT  9<L>  /  AlkPhos  68  10-28    LIVER FUNCTIONS - ( 28 Oct 2021 07:41 )  Alb: 0.8 g/dL / Pro: 3.0 gm/dL / ALK PHOS: 68 U/L / ALT: 9 U/L / AST: 17 U/L / GGT: x               Diagnostic Studies: see sunrise for full report         Chief Complaint: melena     HPI:DAVID QUEEN is a 81y Female    In ED, patient was HDS    PMHX/PSHX:  Hypertension    Osteoporosis    Varicose vein of leg    Raynaud disease    Inguinal hernia      Allergies:  ACE inhibitors (Angioedema)  amoxicillin (Angioedema)  irbesartan (Angioedema)  Norvasc (Angioedema)      Home Medications: reviewed  Hospital Medications:      Social History:   Tob: Denies  EtOH: Denies  Illicit Drugs: Denies    Family history:  Family history of stroke (Mother)    Family history of acute myocardial infarction (Father)      Denies family history of colon cancer/polyps, stomach cancer/polyps, pancreatic cancer/masses, liver cancer/disease, ovarian cancer and endometrial cancer.    ROS:   General:  No  fevers, chills, night sweats, fatigue  Eyes:  Good vision, no reported pain  ENT:  No sore throat, pain, runny nose  CV:  No pain, palpitations  Pulm:  No dyspnea, cough  GI:  See HPI, otherwise negative  :  No  incontinence, nocturia  Muscle:  No pain, weakness  Neuro:  No memory problems  Psych:  No insomnia, mood problems, depression  Endocrine:  No polyuria, polydipsia, cold/heat intolerance  Heme:  No petechiae, ecchymosis, easy bruisability  Skin:  No rash    PHYSICAL EXAM:   Vital Signs:  Vital Signs Last 24 Hrs  T(C): 36.7 (28 Oct 2021 16:22), Max: 37 (28 Oct 2021 15:25)  T(F): 98 (28 Oct 2021 16:22), Max: 98.6 (28 Oct 2021 15:25)  HR: 96 (28 Oct 2021 16:22) (68 - 106)  BP: 123/70 (28 Oct 2021 16:22) (80/53 - 123/76)  BP(mean): --  RR: 20 (28 Oct 2021 16:22) (15 - 20)  SpO2: 100% (28 Oct 2021 16:22) (97% - 100%)  Daily Height in cm: 157.48 (28 Oct 2021 15:25)    Daily     GENERAL: no acute distress  NEURO: alert  HEENT: anicteric sclera, no conjunctival pallor appreciated  CHEST: no respiratory distress, no accessory muscle use  CARDIAC: regular rate, rhythm  ABDOMEN: soft, non-tender, non-distended, no rebound or guarding  EXTREMITIES: warm, well perfused, no edema  SKIN: no lesions noted    LABS: reviewed                        9.4    26.98 )-----------( 268      ( 28 Oct 2021 17:14 )             27.5     10-28    143  |  121<H>  |  17  ----------------------------<  77  3.1<L>   |  17<L>  |  0.30<L>    Ca    5.3<LL>      28 Oct 2021 07:41    TPro  3.0<L>  /  Alb  0.8<L>  /  TBili  0.2  /  DBili  x   /  AST  17  /  ALT  9<L>  /  AlkPhos  68  10-28    LIVER FUNCTIONS - ( 28 Oct 2021 07:41 )  Alb: 0.8 g/dL / Pro: 3.0 gm/dL / ALK PHOS: 68 U/L / ALT: 9 U/L / AST: 17 U/L / GGT: x               Diagnostic Studies: see sunrise for full report         Chief Complaint: melena/coffee ground emesis     HPI:DAVID QUEEN is a 81y Female with a PPM in place, unclear why, h/o rectal CA s/p rectal surgery with colostomy about 2 weeks prior to presentation at [facility] c/b ?ischemic leg with RLE thrombus started on Eliquis who initially presented to outside hospital with reports coffee ground emesis/melena from colostomy, GI consulted for further evaluation.     Patient/daughter states that this morning patient had an episode of n/v c/b diffuse abdominal pain and subsequently vomiting. Says the vomit looked like black grainy substance. Per patient, mother had LOC during these episodes so she was taken to the hospital. This is the first time this has happened, denied any previous episodes of vomiting blood. But has reportedly had black output from her ostomy since she had surgery 2 weeks ago. Also reporting abdominal distension, no f/c.     At outside hospital patient was noted to have Hgb 3.7 from, reportedly, 8.3 on 10/25 with melena noted in ostomy and low BP. CTA CAP at OSH did not mention active extravasation, but was significant for a high grade SBO at the level of the L inguinal hernia and anterior abdominal wall subcutaneous emphysema. Per chart she started on ppi gtt, was transfused 3 units of blood and transferred to [facility] for further evaluation of the melena/surgical evaluation.     She has been afebrile and HDS at [facility], though tachycardic, Hgb 11.4 with  and INR 1.79. White count 26 from 13 at OSH and Cr/BUN wnl. Lactate 2.4. On exam there is melenic liquid in her colostomy bag, she is distended and tympanic, with tenderness in her L groin and complaining of nausea.      In ED, patient was HDS    PMHX/PSHX:  Hypertension    Osteoporosis    Varicose vein of leg    Raynaud disease    Inguinal hernia      Allergies:  ACE inhibitors (Angioedema)  amoxicillin (Angioedema)  irbesartan (Angioedema)  Norvasc (Angioedema)      Home Medications: reviewed  Hospital Medications:      Social History:   Tob: Denies  EtOH: Denies  Illicit Drugs: Denies    Family history:  Family history of stroke (Mother)    Family history of acute myocardial infarction (Father)      Denies family history of colon cancer/polyps, stomach cancer/polyps, pancreatic cancer/masses, liver cancer/disease, ovarian cancer and endometrial cancer.    ROS:   Complete and normal except as mentioned above.    PHYSICAL EXAM:   Vital Signs:  Vital Signs Last 24 Hrs  T(C): 36.7 (28 Oct 2021 16:22), Max: 37 (28 Oct 2021 15:25)  T(F): 98 (28 Oct 2021 16:22), Max: 98.6 (28 Oct 2021 15:25)  HR: 96 (28 Oct 2021 16:22) (68 - 106)  BP: 123/70 (28 Oct 2021 16:22) (80/53 - 123/76)  BP(mean): --  RR: 20 (28 Oct 2021 16:22) (15 - 20)  SpO2: 100% (28 Oct 2021 16:22) (97% - 100%)  Daily Height in cm: 157.48 (28 Oct 2021 15:25)    Daily     GENERAL: no acute distress  NEURO: alert  HEENT: anicteric sclera, no conjunctival pallor appreciated  CHEST: no respiratory distress, no accessory muscle use  CARDIAC: regular rate, rhythm  ABDOMEN: soft, tender l inguinal area, distended, non tender abdomen but tympanic, no rebound or guarding, black liquid stool in colostomy bag  EXTREMITIES: warm, well perfused, no edema  SKIN: no lesions noted    LABS: reviewed                        9.4    26.98 )-----------( 268      ( 28 Oct 2021 17:14 )             27.5     10-28    143  |  121<H>  |  17  ----------------------------<  77  3.1<L>   |  17<L>  |  0.30<L>    Ca    5.3<LL>      28 Oct 2021 07:41    TPro  3.0<L>  /  Alb  0.8<L>  /  TBili  0.2  /  DBili  x   /  AST  17  /  ALT  9<L>  /  AlkPhos  68  10-28    LIVER FUNCTIONS - ( 28 Oct 2021 07:41 )  Alb: 0.8 g/dL / Pro: 3.0 gm/dL / ALK PHOS: 68 U/L / ALT: 9 U/L / AST: 17 U/L / GGT: x               Diagnostic Studies: see sunrise for full report

## 2021-10-28 NOTE — ED ADULT NURSE REASSESSMENT NOTE - NS ED NURSE REASSESS COMMENT FT1
pt received @07:00, A&ox4. room air. pt hypotensive to 86/45 HR 90, Dr. Shahriar ROMERO made aware. 1L bolus restarted, 2nd liter. ostomy noted. pt on tele, PM in place. will continue to monitor.

## 2021-10-28 NOTE — ED PROVIDER NOTE - CLINICAL SUMMARY MEDICAL DECISION MAKING FREE TEXT BOX
80y/o female with PMH of SLE, Raynaud's, HTN, PPM, and recent admission for Rectal CA s/p rectal surgery with colostomy c/b RLE occlussion and subsequently started on eliquis (2 weeks ago), transferred to [facility] s/p syncope and hematemesis. Found to have RLE occlusion, high-grade SBO, and GI bleeding.  Plan to repeat labs here - cbc, cmp, vbg, MG/Phos, coags, T+S. Transfuse as necessary.  Will consult general/vascular surgery and GI. Await recs.  Will continue PPI gtt.

## 2021-10-28 NOTE — ED ADULT NURSE NOTE - NSICDXPASTMEDICALHX_GEN_ALL_CORE_FT
PAST MEDICAL HISTORY:  Hypertension     Osteoporosis     Raynaud disease     Varicose vein of leg

## 2021-10-28 NOTE — ED PROVIDER NOTE - PHYSICAL EXAMINATION
PHYSICAL EXAM:  GENERAL: ill appearing; in no respiratory distress  HEENT: Atraumatic, Normocephalic, PERRL, EOMs intact b/l w/out deficits, no conjunctival pallor, MMM  NECK: No JVD; FROM  CHEST/LUNG: CTAB no wheezes/rhonchi/rales  HEART: RRR no murmur/gallops/rubs  ABDOMEN: +BS, soft, NT, distended, RLQ colostomy bag w/ black blood/stool  EXTREMITIES: No LE edema, +2 radial pulses b/l; poorly palpable, but dopplerable Rt DP and PT pulses. RLE cold to touch and pale, w/ mottled/cyanotic toes.   MUSCULOSKELETAL: FROM of all 4 extremities  NERVOUS SYSTEM:  A&Ox3, No motor deficits or sensory deficits; CNII-XII intact; no focal neurologic deficits  SKIN:  No new rashes

## 2021-10-28 NOTE — CONSULT NOTE ADULT - SUBJECTIVE AND OBJECTIVE BOX
***                                                                                             General Surgery Consult  Consulting surgical team: C TEAM SURGERY  Consulting attending: Dr. Araujo    HPI:  81F hx of Raynaud's, lupus, HTN, known L inguinal hernia planned for repair later this year, rectal cancer s/p LAR and diverting transverse colostomy at [facility] 2 weeks ago c/b post op R cold leg on eliquis here with 1 day of hematemesis and melena through ostomy found to have high grade SBO 2/2 L inguinal hernia and 3v occlusion to R foot. Palpable femoral and popliteal RLE, no pedal signals, R distal foot blue/purplish in color. pain in R foot, able to ambulate with assistance. Motor and sensation intact in RLE. Vascular surgery consulted for cold leg. Currently admitted to gen surg going emergently to OR for L inguinal hernia repair and scope with GI. Holding AC given acute GI bleed.    Previously known to Dr. Araujo, seen 4/2021 outpatient for venous insufficiency and PAD    PAST MEDICAL HISTORY:  Hypertension    Osteoporosis    Varicose vein of leg    Raynaud disease        PAST SURGICAL HISTORY:  Inguinal hernia    LAR with diverting ostomy 2 weeks ago      MEDICATIONS:  see H&P med rec    ALLERGIES:  ACE inhibitors (Angioedema)  amoxicillin (Angioedema)  irbesartan (Angioedema)  Norvasc (Angioedema)      VITALS & I/Os:  Vital Signs Last 24 Hrs  T(C): 36.7 (28 Oct 2021 16:22), Max: 37 (28 Oct 2021 15:25)  T(F): 98 (28 Oct 2021 16:22), Max: 98.6 (28 Oct 2021 15:25)  HR: 96 (28 Oct 2021 16:22) (68 - 106)  BP: 123/70 (28 Oct 2021 16:22) (80/53 - 123/76)  BP(mean): --  RR: 20 (28 Oct 2021 16:22) (15 - 20)  SpO2: 100% (28 Oct 2021 16:22) (97% - 100%)    I&O's Summary      PHYSICAL EXAM:  General: No acute distress, pleasant oriented  Respiratory: Nonlabored  Cardiovascular: appears well perfused  Abdominal: Soft, nondistended, L groin TTP mild. No rebound or guarding. No organomegaly, no palpable mass.  Extremities:   R distal foot cool and painful to touch, blue/purplish in color  motor and sensation intact RLE  bilateral femoral/pop 2+ palpable  no R pedal signals  L PT palpable    LABS:                        9.4    26.98 )-----------( 268      ( 28 Oct 2021 17:14 )             27.5     10-28    135  |  105  |  24<H>  ----------------------------<  131<H>  5.2   |  16<L>  |  0.68    Ca    7.8<L>      28 Oct 2021 17:14  Phos  2.8     10-28  Mg     1.50     10-28    TPro  5.0<L>  /  Alb  2.6<L>  /  TBili  0.9  /  DBili  x   /  AST  19  /  ALT  17  /  AlkPhos  82  10-28    Lactate:  10-28 @ 17:08  2.4    PT/INR - ( 28 Oct 2021 17:14 )   PT: 12.5 sec;   INR: 1.09 ratio         PTT - ( 28 Oct 2021 17:14 )  PTT:23.7 sec    IMAGING    EXAM:  CT ANGIO LWR EXT (W)AW IC BI                            PROCEDURE DATE:  10/28/2021          INTERPRETATION:  CLINICAL INFORMATION: Peripheral artery disease of the legs with arterial thrombus    COMPARISON: None.    CONTRAST/COMPLICATIONS:  IV Contrast: Omnipaque 350  93 cc administered   7 cc discarded  Oral Contrast:  Complications: None reported at time of study completion    CT ANGIOGRAM ABDOMEN, PELVIS, AND LOWER EXTREMITIES:    PROCEDURE:  Initially, nonenhanced CT was obtained through the calves. Then, following the rapid administration of intravenous contrast, CT angiography was performed through the abdomen, pelvis, and lower extremities down to the toes.  Delayed images through the calves were also obtained. Sagittal and coronal reformats as well as 3D reconstructions were performed.    FINDINGS:    CENTRAL ARTERIAL SYSTEM:    ABDOMINAL AORTA: Mild atherosclerotic plaque without aneurysm or occlusion.    Celiac: Stenosis at the origin.  SMA: Normal caliber patent.  THOR: Occluded at the origin. Reconstituted by SMA collaterals.  Renal Arteries: Single bilateral. Normal caliber patent.    RIGHT LOWER EXTREMITY:    Common Iliac artery: Normal.  External Iliac: Normal.  Internal Iliac: Normal.    Common femoral: Diffusely attenuated but patent.  SFA: Diffusely attenuated but patent.  Profunda: Diffusely attenuated but patent.    Popliteal: Normal.  Anterior Tibial (AT): Occluded.  Tibioperoneal trunk (TPT): Patent  Posterior Tibial (PT): Occluded distally  Peroneal: Occluded distally and reconstituted  Dorsalis Pedis (DP): Reconstituted. Severely attenuated.  Plantar: Not well seen    LEFT LOWER EXTREMITY:    Common Iliac artery: Normal caliber patent  External Iliac: Normal caliber patent  Internal Iliac: Normal caliber patent    Common femoral: Normal  SFA: Patent  Profunda: Patent    Popliteal: Mild focal stenosis above the knee.  AT: Occluded  TPT: Normal  PT: Focal stenosis in the midportion, otherwise patent.  Peroneal: Normal.  DP: Not seen  Plantar: Patent    ADDITIONAL FINDINGS: Trace left pleural effusion. The liver, spleen, pancreas, and biliary tree are normal. Gallbladder not well seen. Left ureteral stent with mild left hydronephrosis. The kidneys and remainder of the retroperitoneum are normal. No pelvic abnormalities are seen. High-grade distal small bowel obstruction at the level of a left inguinal hernia. Mild mesenteric edema without pneumatosis or free air. Small ascites. There is a transverse colostomy. Abdominal wall subcutaneous emphysema.    IMPRESSION:       Vascular:  *  AORTOILIAC INFLOW: No significant inflow disease  *  RIGHT LEG: Patent femoral-popliteal arteries. 3 vessel runoff to the foot is occluded, anterior tibial proximally, posterior tibial and peroneal arteries distally. Dorsalis pedis is reconstituted but severely attenuated. Plantar not well seen.  *  LEFT LEG: Patent femoral arteries. Mild focal stenosis of the popliteal above knee without occlusion. Below the knee, there is two-vessel runoff via the posterior tibial and peroneal arteries with focal stenosis at the midportion of the posterior tibial artery. Dorsalis pedis not seen. Plantar patent.    OTHER:  *  High-grade distal small bowel obstruction at the level of a left inguinal hernia. Mild mesenteric edema without pneumatosis or free air.  *  Anterior abdominal wall subcutaneous emphysema. Correlate for recent intervention.      --- End of Report ---      MANDO BIRCH MD; Attending Radiologist  This document has been electronically signed. Oct 28 2021  2:22PM

## 2021-10-28 NOTE — H&P ADULT - NSICDXPASTSURGICALHX_GEN_ALL_CORE_FT
PAST SURGICAL HISTORY:  History of low anterior resection of rectum diverting colostomy 10/2021 for rectal cancer    Inguinal hernia

## 2021-10-28 NOTE — H&P ADULT - HISTORY OF PRESENT ILLNESS
General Surgery Consult  Consulting surgical team: B TEAM SURGERY  Consulting attending: Dr. Shade Jones    HPI:  81F hx of Raynaud's, lupus, HTN, known L inguinal hernia planned for repair later this year, rectal cancer s/p LAR and diverting transverse colostomy at [facility] 2 weeks ago c/b post op R cold leg on eliquis here with 1 day of hematemesis and melena through ostomy found to have high grade SBO 2/2 L inguinal hernia. Attempted to reduce at bedside after NGT placement, so far unsuccessful. NGT with dark blood output, GI aware. Vascular surgery no acute plan for R foot at this time given acute GI bleed and SBO, AC when ok with surgery/GI teams. WBC 25, hgb 11 --> 9.     PAST MEDICAL HISTORY:  Hypertension    Osteoporosis    Varicose vein of leg    Raynaud disease        PAST SURGICAL HISTORY:  Inguinal hernia        MEDICATIONS:      ALLERGIES:  ACE inhibitors (Angioedema)  amoxicillin (Angioedema)  irbesartan (Angioedema)  Norvasc (Angioedema)      VITALS & I/Os:  Vital Signs Last 24 Hrs  T(C): 36.7 (28 Oct 2021 16:22), Max: 37 (28 Oct 2021 15:25)  T(F): 98 (28 Oct 2021 16:22), Max: 98.6 (28 Oct 2021 15:25)  HR: 96 (28 Oct 2021 16:22) (68 - 106)  BP: 123/70 (28 Oct 2021 16:22) (80/53 - 123/76)  BP(mean): --  RR: 20 (28 Oct 2021 16:22) (15 - 20)  SpO2: 100% (28 Oct 2021 16:22) (97% - 100%)    I&O's Summary      PHYSICAL EXAM:  General: No acute distress, pleasant oriented  Respiratory: Nonlabored  Cardiovascular: appears well perfused  Abdominal: Soft, nondistended, L groin TTP mild. No rebound or guarding. No organomegaly, no palpable mass.  Extremities: Warm    LABS:                        9.4    26.98 )-----------( 268      ( 28 Oct 2021 17:14 )             27.5     10-28    135  |  105  |  24<H>  ----------------------------<  131<H>  5.2   |  16<L>  |  0.68    Ca    7.8<L>      28 Oct 2021 17:14  Phos  2.8     10-28  Mg     1.50     10-28    TPro  5.0<L>  /  Alb  2.6<L>  /  TBili  0.9  /  DBili  x   /  AST  19  /  ALT  17  /  AlkPhos  82  10-28    Lactate:  10-28 @ 17:08  2.4    PT/INR - ( 28 Oct 2021 17:14 )   PT: 12.5 sec;   INR: 1.09 ratio         PTT - ( 28 Oct 2021 17:14 )  PTT:23.7 sec              IMAGING:    EXAM:  CT ANGIO LWR EXT (W)AW IC BI                            PROCEDURE DATE:  10/28/2021          INTERPRETATION:  CLINICAL INFORMATION: Peripheral artery disease of the legs with arterial thrombus    COMPARISON: None.    CONTRAST/COMPLICATIONS:  IV Contrast: Omnipaque 350  93 cc administered   7 cc discarded  Oral Contrast:  Complications: None reported at time of study completion    CT ANGIOGRAM ABDOMEN, PELVIS, AND LOWER EXTREMITIES:    PROCEDURE:  Initially, nonenhanced CT was obtained through the calves. Then, following the rapid administration of intravenous contrast, CT angiography was performed through the abdomen, pelvis, and lower extremities down to the toes.  Delayed images through the calves were also obtained. Sagittal and coronal reformats as well as 3D reconstructions were performed.    FINDINGS:    CENTRAL ARTERIAL SYSTEM:    ABDOMINAL AORTA: Mild atherosclerotic plaque without aneurysm or occlusion.    Celiac: Stenosis at the origin.  SMA: Normal caliber patent.  THOR: Occluded at the origin. Reconstituted by SMA collaterals.  Renal Arteries: Single bilateral. Normal caliber patent.    RIGHT LOWER EXTREMITY:    Common Iliac artery: Normal.  External Iliac: Normal.  Internal Iliac: Normal.    Common femoral: Diffusely attenuated but patent.  SFA: Diffusely attenuated but patent.  Profunda: Diffusely attenuated but patent.    Popliteal: Normal.  Anterior Tibial (AT): Occluded.  Tibioperoneal trunk (TPT): Patent  Posterior Tibial (PT): Occluded distally  Peroneal: Occluded distally and reconstituted  Dorsalis Pedis (DP): Reconstituted. Severely attenuated.  Plantar: Not well seen    LEFT LOWER EXTREMITY:    Common Iliac artery: Normal caliber patent  External Iliac: Normal caliber patent  Internal Iliac: Normal caliber patent    Common femoral: Normal  SFA: Patent  Profunda: Patent    Popliteal: Mild focal stenosis above the knee.  AT: Occluded  TPT: Normal  PT: Focal stenosis in the midportion, otherwise patent.  Peroneal: Normal.  DP: Not seen  Plantar: Patent    ADDITIONAL FINDINGS: Trace left pleural effusion. The liver, spleen, pancreas, and biliary tree are normal. Gallbladder not well seen. Left ureteral stent with mild left hydronephrosis. The kidneys and remainder of the retroperitoneum are normal. No pelvic abnormalities are seen. High-grade distal small bowel obstruction at the level of a left inguinal hernia. Mild mesenteric edema without pneumatosis or free air. Small ascites. There is a transverse colostomy. Abdominal wall subcutaneous emphysema.    IMPRESSION:       Vascular:  *  AORTOILIAC INFLOW: No significant inflow disease  *  RIGHT LEG: Patent femoral-popliteal arteries. 3 vessel runoff to the foot is occluded, anterior tibial proximally, posterior tibial and peroneal arteries distally. Dorsalis pedis is reconstituted but severely attenuated. Plantar not well seen.  *  LEFT LEG: Patent femoral arteries. Mild focal stenosis of the popliteal above knee without occlusion. Below the knee, there is two-vessel runoff via the posterior tibial and peroneal arteries with focal stenosis at the midportion of the posterior tibial artery. Dorsalis pedis not seen. Plantar patent.    OTHER:  *  High-grade distal small bowel obstruction at the level of a left inguinal hernia. Mild mesenteric edema without pneumatosis or free air.  *  Anterior abdominal wall subcutaneous emphysema. Correlate for recent intervention.      --- End of Report ---      MANDO BIRCH MD; Attending Radiologist  This document has been electronically signed. Oct 28 2021  2:22PM                                                                                               General Surgery Consult  Consulting surgical team: B TEAM SURGERY  Consulting attending: Dr. Shade Jones    HPI:  81F hx of Raynaud's, lupus, HTN, known L inguinal hernia planned for repair later this year, rectal cancer s/p LAR and diverting transverse colostomy at [facility] 2 weeks ago c/b post op R cold leg on eliquis here with 1 day of hematemesis and melena through ostomy found to have high grade SBO 2/2 L inguinal hernia. Attempted to reduce at bedside after NGT placement, so far unsuccessful. NGT with dark blood output, GI aware. Vascular surgery no acute plan for R foot at this time given acute GI bleed and SBO, AC when ok with surgery/GI teams. WBC 25, hgb 3 --> s/p PRBC x 3u, FFP x 2u, KCentra --> 11 --> 9, CTA with run off shows high grade SBO 2/2 L inguinal hernia and 3v occlusion to R foot.    PAST MEDICAL HISTORY:  Hypertension    Osteoporosis    Varicose vein of leg    Raynaud disease        PAST SURGICAL HISTORY:  Inguinal hernia        MEDICATIONS:      ALLERGIES:  ACE inhibitors (Angioedema)  amoxicillin (Angioedema)  irbesartan (Angioedema)  Norvasc (Angioedema)      VITALS & I/Os:  Vital Signs Last 24 Hrs  T(C): 36.7 (28 Oct 2021 16:22), Max: 37 (28 Oct 2021 15:25)  T(F): 98 (28 Oct 2021 16:22), Max: 98.6 (28 Oct 2021 15:25)  HR: 96 (28 Oct 2021 16:22) (68 - 106)  BP: 123/70 (28 Oct 2021 16:22) (80/53 - 123/76)  BP(mean): --  RR: 20 (28 Oct 2021 16:22) (15 - 20)  SpO2: 100% (28 Oct 2021 16:22) (97% - 100%)    I&O's Summary      PHYSICAL EXAM:  General: No acute distress, pleasant oriented  Respiratory: Nonlabored  Cardiovascular: appears well perfused  Abdominal: Soft, nondistended, L groin TTP mild. No rebound or guarding. No organomegaly, no palpable mass.  Extremities: Warm    LABS:                        9.4    26.98 )-----------( 268      ( 28 Oct 2021 17:14 )             27.5     10-28    135  |  105  |  24<H>  ----------------------------<  131<H>  5.2   |  16<L>  |  0.68    Ca    7.8<L>      28 Oct 2021 17:14  Phos  2.8     10-28  Mg     1.50     10-28    TPro  5.0<L>  /  Alb  2.6<L>  /  TBili  0.9  /  DBili  x   /  AST  19  /  ALT  17  /  AlkPhos  82  10-28    Lactate:  10-28 @ 17:08  2.4    PT/INR - ( 28 Oct 2021 17:14 )   PT: 12.5 sec;   INR: 1.09 ratio         PTT - ( 28 Oct 2021 17:14 )  PTT:23.7 sec              IMAGING:    EXAM:  CT ANGIO LWR EXT (W)AW IC BI                            PROCEDURE DATE:  10/28/2021          INTERPRETATION:  CLINICAL INFORMATION: Peripheral artery disease of the legs with arterial thrombus    COMPARISON: None.    CONTRAST/COMPLICATIONS:  IV Contrast: Omnipaque 350  93 cc administered   7 cc discarded  Oral Contrast:  Complications: None reported at time of study completion    CT ANGIOGRAM ABDOMEN, PELVIS, AND LOWER EXTREMITIES:    PROCEDURE:  Initially, nonenhanced CT was obtained through the calves. Then, following the rapid administration of intravenous contrast, CT angiography was performed through the abdomen, pelvis, and lower extremities down to the toes.  Delayed images through the calves were also obtained. Sagittal and coronal reformats as well as 3D reconstructions were performed.    FINDINGS:    CENTRAL ARTERIAL SYSTEM:    ABDOMINAL AORTA: Mild atherosclerotic plaque without aneurysm or occlusion.    Celiac: Stenosis at the origin.  SMA: Normal caliber patent.  THOR: Occluded at the origin. Reconstituted by SMA collaterals.  Renal Arteries: Single bilateral. Normal caliber patent.    RIGHT LOWER EXTREMITY:    Common Iliac artery: Normal.  External Iliac: Normal.  Internal Iliac: Normal.    Common femoral: Diffusely attenuated but patent.  SFA: Diffusely attenuated but patent.  Profunda: Diffusely attenuated but patent.    Popliteal: Normal.  Anterior Tibial (AT): Occluded.  Tibioperoneal trunk (TPT): Patent  Posterior Tibial (PT): Occluded distally  Peroneal: Occluded distally and reconstituted  Dorsalis Pedis (DP): Reconstituted. Severely attenuated.  Plantar: Not well seen    LEFT LOWER EXTREMITY:    Common Iliac artery: Normal caliber patent  External Iliac: Normal caliber patent  Internal Iliac: Normal caliber patent    Common femoral: Normal  SFA: Patent  Profunda: Patent    Popliteal: Mild focal stenosis above the knee.  AT: Occluded  TPT: Normal  PT: Focal stenosis in the midportion, otherwise patent.  Peroneal: Normal.  DP: Not seen  Plantar: Patent    ADDITIONAL FINDINGS: Trace left pleural effusion. The liver, spleen, pancreas, and biliary tree are normal. Gallbladder not well seen. Left ureteral stent with mild left hydronephrosis. The kidneys and remainder of the retroperitoneum are normal. No pelvic abnormalities are seen. High-grade distal small bowel obstruction at the level of a left inguinal hernia. Mild mesenteric edema without pneumatosis or free air. Small ascites. There is a transverse colostomy. Abdominal wall subcutaneous emphysema.    IMPRESSION:       Vascular:  *  AORTOILIAC INFLOW: No significant inflow disease  *  RIGHT LEG: Patent femoral-popliteal arteries. 3 vessel runoff to the foot is occluded, anterior tibial proximally, posterior tibial and peroneal arteries distally. Dorsalis pedis is reconstituted but severely attenuated. Plantar not well seen.  *  LEFT LEG: Patent femoral arteries. Mild focal stenosis of the popliteal above knee without occlusion. Below the knee, there is two-vessel runoff via the posterior tibial and peroneal arteries with focal stenosis at the midportion of the posterior tibial artery. Dorsalis pedis not seen. Plantar patent.    OTHER:  *  High-grade distal small bowel obstruction at the level of a left inguinal hernia. Mild mesenteric edema without pneumatosis or free air.  *  Anterior abdominal wall subcutaneous emphysema. Correlate for recent intervention.      --- End of Report ---      MANDO BIRCH MD; Attending Radiologist  This document has been electronically signed. Oct 28 2021  2:22PM                                                                                               General Surgery Consult  Consulting surgical team: B TEAM SURGERY  Consulting attending: Dr. Shade Jones    HPI:  81F hx of Raynaud's, lupus, HTN, known L inguinal hernia planned for repair later this year, rectal cancer s/p LAR and diverting transverse colostomy at [facility] 2 weeks ago c/b post op R cold leg on eliquis here with 1 day of hematemesis and melena through ostomy found to have high grade SBO 2/2 L inguinal hernia. Attempted to reduce at bedside after NGT placement, unsuccessful. NGT with dark blood output, GI aware. Vascular surgery no acute plan for R foot at this time given acute GI bleed and SBO. WBC 25, hgb 3 s/p PRBC x 3u, FFP x 2u, KCentra --> 11 --> 9, CTA with run off shows high grade SBO 2/2 L inguinal hernia and 3v occlusion to R foot.    has known about L inguinal hernia, has reduced it on her own in the past once before  said she was planned for surgery for L IHR after she recovers from her rectal     per daughter, patient was to take 14 days of levaquin upon discharged (10/26-11/9) for UTI found prior to discharge    PAST MEDICAL HISTORY:  Hypertension    Osteoporosis    Varicose vein of leg    Raynaud disease        PAST SURGICAL HISTORY:  Inguinal hernia        MEDICATIONS:      ALLERGIES:  ACE inhibitors (Angioedema)  amoxicillin (Angioedema)  irbesartan (Angioedema)  Norvasc (Angioedema)      VITALS & I/Os:  Vital Signs Last 24 Hrs  T(C): 36.7 (28 Oct 2021 16:22), Max: 37 (28 Oct 2021 15:25)  T(F): 98 (28 Oct 2021 16:22), Max: 98.6 (28 Oct 2021 15:25)  HR: 96 (28 Oct 2021 16:22) (68 - 106)  BP: 123/70 (28 Oct 2021 16:22) (80/53 - 123/76)  BP(mean): --  RR: 20 (28 Oct 2021 16:22) (15 - 20)  SpO2: 100% (28 Oct 2021 16:22) (97% - 100%)    I&O's Summary      PHYSICAL EXAM:  General: No acute distress, pleasant oriented  Respiratory: Nonlabored  Cardiovascular: appears well perfused  Abdominal: Soft, nondistended, L groin TTP mild. No rebound or guarding. No organomegaly, no palpable mass.  Extremities: Warm    LABS:                        9.4    26.98 )-----------( 268      ( 28 Oct 2021 17:14 )             27.5     10-28    135  |  105  |  24<H>  ----------------------------<  131<H>  5.2   |  16<L>  |  0.68    Ca    7.8<L>      28 Oct 2021 17:14  Phos  2.8     10-28  Mg     1.50     10-28    TPro  5.0<L>  /  Alb  2.6<L>  /  TBili  0.9  /  DBili  x   /  AST  19  /  ALT  17  /  AlkPhos  82  10-28    Lactate:  10-28 @ 17:08  2.4    PT/INR - ( 28 Oct 2021 17:14 )   PT: 12.5 sec;   INR: 1.09 ratio         PTT - ( 28 Oct 2021 17:14 )  PTT:23.7 sec                                                                                                           General Surgery Consult  Consulting surgical team: B TEAM SURGERY  Consulting attending: Dr. Shade Jones    HPI:  81F hx of Raynaud's, lupus, HTN, known L inguinal hernia planned for repair later this year, rectal cancer s/p LAR and diverting transverse colostomy at [facility] 2 weeks ago c/b post op R cold leg on eliquis here with 1 day of hematemesis and melena through ostomy found to have high grade SBO 2/2 L inguinal hernia. Attempted to reduce at bedside after NGT placement, unsuccessful. NGT with dark blood output, GI aware. Vascular surgery no acute plan for R foot at this time given acute GI bleed and SBO. WBC 25, hgb 3 s/p PRBC x 3u, FFP x 2u, KCentra --> 11 --> 9, CTA with run off shows high grade SBO 2/2 L inguinal hernia and 3v occlusion to R foot.    has known about L inguinal hernia, has reduced it on her own in the past once before  said she was planned for surgery for L IHR after she recovers from her rectal     per daughter, patient was to take 14 days of levaquin upon discharged (10/26-11/9) for UTI found prior to discharge    med rec confirmed with daughter Katy [phone]    PAST MEDICAL HISTORY:  Hypertension    Osteoporosis    Varicose vein of leg    Raynaud disease        PAST SURGICAL HISTORY:  Inguinal hernia        MEDICATIONS:      ALLERGIES:  ACE inhibitors (Angioedema)  amoxicillin (Angioedema)  irbesartan (Angioedema)  Norvasc (Angioedema)      VITALS & I/Os:  Vital Signs Last 24 Hrs  T(C): 36.7 (28 Oct 2021 16:22), Max: 37 (28 Oct 2021 15:25)  T(F): 98 (28 Oct 2021 16:22), Max: 98.6 (28 Oct 2021 15:25)  HR: 96 (28 Oct 2021 16:22) (68 - 106)  BP: 123/70 (28 Oct 2021 16:22) (80/53 - 123/76)  BP(mean): --  RR: 20 (28 Oct 2021 16:22) (15 - 20)  SpO2: 100% (28 Oct 2021 16:22) (97% - 100%)    I&O's Summary      PHYSICAL EXAM:  General: No acute distress, pleasant oriented  Respiratory: Nonlabored  Cardiovascular: appears well perfused  Abdominal: Soft, nondistended, L groin TTP mild. No rebound or guarding. No organomegaly, no palpable mass.  Extremities: Warm    LABS:                        9.4    26.98 )-----------( 268      ( 28 Oct 2021 17:14 )             27.5     10-28    135  |  105  |  24<H>  ----------------------------<  131<H>  5.2   |  16<L>  |  0.68    Ca    7.8<L>      28 Oct 2021 17:14  Phos  2.8     10-28  Mg     1.50     10-28    TPro  5.0<L>  /  Alb  2.6<L>  /  TBili  0.9  /  DBili  x   /  AST  19  /  ALT  17  /  AlkPhos  82  10-28    Lactate:  10-28 @ 17:08  2.4    PT/INR - ( 28 Oct 2021 17:14 )   PT: 12.5 sec;   INR: 1.09 ratio         PTT - ( 28 Oct 2021 17:14 )  PTT:23.7 sec

## 2021-10-28 NOTE — CONSULT NOTE ADULT - ASSESSMENT
82yo F with PMH of CA rectum s/p recent admission to [facility] 2 weeks ago for resection with colostomy placement, also found to have RLE arterial thrombus and started on Eliquis prior to discharge. Pt presents to ED today with coffee-ground vomiting early this morning. In ED, vital signs stable. Black stool coming from colostomy as well. Found to have Hgb 3.7. ICU consulted for acute blood loss anemia in setting of GIB.    --Hemodynamically stable at time of exam. Noted to have further episode of coffee-ground vomiting in ED. Pt received 2 units PRBC; recommend 3rd unit.  --Discussed case with vascular surgery. Due to inability to anticoagulate in setting of GIB with severe anemia, pt will need urgent vascular intervention to salvage RLE, unavailable at this facility. After 3rd unit PRBC if pt remains hemodynamically stable, recommend transfer to tertiary center for vascular intervention.  -GI consulted by ED; plan for EGD if unable to facilitate transfer.     Seen and discussed with ICU attending Dr. Granger and ED attending.  82yo F with PMH of CA rectum s/p recent admission to [facility] 2 weeks ago for resection with colostomy placement, also found to have RLE arterial thrombus and started on Eliquis prior to discharge. Pt presents to ED today with coffee-ground vomiting early this morning. In ED, vital signs stable. Black stool coming from colostomy as well. Found to have Hgb 3.7. ICU consulted for acute blood loss anemia in setting of GIB.    --Hemodynamically stable at time of exam. Noted to have further episode of coffee-ground vomiting in ED; reocmmend NGT insertion to minimize risk of aspiration. Pt received 2 units PRBC; recommend 3rd unit.  --  --Discussed case with vascular surgery. Due to inability to anticoagulate in setting of GIB with severe anemia, pt will need urgent vascular intervention to salvage RLE, unavailable at this facility. After 3rd unit PRBC if pt remains hemodynamically stable, recommend transfer to tertiary center for vascular intervention.  -GI consulted by ED; plan for EGD if unable to facilitate transfer.     Seen and discussed with ICU attending Dr. Granger and ED attending.

## 2021-10-28 NOTE — ED ADULT NURSE NOTE - OBJECTIVE STATEMENT
received pt in bed 4, 80yo F Aox4, hx of osteoporosis, HTN, raynauds disease, allergies to amoxcillin, ACE inhibitors presents to ED for nausea and dark brown emesis today.  PT had a recent rectal surgery for rectal cancer and also had a colostomy in place.  Pt denies any pain or discomfort at this moment. received pt in bed 4, 82yo F Aox4, hx of osteoporosis, HTN, raynauds disease, allergies to amoxcillin, ACE inhibitors presents to ED as per EMS pt hd syncopal episode, black emesis, and hypotensive (70/40). Had rectum removed 2 weeks ago. ostomy present. pt receiving IM nausea medication post surgery due to ?qtc prolongation, per EMS. 20g IV in left forearm with 1000mL normal saline infused with EMS.   Pt denies any pain or discomfort at this moment. received pt in bed 4, 80yo F Aox4, hx of PAcemarker, osteoporosis, HTN, raynauds disease, allergies to amoxcillin, ACE inhibitors presents to ED as per EMS pt hd syncopal episode, black emesis, and hypotensive (70/40). Had rectum removed 2 weeks ago. ostomy present. pt receiving IM nausea medication post surgery due to ?qtc prolongation, per EMS. 20g IV in left forearm with 1000mL normal saline infused with EMS.   Pt denies any pain or discomfort at this moment.

## 2021-10-28 NOTE — ED ADULT NURSE NOTE - CHIEF COMPLAINT QUOTE
Pt transfer from [facility] for GI bleed and RT leg thrombosis. Pt H&H at [facility] was 3.7. Pt received 3 units PRBCs at [facility] with no complications. Pt arrives with plasma and Protonix infusing. PMH HTN, Pace Maker, Rectal CA. Pt arrives with 20g IV in left forearm and 20g in right arm x2. Pt also arrives with colostomy. Plasma infusion completed while in triage @[id number]

## 2021-10-29 LAB
A1C WITH ESTIMATED AVERAGE GLUCOSE RESULT: 4.9 % — SIGNIFICANT CHANGE UP (ref 4–5.6)
ALBUMIN SERPL ELPH-MCNC: 2.1 G/DL — LOW (ref 3.3–5)
ALP SERPL-CCNC: 71 U/L — SIGNIFICANT CHANGE UP (ref 40–120)
ALT FLD-CCNC: 11 U/L — SIGNIFICANT CHANGE UP (ref 4–33)
ANION GAP SERPL CALC-SCNC: 14 MMOL/L — SIGNIFICANT CHANGE UP (ref 7–14)
APTT BLD: 25.7 SEC — LOW (ref 27–36.3)
AST SERPL-CCNC: 19 U/L — SIGNIFICANT CHANGE UP (ref 4–32)
BILIRUB SERPL-MCNC: 0.6 MG/DL — SIGNIFICANT CHANGE UP (ref 0.2–1.2)
BLOOD GAS ARTERIAL - LYTES,HGB,ICA,LACT RESULT: SIGNIFICANT CHANGE UP
BUN SERPL-MCNC: 25 MG/DL — HIGH (ref 7–23)
CALCIUM SERPL-MCNC: 7.8 MG/DL — LOW (ref 8.4–10.5)
CHLORIDE SERPL-SCNC: 108 MMOL/L — HIGH (ref 98–107)
CO2 SERPL-SCNC: 14 MMOL/L — LOW (ref 22–31)
CREAT SERPL-MCNC: 0.84 MG/DL — SIGNIFICANT CHANGE UP (ref 0.5–1.3)
ESTIMATED AVERAGE GLUCOSE: 94 — SIGNIFICANT CHANGE UP
GLUCOSE BLDC GLUCOMTR-MCNC: 130 MG/DL — HIGH (ref 70–99)
GLUCOSE SERPL-MCNC: 121 MG/DL — HIGH (ref 70–99)
HCT VFR BLD CALC: 26.9 % — LOW (ref 34.5–45)
HCT VFR BLD CALC: 28.1 % — LOW (ref 34.5–45)
HGB BLD-MCNC: 9.5 G/DL — LOW (ref 11.5–15.5)
HGB BLD-MCNC: 9.8 G/DL — LOW (ref 11.5–15.5)
INR BLD: 0.91 RATIO — SIGNIFICANT CHANGE UP (ref 0.88–1.16)
MAGNESIUM SERPL-MCNC: 1.3 MG/DL — LOW (ref 1.6–2.6)
MCHC RBC-ENTMCNC: 30.3 PG — SIGNIFICANT CHANGE UP (ref 27–34)
MCHC RBC-ENTMCNC: 30.4 PG — SIGNIFICANT CHANGE UP (ref 27–34)
MCHC RBC-ENTMCNC: 34.9 GM/DL — SIGNIFICANT CHANGE UP (ref 32–36)
MCHC RBC-ENTMCNC: 35.3 GM/DL — SIGNIFICANT CHANGE UP (ref 32–36)
MCV RBC AUTO: 85.9 FL — SIGNIFICANT CHANGE UP (ref 80–100)
MCV RBC AUTO: 87 FL — SIGNIFICANT CHANGE UP (ref 80–100)
NRBC # BLD: 0 /100 WBCS — SIGNIFICANT CHANGE UP
NRBC # BLD: 0 /100 WBCS — SIGNIFICANT CHANGE UP
NRBC # FLD: 0.04 K/UL — HIGH
NRBC # FLD: 0.05 K/UL — HIGH
PHOSPHATE SERPL-MCNC: 3.5 MG/DL — SIGNIFICANT CHANGE UP (ref 2.5–4.5)
PLATELET # BLD AUTO: 206 K/UL — SIGNIFICANT CHANGE UP (ref 150–400)
PLATELET # BLD AUTO: 217 K/UL — SIGNIFICANT CHANGE UP (ref 150–400)
POTASSIUM SERPL-MCNC: 4.8 MMOL/L — SIGNIFICANT CHANGE UP (ref 3.5–5.3)
POTASSIUM SERPL-SCNC: 4.8 MMOL/L — SIGNIFICANT CHANGE UP (ref 3.5–5.3)
PROT SERPL-MCNC: 3.8 G/DL — LOW (ref 6–8.3)
PROTHROM AB SERPL-ACNC: 10.5 SEC — LOW (ref 10.6–13.6)
RBC # BLD: 3.13 M/UL — LOW (ref 3.8–5.2)
RBC # BLD: 3.23 M/UL — LOW (ref 3.8–5.2)
RBC # FLD: 13.9 % — SIGNIFICANT CHANGE UP (ref 10.3–14.5)
RBC # FLD: 14.3 % — SIGNIFICANT CHANGE UP (ref 10.3–14.5)
SODIUM SERPL-SCNC: 136 MMOL/L — SIGNIFICANT CHANGE UP (ref 135–145)
WBC # BLD: 20.94 K/UL — HIGH (ref 3.8–10.5)
WBC # BLD: 22.64 K/UL — HIGH (ref 3.8–10.5)
WBC # FLD AUTO: 20.94 K/UL — HIGH (ref 3.8–10.5)
WBC # FLD AUTO: 22.64 K/UL — HIGH (ref 3.8–10.5)

## 2021-10-29 PROCEDURE — 71045 X-RAY EXAM CHEST 1 VIEW: CPT | Mod: 26

## 2021-10-29 PROCEDURE — 99232 SBSQ HOSP IP/OBS MODERATE 35: CPT | Mod: GC

## 2021-10-29 PROCEDURE — 93010 ELECTROCARDIOGRAM REPORT: CPT

## 2021-10-29 PROCEDURE — 49507 PRP I/HERN INIT BLOCK >5 YR: CPT | Mod: GC

## 2021-10-29 RX ORDER — MAGNESIUM SULFATE 500 MG/ML
2 VIAL (ML) INJECTION ONCE
Refills: 0 | Status: COMPLETED | OUTPATIENT
Start: 2021-10-29 | End: 2021-10-29

## 2021-10-29 RX ORDER — CHLORHEXIDINE GLUCONATE 213 G/1000ML
1 SOLUTION TOPICAL
Refills: 0 | Status: DISCONTINUED | OUTPATIENT
Start: 2021-10-29 | End: 2021-11-03

## 2021-10-29 RX ORDER — HYDROMORPHONE HYDROCHLORIDE 2 MG/ML
0.25 INJECTION INTRAMUSCULAR; INTRAVENOUS; SUBCUTANEOUS ONCE
Refills: 0 | Status: DISCONTINUED | OUTPATIENT
Start: 2021-10-29 | End: 2021-10-29

## 2021-10-29 RX ORDER — MAGNESIUM SULFATE 500 MG/ML
1 VIAL (ML) INJECTION ONCE
Refills: 0 | Status: COMPLETED | OUTPATIENT
Start: 2021-10-29 | End: 2021-10-29

## 2021-10-29 RX ORDER — ACETAMINOPHEN 500 MG
1000 TABLET ORAL EVERY 6 HOURS
Refills: 0 | Status: DISCONTINUED | OUTPATIENT
Start: 2021-10-29 | End: 2021-10-30

## 2021-10-29 RX ORDER — SODIUM CHLORIDE 9 MG/ML
1000 INJECTION, SOLUTION INTRAVENOUS
Refills: 0 | Status: DISCONTINUED | OUTPATIENT
Start: 2021-10-29 | End: 2021-10-30

## 2021-10-29 RX ORDER — HYDROMORPHONE HYDROCHLORIDE 2 MG/ML
0.5 INJECTION INTRAMUSCULAR; INTRAVENOUS; SUBCUTANEOUS EVERY 4 HOURS
Refills: 0 | Status: DISCONTINUED | OUTPATIENT
Start: 2021-10-29 | End: 2021-10-29

## 2021-10-29 RX ORDER — METOPROLOL TARTRATE 50 MG
5 TABLET ORAL EVERY 6 HOURS
Refills: 0 | Status: DISCONTINUED | OUTPATIENT
Start: 2021-10-29 | End: 2021-10-30

## 2021-10-29 RX ADMIN — Medication 100 GRAM(S): at 10:01

## 2021-10-29 RX ADMIN — SODIUM CHLORIDE 50 MILLILITER(S): 9 INJECTION, SOLUTION INTRAVENOUS at 19:00

## 2021-10-29 RX ADMIN — SODIUM CHLORIDE 50 MILLILITER(S): 9 INJECTION, SOLUTION INTRAVENOUS at 12:15

## 2021-10-29 RX ADMIN — CHLORHEXIDINE GLUCONATE 1 APPLICATION(S): 213 SOLUTION TOPICAL at 06:11

## 2021-10-29 RX ADMIN — PANTOPRAZOLE SODIUM 40 MILLIGRAM(S): 20 TABLET, DELAYED RELEASE ORAL at 05:46

## 2021-10-29 RX ADMIN — Medication 5 MILLIGRAM(S): at 23:55

## 2021-10-29 RX ADMIN — PANTOPRAZOLE SODIUM 40 MILLIGRAM(S): 20 TABLET, DELAYED RELEASE ORAL at 17:25

## 2021-10-29 RX ADMIN — Medication 400 MILLIGRAM(S): at 20:58

## 2021-10-29 RX ADMIN — Medication 50 GRAM(S): at 03:36

## 2021-10-29 RX ADMIN — SODIUM CHLORIDE 100 MILLILITER(S): 9 INJECTION, SOLUTION INTRAVENOUS at 09:10

## 2021-10-29 RX ADMIN — SODIUM CHLORIDE 100 MILLILITER(S): 9 INJECTION, SOLUTION INTRAVENOUS at 03:36

## 2021-10-29 RX ADMIN — Medication 5 MILLIGRAM(S): at 17:25

## 2021-10-29 RX ADMIN — Medication 5 MILLIGRAM(S): at 12:17

## 2021-10-29 RX ADMIN — HYDROMORPHONE HYDROCHLORIDE 0.25 MILLIGRAM(S): 2 INJECTION INTRAMUSCULAR; INTRAVENOUS; SUBCUTANEOUS at 23:59

## 2021-10-29 NOTE — PROGRESS NOTE ADULT - SUBJECTIVE AND OBJECTIVE BOX
Chief Complaint:  Patient is a 81y old  Female who presents with a chief complaint of GI bleed, SBO 2/2 L inguinal hernia (29 Oct 2021 10:06)      Reason for consult:    Interval Events:     Hospital Medications:  acetaminophen   IVPB .. 1000 milliGRAM(s) IV Intermittent every 6 hours PRN  chlorhexidine 4% Liquid 1 Application(s) Topical <User Schedule>  dextrose 5% + sodium chloride 0.45%. 1000 milliLiter(s) IV Continuous <Continuous>  metoprolol tartrate Injectable 5 milliGRAM(s) IV Push every 6 hours  pantoprazole  Injectable 40 milliGRAM(s) IV Push every 12 hours      ROS:   General:  No  fevers, chills, night sweats, fatigue  Eyes:  Good vision, no reported pain  ENT:  No sore throat, pain, runny nose  CV:  No pain, palpitations  Pulm:  No dyspnea, cough  GI:  See HPI, otherwise negative  :  No  incontinence, nocturia  Muscle:  No pain, weakness  Neuro:  No memory problems  Psych:  No insomnia, mood problems, depression  Endocrine:  No polyuria, polydipsia, cold/heat intolerance  Heme:  No petechiae, ecchymosis, easy bruisability  Skin:  No rash    PHYSICAL EXAM:   Vital Signs:  Vital Signs Last 24 Hrs  T(C): 36.3 (29 Oct 2021 08:00), Max: 37 (28 Oct 2021 15:25)  T(F): 97.4 (29 Oct 2021 08:00), Max: 98.6 (28 Oct 2021 15:25)  HR: 98 (29 Oct 2021 12:00) (82 - 112)  BP: 167/87 (29 Oct 2021 12:00) (79/56 - 167/87)  BP(mean): 106 (29 Oct 2021 12:00) (68 - 106)  RR: 18 (29 Oct 2021 12:00) (11 - 22)  SpO2: 100% (29 Oct 2021 12:00) (97% - 100%)  Daily Height in cm: 157.48 (28 Oct 2021 15:25)    Daily Weight in k.5 (29 Oct 2021 01:30)    GENERAL: no acute distress  NEURO: alert  HEENT: anicteric sclera, no conjunctival pallor appreciated  CHEST: no respiratory distress, no accessory muscle use  CARDIAC: regular rate, rhythm  ABDOMEN: soft, non-tender, non-distended, no rebound or guarding  EXTREMITIES: warm, well perfused, no edema  SKIN: no lesions noted    LABS: reviewed                        9.8    20.94 )-----------( 206      ( 29 Oct 2021 08:45 )             28.1     10-29    136  |  108<H>  |  25<H>  ----------------------------<  121<H>  4.8   |  14<L>  |  0.84    Ca    7.8<L>      29 Oct 2021 01:42  Phos  3.5     10-29  Mg     1.30     10-29    TPro  3.8<L>  /  Alb  2.1<L>  /  TBili  0.6  /  DBili  x   /  AST  19  /  ALT  11  /  AlkPhos  71  10-29    LIVER FUNCTIONS - ( 29 Oct 2021 01:42 )  Alb: 2.1 g/dL / Pro: 3.8 g/dL / ALK PHOS: 71 U/L / ALT: 11 U/L / AST: 19 U/L / GGT: x             Interval Diagnostic Studies: see sunrise for full report   Chief Complaint:  Patient is a 81y old  Female who presents with a chief complaint of GI bleed, SBO 2/2 L inguinal hernia (29 Oct 2021 10:06)      Reason for consult: melena    Interval Events:   Afebrile, HDS  s/p EGD with oozing duodenal ulcer s/p epi/bicap  s/p Ex lap repair of incarcerated left inguinal hernia with mesh,    Hospital Medications:  acetaminophen   IVPB .. 1000 milliGRAM(s) IV Intermittent every 6 hours PRN  chlorhexidine 4% Liquid 1 Application(s) Topical <User Schedule>  dextrose 5% + sodium chloride 0.45%. 1000 milliLiter(s) IV Continuous <Continuous>  metoprolol tartrate Injectable 5 milliGRAM(s) IV Push every 6 hours  pantoprazole  Injectable 40 milliGRAM(s) IV Push every 12 hours      ROS:   Complete and normal except as mentioned above.    PHYSICAL EXAM:   Vital Signs:  Vital Signs Last 24 Hrs  T(C): 36.3 (29 Oct 2021 08:00), Max: 37 (28 Oct 2021 15:25)  T(F): 97.4 (29 Oct 2021 08:00), Max: 98.6 (28 Oct 2021 15:25)  HR: 98 (29 Oct 2021 12:00) (82 - 112)  BP: 167/87 (29 Oct 2021 12:00) (79/56 - 167/87)  BP(mean): 106 (29 Oct 2021 12:00) (68 - 106)  RR: 18 (29 Oct 2021 12:00) (11 - 22)  SpO2: 100% (29 Oct 2021 12:00) (97% - 100%)  Daily Height in cm: 157.48 (28 Oct 2021 15:25)    Daily Weight in k.5 (29 Oct 2021 01:30)    GENERAL: no acute distress  NEURO: alert  HEENT: anicteric sclera, no conjunctival pallor appreciated  CHEST: no respiratory distress, no accessory muscle use  CARDIAC: regular rate, rhythm  ABDOMEN: soft, tender l inguinal area, mildly distended, non tender abdomen, no rebound or guarding, black liquid stool in colostomy bag  EXTREMITIES: warm, well perfused, no edema  SKIN: no lesions noted    LABS: reviewed                        9.8    20.94 )-----------( 206      ( 29 Oct 2021 08:45 )             28.1     10-29    136  |  108<H>  |  25<H>  ----------------------------<  121<H>  4.8   |  14<L>  |  0.84    Ca    7.8<L>      29 Oct 2021 01:42  Phos  3.5     10-29  Mg     1.30     10-29    TPro  3.8<L>  /  Alb  2.1<L>  /  TBili  0.6  /  DBili  x   /  AST  19  /  ALT  11  /  AlkPhos  71  10-29    LIVER FUNCTIONS - ( 29 Oct 2021 01:42 )  Alb: 2.1 g/dL / Pro: 3.8 g/dL / ALK PHOS: 71 U/L / ALT: 11 U/L / AST: 19 U/L / GGT: x             Interval Diagnostic Studies: see sunrise for full report

## 2021-10-29 NOTE — BRIEF OPERATIVE NOTE - OPERATION/FINDINGS
GI procedure first - EGD and identification of bleeding duodenal ulcer that was coagulated and injected with epinephrine. no bleeding at end of case.     Surgery - incarcerated L direct inguinal hernia. hernia reduced and repaired with plug/mesh. bowel was viable. hemostasis at end of case.

## 2021-10-29 NOTE — CONSULT NOTE ADULT - SUBJECTIVE AND OBJECTIVE BOX
SICU Consultation Note  =====================================================  HPI: 81y Female  HPI:  81F with PMH of HTN, osteoporosis, Raynaud disease, varicose veins, rectal CA s/p LAR and diverting transverse colostomy c/b Right cold leg on Eliquis, and pacemaker here with 1 day of hematemesis and melena now found to have a high grade SBO 2/2 to L inguinal hernia. NGT with dark blood output. Hemoglobin was 3, s/p pRBC x 3 units in the ED, FFP x 2 units and KCentra.     OR: EGD by GI, showing duodenal ulcer that was bleeding s/p coagulation and injected with epinephrine. Incarcerated left direct inguinal hernia which was reduced and repaired with plug/mesh, bowel viable.    2250cc LR  10cc EBL  1u pRBC  50cc UOP      Consulting surgical team: B TE[facility] SURG[facility]  Consulting attending: Dr. Shade Jones      PAST MEDICAL HISTORY:  Hypertension    Osteoporosis    Varicose vein of leg    Raynaud disease        PAST SURGICAL HISTORY:  Inguinal hernia    ROS:    REVIEW OF SYSTEMS    [X ] A ten-point review of systems was otherwise negative except as noted.  [ ] Due to altered mental status/intubation, subjective information were not able to be obtained from the patient. History was obtained, to the extent possible, from review of the chart and collateral sources of information.      CURRENT MEDICATIONS:   --------------------------------------------------------------------------------------  Neurologic Medications  HYDROmorphone  Injectable 0.5 milliGRAM(s) IV Push every 4 hours PRN Moderate Pain (4 - 6)    Respiratory Medications    Cardiovascular Medications    Gastrointestinal Medications  lactated ringers. 1000 milliLiter(s) IV Continuous <Continuous>  pantoprazole  Injectable 40 milliGRAM(s) IV Push every 12 hours    Genitourinary Medications    Hematologic/Oncologic Medications    Antimicrobial/Immunologic Medications    Endocrine/Metabolic Medications    Topical/Other Medications  chlorhexidine 4% Liquid 1 Application(s) Topical <User Schedule>    --------------------------------------------------------------------------------------    VITAL SIGNS, INS/OUTS (last 24 hours):  --------------------------------------------------------------------------------------  ICU Vital Signs Last 24 Hrs  T(C): 36.7 (28 Oct 2021 20:46), Max: 37 (28 Oct 2021 15:25)  T(F): 98 (28 Oct 2021 20:46), Max: 98.6 (28 Oct 2021 15:25)  HR: 112 (28 Oct 2021 20:46) (68 - 112)  BP: 89/56 (28 Oct 2021 20:46) (79/56 - 123/76)  BP(mean): --  ABP: --  ABP(mean): --  RR: 20 (28 Oct 2021 20:46) (15 - 22)  SpO2: 99% (28 Oct 2021 20:46) (97% - 100%)    I&O's Summary    --------------------------------------------------------------------------------------    EXAM:  General/Neuro  RASS:   GCS:   Exam: NAD, AAOx3    Respiratory  Exam: CTA b/l  [] Tracheostomy   [] Intubated  Mechanical Ventilation:     Cardiovascular  Exam: RRR  Cardiac Rhythm: Normal Sinus Rhythm  ECHO:     GI  Exam: +ostomy, left lower abdominal incision, c/d/i  Wound:   ***  Current Diet:  NPO***      Tubes/Lines/Drains  ***  [x] Peripheral IV  [X] Central Venous Line     	[X] R	[] L	[X] IJ	[] Fem	[] SC        Type:	    Date Placed: 10/29  [X] Arterial Line		[] R	[X] L	[] Fem	[X] Rad	[] Ax	Date Placed: 10/29  [] PICC:         	[] Midline		[] Mediport           [X] Urinary Catheter		Date Placed: 10/29    Extremities  Exam: Extremities warm, pink, well-perfused.        Derm:  Exam: Good skin turgor, no skin breakdown.      :   Exam: Young catheter in place.     LABS  --------------------------------------------------------------------------------------  Labs:  CAPILLARY BLOOD GLUCOSE                              9.4    26.98 )-----------( 268      ( 28 Oct 2021 17:14 )             27.5       Auto Neutrophil %: 93.8 % (10-28-21 @ 17:14)  Band Neutrophils %: 5.3 % (10-28-21 @ 17:14)  Auto Neutrophil %: 92.0 % (10-28-21 @ 07:41)  Band Neutrophils %: 3.0 % (10-28-21 @ 07:41)    10-28    135  |  105  |  24<H>  ----------------------------<  131<H>  5.2   |  16<L>  |  0.68      Calcium, Total Serum: 7.8 mg/dL (10-28-21 @ 17:14)      LFTs:             5.0  | 0.9  | 19       ------------------[82      ( 28 Oct 2021 17:14 )  2.6  | x    | 17          Lipase:x      Amylase:x         Blood Gas Arterial, Lactate: 1.1 mmol/L (10-29-21 @ 00:09)  Blood Gas Arterial, Lactate: 1.8 mmol/L (10-28-21 @ 23:00)  Blood Gas Arterial, Lactate: 2.1 mmol/L (10-28-21 @ 21:51)  Blood Gas Venous - Lactate: 2.4 mmol/L (10-28-21 @ 17:08)    ABG - ( 29 Oct 2021 00:09 )  pH: 7.33  /  pCO2: 32    /  pO2: 214   / HCO3: 17    / Base Excess: -8.2  /  SaO2: 98.9            ABG - ( 28 Oct 2021 23:00 )  pH: 7.21  /  pCO2: 44    /  pO2: 171   / HCO3: 18    / Base Excess: -9.7  /  SaO2: 98.8            ABG - ( 28 Oct 2021 21:51 )  pH: 7.38  /  pCO2: 28    /  pO2: 94    / HCO3: 17    / Base Excess: -7.6  /  SaO2: 99.1              Coags:     12.5   ----< 1.09    ( 28 Oct 2021 17:14 )     23.7        OTHER LABS    IMAGING RESULTS  < from: CT Angio Lower Extremity w/ IV Cont, Bilateral (10.28.21 @ 11:09) >  IMPRESSION:       Vascular:  *  AORTOILIAC INFLOW: No significant inflow disease  *  RIGHT LEG: Patent femoral-popliteal arteries. 3 vessel runoff to the foot is occluded, anterior tibial proximally, posterior tibial and peroneal arteries distally. Dorsalis pedis is reconstituted but severely attenuated. Plantar not well seen.  *  LEFT LEG: Patent femoral arteries. Mild focal stenosis of the popliteal above knee without occlusion. Below the knee, there is two-vessel runoff via theposterior tibial and peroneal arteries with focal stenosis at the midportion of the posterior tibial artery. Dorsalis pedis not seen. Plantar patent.    OTHER:  *  High-grade distal small bowel obstruction at the level of a left inguinal hernia. Mildmesenteric edema without pneumatosis or free air.  *  Anterior abdominal wall subcutaneous emphysema. Correlate for recent intervention.    < end of copied text >           SICU Consultation Note  =====================================================  HPI: 81y Female  HPI:  81F with PMH of HTN, osteoporosis, Raynaud disease, varicose veins, rectal CA s/p LAR and diverting transverse colostomy c/b Right cold leg on Eliquis, and pacemaker here with 1 day of hematemesis and melena now found to have a high grade SBO 2/2 to L inguinal hernia. NGT with dark blood output. Hemoglobin was 3, s/p pRBC x 3 units in the ED, FFP x 2 units and KCentra.     OR: EGD by GI, showing duodenal ulcer that was bleeding s/p coagulation and injected with epinephrine. Incarcerated left direct inguinal hernia which was reduced and repaired with plug/mesh, bowel viable.    2250cc LR  10cc EBL  1u pRBC  50cc UOP    Interval events:  - admitted to SICU for acute blood anemia  - 3units pRBC given in the ED, 1u pRBC given in the OR, and 1u pRBC in the SICU        PAST MEDICAL HISTORY:  Hypertension    Osteoporosis    Varicose vein of leg    Raynaud disease        PAST SURGICAL HISTORY:  Inguinal hernia    ROS:    REVIEW OF SYSTEMS    [X ] A ten-point review of systems was otherwise negative except as noted.  [ ] Due to altered mental status/intubation, subjective information were not able to be obtained from the patient. History was obtained, to the extent possible, from review of the chart and collateral sources of information.      CURRENT MEDICATIONS:   --------------------------------------------------------------------------------------  Neurologic Medications  HYDROmorphone  Injectable 0.5 milliGRAM(s) IV Push every 4 hours PRN Moderate Pain (4 - 6)    Respiratory Medications    Cardiovascular Medications    Gastrointestinal Medications  lactated ringers. 1000 milliLiter(s) IV Continuous <Continuous>  pantoprazole  Injectable 40 milliGRAM(s) IV Push every 12 hours    Genitourinary Medications    Hematologic/Oncologic Medications    Antimicrobial/Immunologic Medications    Endocrine/Metabolic Medications    Topical/Other Medications  chlorhexidine 4% Liquid 1 Application(s) Topical <User Schedule>    --------------------------------------------------------------------------------------    VITAL SIGNS, INS/OUTS (last 24 hours):  --------------------------------------------------------------------------------------  ICU Vital Signs Last 24 Hrs  T(C): 36.7 (28 Oct 2021 20:46), Max: 37 (28 Oct 2021 15:25)  T(F): 98 (28 Oct 2021 20:46), Max: 98.6 (28 Oct 2021 15:25)  HR: 112 (28 Oct 2021 20:46) (68 - 112)  BP: 89/56 (28 Oct 2021 20:46) (79/56 - 123/76)  BP(mean): --  ABP: --  ABP(mean): --  RR: 20 (28 Oct 2021 20:46) (15 - 22)  SpO2: 99% (28 Oct 2021 20:46) (97% - 100%)    I&O's Summary    --------------------------------------------------------------------------------------    EXAM:  General/Neuro  RASS:   GCS:   Exam: NAD, AAOx3    Respiratory  Exam: CTA b/l  [] Tracheostomy   [] Intubated  Mechanical Ventilation:     Cardiovascular  Exam: RRR  Cardiac Rhythm: Normal Sinus Rhythm  ECHO:     GI  Exam: +ostomy, left lower abdominal incision, c/d/i  Wound:   ***  Current Diet:  NPO***      Tubes/Lines/Drains  ***  [x] Peripheral IV  [X] Central Venous Line     	[X] R	[] L	[X] IJ	[] Fem	[] SC        Type:	    Date Placed: 10/29  [X] Arterial Line		[] R	[X] L	[] Fem	[X] Rad	[] Ax	Date Placed: 10/29  [] PICC:         	[] Midline		[] Mediport           [X] Urinary Catheter		Date Placed: 10/29    Extremities  Exam: Extremities warm, pink, well-perfused.        Derm:  Exam: Good skin turgor, no skin breakdown.      :   Exam: Young catheter in place.     LABS  --------------------------------------------------------------------------------------  Labs:  CAPILLARY BLOOD GLUCOSE                              9.4    26.98 )-----------( 268      ( 28 Oct 2021 17:14 )             27.5       Auto Neutrophil %: 93.8 % (10-28-21 @ 17:14)  Band Neutrophils %: 5.3 % (10-28-21 @ 17:14)  Auto Neutrophil %: 92.0 % (10-28-21 @ 07:41)  Band Neutrophils %: 3.0 % (10-28-21 @ 07:41)    10-28    135  |  105  |  24<H>  ----------------------------<  131<H>  5.2   |  16<L>  |  0.68      Calcium, Total Serum: 7.8 mg/dL (10-28-21 @ 17:14)      LFTs:             5.0  | 0.9  | 19       ------------------[82      ( 28 Oct 2021 17:14 )  2.6  | x    | 17          Lipase:x      Amylase:x         Blood Gas Arterial, Lactate: 1.1 mmol/L (10-29-21 @ 00:09)  Blood Gas Arterial, Lactate: 1.8 mmol/L (10-28-21 @ 23:00)  Blood Gas Arterial, Lactate: 2.1 mmol/L (10-28-21 @ 21:51)  Blood Gas Venous - Lactate: 2.4 mmol/L (10-28-21 @ 17:08)    ABG - ( 29 Oct 2021 00:09 )  pH: 7.33  /  pCO2: 32    /  pO2: 214   / HCO3: 17    / Base Excess: -8.2  /  SaO2: 98.9            ABG - ( 28 Oct 2021 23:00 )  pH: 7.21  /  pCO2: 44    /  pO2: 171   / HCO3: 18    / Base Excess: -9.7  /  SaO2: 98.8            ABG - ( 28 Oct 2021 21:51 )  pH: 7.38  /  pCO2: 28    /  pO2: 94    / HCO3: 17    / Base Excess: -7.6  /  SaO2: 99.1              Coags:     12.5   ----< 1.09    ( 28 Oct 2021 17:14 )     23.7        OTHER LABS    IMAGING RESULTS  < from: CT Angio Lower Extremity w/ IV Cont, Bilateral (10.28.21 @ 11:09) >  IMPRESSION:       Vascular:  *  AORTOILIAC INFLOW: No significant inflow disease  *  RIGHT LEG: Patent femoral-popliteal arteries. 3 vessel runoff to the foot is occluded, anterior tibial proximally, posterior tibial and peroneal arteries distally. Dorsalis pedis is reconstituted but severely attenuated. Plantar not well seen.  *  LEFT LEG: Patent femoral arteries. Mild focal stenosis of the popliteal above knee without occlusion. Below the knee, there is two-vessel runoff via theposterior tibial and peroneal arteries with focal stenosis at the midportion of the posterior tibial artery. Dorsalis pedis not seen. Plantar patent.    OTHER:  *  High-grade distal small bowel obstruction at the level of a left inguinal hernia. Mildmesenteric edema without pneumatosis or free air.  *  Anterior abdominal wall subcutaneous emphysema. Correlate for recent intervention.    < end of copied text >           SICU Consultation Note  =====================================================  HPI: 81y Female  HPI:  81F with PMH of HTN, osteoporosis, Raynaud disease, varicose veins, rectal CA s/p LAR and diverting transverse colostomy c/b Right cold leg on Eliquis, and pacemaker here with 1 day of hematemesis and melena now found to have a high grade SBO 2/2 to L inguinal hernia. NGT with dark blood output. Hemoglobin was 3, s/p pRBC x 3 units in the ED, FFP x 2 units and KCentra. In the OR, EGD by GI, showing duodenal ulcer that was bleeding s/p coagulation and injected with epinephrine. Incarcerated left direct inguinal hernia which was reduced and repaired with plug/mesh, bowel viable. During case, 2250cc LR; 10cc EBL; 1u pRBC; 50cc UOP    ONE:  - admitted to SICU for acute blood anemia  - additional 1u pRBC in the SICU    day events:  - DC'd roberto, central line, A line.   - primary team contacted about NGT  - Hpylori sent  - fluids changed to D5 1/2 NS at 50cc/hr  - cbc changed to q12h  - PT consult ordered  - EKG obtained  - vascular surgery contacted, no plan for surgical intervention on RLE, plan to start AC as per recs from GI  - GI contacted about anticoagulation plan, recs pending      PAST MEDICAL HISTORY:  Hypertension    Osteoporosis    Varicose vein of leg    Raynaud disease        PAST SURGICAL HISTORY:  Inguinal hernia    ROS:    REVIEW OF SYSTEMS    [X ] A ten-point review of systems was otherwise negative except as noted.  [ ] Due to altered mental status/intubation, subjective information were not able to be obtained from the patient. History was obtained, to the extent possible, from review of the chart and collateral sources of information.      CURRENT MEDICATIONS:   --------------------------------------------------------------------------------------  Neurologic Medications  HYDROmorphone  Injectable 0.5 milliGRAM(s) IV Push every 4 hours PRN Moderate Pain (4 - 6)    Respiratory Medications    Cardiovascular Medications    Gastrointestinal Medications  lactated ringers. 1000 milliLiter(s) IV Continuous <Continuous>  pantoprazole  Injectable 40 milliGRAM(s) IV Push every 12 hours    Genitourinary Medications    Hematologic/Oncologic Medications    Antimicrobial/Immunologic Medications    Endocrine/Metabolic Medications    Topical/Other Medications  chlorhexidine 4% Liquid 1 Application(s) Topical <User Schedule>    --------------------------------------------------------------------------------------    VITAL SIGNS, INS/OUTS (last 24 hours):  --------------------------------------------------------------------------------------  ICU Vital Signs Last 24 Hrs  T(C): 36.7 (28 Oct 2021 20:46), Max: 37 (28 Oct 2021 15:25)  T(F): 98 (28 Oct 2021 20:46), Max: 98.6 (28 Oct 2021 15:25)  HR: 112 (28 Oct 2021 20:46) (68 - 112)  BP: 89/56 (28 Oct 2021 20:46) (79/56 - 123/76)  BP(mean): --  ABP: --  ABP(mean): --  RR: 20 (28 Oct 2021 20:46) (15 - 22)  SpO2: 99% (28 Oct 2021 20:46) (97% - 100%)    I&O's Summary    --------------------------------------------------------------------------------------    EXAM:    General/Neuro  Exam: NAD, AAOx3    Respiratory  Exam: no accessory muscle use, no increased WOB    Cardiovascular  Exam: RRR  Cardiac Rhythm: Normal Sinus Rhythm    GI  Exam: +ostomy with melena, left lower abdominal incision, c/d/i  Current Diet:  NPO    Tubes/Lines/Drain   [x] Peripheral IV    Extremities  Exam: LLE with palpable pulses - femoral popliteal, DP/DC; RLE with doppler pulses to femoral, popliteal.     Derm:  Exam: Good skin turgor, no skin breakdown.      LABS  --------------------------------------------------------------------------------------  Labs:  CAPILLARY BLOOD GLUCOSE                              9.4    26.98 )-----------( 268      ( 28 Oct 2021 17:14 )             27.5       Auto Neutrophil %: 93.8 % (10-28-21 @ 17:14)  Band Neutrophils %: 5.3 % (10-28-21 @ 17:14)  Auto Neutrophil %: 92.0 % (10-28-21 @ 07:41)  Band Neutrophils %: 3.0 % (10-28-21 @ 07:41)    10-28    135  |  105  |  24<H>  ----------------------------<  131<H>  5.2   |  16<L>  |  0.68      Calcium, Total Serum: 7.8 mg/dL (10-28-21 @ 17:14)      LFTs:             5.0  | 0.9  | 19       ------------------[82      ( 28 Oct 2021 17:14 )  2.6  | x    | 17          Lipase:x      Amylase:x         Blood Gas Arterial, Lactate: 1.1 mmol/L (10-29-21 @ 00:09)  Blood Gas Arterial, Lactate: 1.8 mmol/L (10-28-21 @ 23:00)  Blood Gas Arterial, Lactate: 2.1 mmol/L (10-28-21 @ 21:51)  Blood Gas Venous - Lactate: 2.4 mmol/L (10-28-21 @ 17:08)    ABG - ( 29 Oct 2021 00:09 )  pH: 7.33  /  pCO2: 32    /  pO2: 214   / HCO3: 17    / Base Excess: -8.2  /  SaO2: 98.9            ABG - ( 28 Oct 2021 23:00 )  pH: 7.21  /  pCO2: 44    /  pO2: 171   / HCO3: 18    / Base Excess: -9.7  /  SaO2: 98.8            ABG - ( 28 Oct 2021 21:51 )  pH: 7.38  /  pCO2: 28    /  pO2: 94    / HCO3: 17    / Base Excess: -7.6  /  SaO2: 99.1              Coags:     12.5   ----< 1.09    ( 28 Oct 2021 17:14 )     23.7        OTHER LABS    IMAGING RESULTS  < from: CT Angio Lower Extremity w/ IV Cont, Bilateral (10.28.21 @ 11:09) >  IMPRESSION:       Vascular:  *  AORTOILIAC INFLOW: No significant inflow disease  *  RIGHT LEG: Patent femoral-popliteal arteries. 3 vessel runoff to the foot is occluded, anterior tibial proximally, posterior tibial and peroneal arteries distally. Dorsalis pedis is reconstituted but severely attenuated. Plantar not well seen.  *  LEFT LEG: Patent femoral arteries. Mild focal stenosis of the popliteal above knee without occlusion. Below the knee, there is two-vessel runoff via theposterior tibial and peroneal arteries with focal stenosis at the midportion of the posterior tibial artery. Dorsalis pedis not seen. Plantar patent.    OTHER:  *  High-grade distal small bowel obstruction at the level of a left inguinal hernia. Mildmesenteric edema without pneumatosis or free air.  *  Anterior abdominal wall subcutaneous emphysema. Correlate for recent intervention.    < end of copied text >

## 2021-10-29 NOTE — PROGRESS NOTE ADULT - ASSESSMENT
80 y/o F Raynaud's recent LAR with diverting ostomy for rectal cancer c/b post op R cold leg started on eliquis now here with acute GI bleed and SBO 2/2 L inguinal hernia. Vascular surgery consulted for R cold foot.    Recommendations:  -Patient will need AC but plan pending discussion with gastroenterology team regarding when AC will be safe to resume    C TEAM SURGERY  [id number]

## 2021-10-29 NOTE — PROGRESS NOTE ADULT - SUBJECTIVE AND OBJECTIVE BOX
Surgery Progress Note    SUBJECTIVE: Status post laparotomy, repair of incarcerated left inguinal hernia with mesh, EGD by GI and cauterization/epi injection to bleeding duodenal ulcer. Extubated in SICU, Recovering well.     Vital Signs Last 24 Hrs  T(C): 35.6 (29 Oct 2021 02:00), Max: 37 (28 Oct 2021 15:25)  T(F): 96 (29 Oct 2021 02:00), Max: 98.6 (28 Oct 2021 15:25)  HR: 87 (29 Oct 2021 02:00) (68 - 112)  BP: 89/56 (28 Oct 2021 20:46) (79/56 - 123/76)  BP(mean): --  RR: 17 (29 Oct 2021 02:00) (15 - 22)  SpO2: 100% (29 Oct 2021 02:00) (97% - 100%)    Physical Exam:  General Appearance: Appears well, NAD  Respiratory: No labored breathing  CV: Pulse regularly present  Abdomen: Soft, nontender, laparotomy incision c/d/i    LABS:                        9.5    22.64 )-----------( 217      ( 29 Oct 2021 01:42 )             26.9     10-29    136  |  108<H>  |  x   ----------------------------<  x   4.8   |  x   |  x     Ca    7.8<L>      28 Oct 2021 17:14  Phos  2.8     10-28  Mg     1.30     10-29    TPro  5.0<L>  /  Alb  2.6<L>  /  TBili  0.9  /  DBili  x   /  AST  19  /  ALT  17  /  AlkPhos  82  10-28    PT/INR - ( 28 Oct 2021 17:14 )   PT: 12.5 sec;   INR: 1.09 ratio         PTT - ( 28 Oct 2021 17:14 )  PTT:23.7 sec      INs and OUTs:    10-28-21 @ 07:01  -  10-29-21 @ 02:52  --------------------------------------------------------  IN: 100 mL / OUT: 155 mL / NET: -55 mL     Surgery Progress Note / Post-Operative Check    SUBJECTIVE: Status post laparotomy, repair of incarcerated left inguinal hernia with mesh, EGD by GI and cauterization/epi injection to bleeding duodenal ulcer. Extubated in SICU, Recovering well.  Patient complains only of right foot pain. Ostomy with melanotic, dark output.      Vital Signs Last 24 Hrs  T(C): 36.3 (10-29-21 @ 08:00), Max: 37 (10-28-21 @ 15:25)  HR: 85 (10-29-21 @ 09:00) (74 - 112)  BP: 121/60 (10-29-21 @ 08:00) (79/56 - 130/57)  ABP: 117/56 (10-29-21 @ 09:00) (113/63 - 169/77)  ABP(mean): 75 (10-29-21 @ 09:00) (75 - 154)  RR: 11 (10-29-21 @ 09:00) (11 - 22)  SpO2: 100% (10-29-21 @ 09:00) (97% - 100%)      10-28 @ 07:01  -  10-29 @ 07:00  --------------------------------------------------------  IN:    IV PiggyBack: 50 mL    Lactated Ringers: 600 mL  Total IN: 650 mL    OUT:    Gastrostomy Tube (mL): 220 mL    Indwelling Catheter - Urethral (mL): 490 mL  Total OUT: 710 mL    Total NET: -60 mL      10-29 @ 07:01  -  10-29 @ 09:25  --------------------------------------------------------  IN:    Lactated Ringers: 200 mL  Total IN: 200 mL    OUT:    Indwelling Catheter - Urethral (mL): 146 mL  Total OUT: 146 mL    Total NET: 54 mL        Physical Exam:  General Appearance: Appears well, NAD  Respiratory: No labored breathing  CV: Pulse regularly present  Abdomen: Soft, nontender, laparotomy incision c/d/i  ostomy: melena  NGT: dark output    LABS:  CBC (10-29 @ 08:45)                              9.8<L>                         20.94<H>  )----------------(  206        --    % Neutrophils, --    % Lymphocytes, ANC: --                                  28.1<L>              CBC (10-29 @ 01:42)                              9.5<L>                         22.64<H>  )----------------(  217        --    % Neutrophils, --    % Lymphocytes, ANC: --                                  26.9<L>                BMP (10-29 @ 01:42)             136     |  108<H>  |  25<H> 		Ca++ --      Ca 7.8<L>             ---------------------------------( 121<H>		Mg 1.30<L>             4.8     |  14<L>   |  0.84  			Ph 3.5     BMP (10-28 @ 17:14)             135     |  105     |  24<H> 		Ca++ --      Ca 7.8<L>             ---------------------------------( 131<H>		Mg 1.50<L>             5.2     |  16<L>   |  0.68  			Ph 2.8       LFTs (10-29 @ 01:42)      TPro 3.8<L> / Alb 2.1<L> / TBili 0.6 / DBili -- / AST 19 / ALT 11 / AlkPhos 71  LFTs (10-28 @ 17:14)      TPro 5.0<L> / Alb 2.6<L> / TBili 0.9 / DBili -- / AST 19 / ALT 17 / AlkPhos 82    Coags (10-29 @ 08:45)  aPTT 25.7<L> / INR 0.91 / PT 10.5<L>  Coags (10-28 @ 17:14)  aPTT 23.7<L> / INR 1.09 / PT 12.5    ABG (10-29 @ 00:09)     7.33<L> / 32 / 214<H> / 17<L> / -8.2<L> / 98.9<H>%     Lactate:     ABG (10-28 @ 23:00)     7.21<L> / 44<H> / 171<H> / 18<L> / -9.7<L> / 98.8<H>%     Lactate:       VBG (10-28 @ 17:08)     7.30<L> / 37<L> / 25 / 18<L> / -7.6<L> / 52.6%

## 2021-10-29 NOTE — CONSULT NOTE ADULT - ASSESSMENT
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     PLAN:   #Neurologic:   - awake and alert  - pain control with tylenol and dilaudid PRN    #Respiratory:   - breathing comfortably on RA  - incentive spirometry    #Cardiovascular:   - hemodynamically stable, no need for pressors  - keep MAP >65  - pt takes Metoprolol 12.5mg and atorvastatin BID at home  - resume home meds when patient able to take PO    # Gastrointestinal/Nutrition:   - NPO with NGT placed in the OR  - monitor ostomy output, may still continue to have some old blood in ostomy     #Renal/Genitourinary:   - indwelling urinary catheter  - maintenance fluids when patient NPO  - monitor I's and O's  - replete lytes as necessary    #Hematologic:   - s/p 3u pRBC prior to entering SICU  - s/p 1u pRBC in SICU  - monitor CBC q6 hours    #Infectious Disease:   - afebrile  - monitor off antibiotics    Lines/Tubes  - RIJ central line  - Left arterial line  - PIV  - roberto  - ostomy    #Endocrine:   - no hx of DM  - FS q6 hours when NPO    #Disposition:   - SICU    --------------------------------------------------------------------------------------    Critical Care Diagnoses:  - Acute blood loss anemia ASSESSMENT:  81y Female with PMH of HTN, Raynaud's disease, rectal CA s/p LAR and diverting ileostomy, pacemaker, right cold leg on Eliquis, presenting with melena, found to have SBO from left inguinal hernia, now s/p EGD (Found to have bleeding duodenal ulcer) s/p hemostasis and reduction of SBO with mesh placement on 10/29.     PLAN:   #Neurologic:   - awake and alert  - pain control with tylenol and dilaudid PRN    #Respiratory:   - breathing comfortably on RA  - incentive spirometry  - maintain SpO2 >92%    #Cardiovascular:   - hemodynamically stable, no need for pressors  - keep MAP >65  - pt takes Metoprolol 12.5mg and atorvastatin BID at home  - resume home meds when patient able to take PO    # Gastrointestinal/Nutrition:   - NPO with NGT placed in the OR  - monitor ostomy output, may still continue to have some old blood in ostomy     #Renal/Genitourinary:   - indwelling urinary catheter  - maintenance fluids when patient NPO  - monitor I's and O's  - replete lytes as necessary    #Hematologic:   - s/p 3u pRBC prior to entering SICU  - s/p 1u pRBC in SICU  - monitor CBC q6 hours  - maintain hemoglobin >7    #Infectious Disease:   - afebrile  - monitor off antibiotics    Lines/Tubes  - RIJ central line  - Left arterial line  - PIV  - roberto  - ostomy    #Endocrine:   - no hx of DM  - FS q6 hours when NPO    #Disposition:   - SICU    --------------------------------------------------------------------------------------    Critical Care Diagnoses:  - Acute blood loss anemia ASSESSMENT:  81y Female with PMH of HTN, Raynaud's disease, rectal CA s/p LAR and diverting ileostomy, pacemaker, right cold leg on Eliquis, presenting with melena, found to have SBO from left inguinal hernia, now s/p EGD (Found to have bleeding duodenal ulcer) s/p hemostasis and reduction of SBO with mesh placement on 10/29.     PLAN:   #Neurologic:   - awake and alert  - pain control with tylenol and dilaudid PRN    #Respiratory:   - breathing comfortably on RA  - incentive spirometry  - maintain SpO2 >92%    #Cardiovascular:   - hemodynamically stable, no need for pressors  - keep MAP >65  - pt takes Metoprolol 12.5mg and atorvastatin BID at home  - resume home meds when patient able to take PO    # Gastrointestinal/Nutrition:   - NPO with NGT placed in the OR  - monitor ostomy output, may still continue to have some old blood in ostomy     #Renal/Genitourinary:   - indwelling urinary catheter  - maintenance fluids when patient NPO  - monitor I's and O's  - replete lytes as necessary    #Hematologic:   - s/p 3u pRBC prior to entering SICU  - s/p 1u pRBC in SICU  - monitor CBC q6 hours  - maintain hemoglobin >7    #Infectious Disease:   - afebrile  - monitor off antibiotics    Lines/Tubes  - RIJ central line  - Left arterial line  - PIV  - roberto  - ostomy    #Endocrine:   - no hx of DM, hemoglobin A1c 4.9  - FS q6 hours when NPO    #Disposition:   - SICU    --------------------------------------------------------------------------------------    Critical Care Diagnoses:  - Acute blood loss anemia ASSESSMENT:  81y Female with PMH of HTN, Raynaud's disease, rectal CA s/p LAR and diverting ileostomy, pacemaker, right cold leg on Eliquis, presenting with melena, found to have SBO from left inguinal hernia, now s/p EGD (Found to have bleeding duodenal ulcer) s/p hemostasis and reduction of SBO with mesh placement on 10/29.       Overnight: Ostomy bag appeared to contain melena 100ml will continue to monitor    PLAN:   #Neurologic:   - awake and alert  - pain control with tylenol --> add lidocaine patch to incision if need to escalation in pain control  - D/c dilaudid    #Respiratory:   - breathing comfortably on 3L NC  - incentive spirometry  - maintain SpO2 >92%    #Cardiovascular:   - hemodynamically stable, no need for pressors  - keep MAP >65, currently MAPs 110s  - pt takes Metoprolol 12.5mg and atorvastatin BID at home; consider restarting  - resume home meds when patient able to take PO    # Gastrointestinal/Nutrition:   - NPO with NGT placed in the OR; consider D/c'ing NGT  - monitor ostomy output, may still continue to have some old blood in ostomy     #Renal/Genitourinary:   - indwelling urinary catheter  - maintenance fluids when patient NPO  - monitor I's and O's  - Start pt at 5 1/2 NS @50  - replete lytes as necessary    #Hematologic:   - s/p 4u pRBC prior to entering SICU  - s/p 1u pRBC in SICU  - CBC Q12  - Avoid AC in setting of bleed   - maintain hemoglobin >7    #Infectious Disease:   - afebrile  - monitor off antibiotics    Lines/Tubes  - d/c a-line, central line, and roberto  - PIV  - ostomy    #Endocrine:   - no hx of DM, hemoglobin A1c 4.9  - FS q6 hours when NPO    Plan: PT consult, D/c lines aside from PIV; get in contact with primary team for seeing about d/c [id number]  #Disposition:   - SICU    --------------------------------------------------------------------------------------    Critical Care Diagnoses:  - Acute blood loss anemia ASSESSMENT:  81y Female with PMH of HTN, Raynaud's disease, rectal CA s/p LAR and diverting ileostomy, pacemaker, right cold leg on Eliquis, presenting with melena, found to have SBO from left inguinal hernia, now s/p EGD (Found to have bleeding duodenal ulcer) s/p hemostasis and reduction of SBO with mesh placement on 10/29.     PLAN:   #Neurologic:   - awake and alert  - tylenol prn --> add lidocaine patch to incision iprn  - D/c dilaudid    #Respiratory:   - breathing comfortably on 3L NC  - incentive spirometry    #Cardiovascular:   - vascular surgery contacted about surgical plan, no invervention now, see hematologic section for AC plan  - MAPs stable, no pressor requirement  - pt takes Metoprolol 12.5mg and atorvastatin BID at home  - started metoprolol 5q6h  - EKG    # Gastrointestinal/Nutrition:   - NPO with NGT placed in the OR; primary team about DC  - monitor ostomy output  - NGT with coffee-ground output  - f/u Hpylori results    #Renal/Genitourinary:   -  D5 1/2 NS @50  - roberto DC'd  - mag repleted    #Hematologic:   - s/p 4u pRBC prior to entering SICU  - s/p 1u pRBC in SICU  - s/p kcentra pre-SICU  - CBC Q12  - vascular surgery contacted, no plan for surgical intervention on RLE, plan to start AC as per recs from GI  - GI contacted about anticoagulation plan, recs pending  - PT/INR stable s/p kcentra  - H/H stable s/p prbcs    #Infectious Disease:   - afebrile  - monitor off antibiotics    Lines/Tubes  - d/c a-line, central line, and roberto  - PIV  - ostomy  - NGT    #Endocrine:   - no hx of DM, hemoglobin A1c 4.9  - FS q6 hours when NPO    #Disposition:   - SICU; consider listing to floor on 10/30    --------------------------------------------------------------------------------------    Critical Care Diagnoses:  - Acute blood loss anemia

## 2021-10-29 NOTE — PROGRESS NOTE ADULT - SUBJECTIVE AND OBJECTIVE BOX
POST ANESTHESIA EVALUATION    81y Female admitted for bowel obstruction now POSTOP DAY 1 s/p Hernia Repair    MENTAL STATUS: Patient participation [x  ] Awake     [  ] Arousable     [  ] Sedated    AIRWAY PATENCY: [  x] Satisfactory  [  ] Other:     Vital Signs Last 24 Hrs  T(C): 36.3 (29 Oct 2021 08:00), Max: 37 (28 Oct 2021 15:25)  T(F): 97.4 (29 Oct 2021 08:00), Max: 98.6 (28 Oct 2021 15:25)  HR: 85 (29 Oct 2021 09:00) (82 - 112)  BP: 121/60 (29 Oct 2021 08:00) (79/56 - 130/57)  BP(mean): 71 (29 Oct 2021 07:00) (68 - 76)  RR: 11 (29 Oct 2021 09:00) (11 - 22)  SpO2: 100% (29 Oct 2021 09:00) (97% - 100%)  I&O's Summary    28 Oct 2021 07:01  -  29 Oct 2021 07:00  --------------------------------------------------------  IN: 650 mL / OUT: 710 mL / NET: -60 mL    29 Oct 2021 07:01  -  29 Oct 2021 10:07  --------------------------------------------------------  IN: 200 mL / OUT: 146 mL / NET: 54 mL          NAUSEA/ VOMITTING:  [ x ] NONE  [  ] CONTROLLED [  ] OTHER     PAIN: [ x ] CONTROLLED WITH CURRENT REGIMEN  [  ] OTHER    [ x ] NO APPARENT ANESTHESIA COMPLICATIONS      Comments: continue care per SICU team

## 2021-10-29 NOTE — PROGRESS NOTE ADULT - SUBJECTIVE AND OBJECTIVE BOX
SURGERY DAILY PROGRESS NOTE:       SUBJECTIVE/ROS: No acute overnight events.     OBJECTIVE:    Vital Signs Last 24 Hrs  T(C): 36.5 (29 Oct 2021 12:00), Max: 37 (28 Oct 2021 15:25)  T(F): 97.7 (29 Oct 2021 12:00), Max: 98.6 (28 Oct 2021 15:25)  HR: 73 (29 Oct 2021 14:00) (73 - 112)  BP: 121/56 (29 Oct 2021 14:00) (79/56 - 167/87)  BP(mean): 71 (29 Oct 2021 14:00) (68 - 106)  RR: 11 (29 Oct 2021 14:00) (11 - 22)  SpO2: 100% (29 Oct 2021 14:00) (99% - 100%)    I&O's Detail    28 Oct 2021 07:01  -  29 Oct 2021 07:00  --------------------------------------------------------  IN:    IV PiggyBack: 50 mL    Lactated Ringers: 600 mL  Total IN: 650 mL    OUT:    Gastrostomy Tube (mL): 220 mL    Indwelling Catheter - Urethral (mL): 490 mL  Total OUT: 710 mL    Total NET: -60 mL      29 Oct 2021 07:01  -  29 Oct 2021 15:22  --------------------------------------------------------  IN:    dextrose 5% + sodium chloride 0.45%: 100 mL    Lactated Ringers: 400 mL  Total IN: 500 mL    OUT:    Colostomy (mL): 100 mL    Indwelling Catheter - Urethral (mL): 346 mL  Total OUT: 446 mL    Total NET: 54 mL    Physical Exam:  General Appearance: Appears well, NAD  Respiratory: No labored breathing  CV: Pulse regularly present  Abdomen: Soft, nontender, laparotomy incision c/d/i  ostomy: melena  NGT: dark output  Ext: R foot cool, blue in color; b/l fem/pop palpable but no R pedal signals    LABS:                        9.8    20.94 )-----------( 206      ( 29 Oct 2021 08:45 )             28.1     10-29    136  |  108<H>  |  25<H>  ----------------------------<  121<H>  4.8   |  14<L>  |  0.84    Ca    7.8<L>      29 Oct 2021 01:42  Phos  3.5     10-29  Mg     1.30     10-29    TPro  3.8<L>  /  Alb  2.1<L>  /  TBili  0.6  /  DBili  x   /  AST  19  /  ALT  11  /  AlkPhos  71  10-29    PT/INR - ( 29 Oct 2021 08:45 )   PT: 10.5 sec;   INR: 0.91 ratio         PTT - ( 29 Oct 2021 08:45 )  PTT:25.7 sec

## 2021-10-29 NOTE — BRIEF OPERATIVE NOTE - NSICDXBRIEFPROCEDURE_GEN_ALL_CORE_FT
PROCEDURES:  Repair, hernia, inguinal, incarcerated, open, using mesh, adult 29-Oct-2021 00:15:38  Gail Wadsworth

## 2021-10-29 NOTE — PROGRESS NOTE ADULT - ATTENDING COMMENTS
Currently feels comfortable. Denies abdominal pain, nausea or vomiting. Some residual melena passing via stoma. Somewhat chronically ill appearing but otherwise comfortable/NAD. Abdomen-soft/nontender. Stoma noted with small amount of old blood. Left inguinal  healing incision noted from hernia repair. Currently stable status post upper GI bleed from duodenal ulcer. Recommendations as noted-monitor serial hemoglobin/hematocrit and observe for rebleeding. Pantoprazole 40 mg IV b.i.d. Following recovery will evaluate for Helicobacter pylori.

## 2021-10-29 NOTE — PROGRESS NOTE ADULT - ASSESSMENT
DAVID QUEEN is a 81y Female with a PPM in place, unclear why, h/o rectal CA s/p rectal surgery with colostomy about 2 weeks prior to presentation at [facility] c/b ?ischemic leg with RLE thrombus started on Eliquis who initially presented to outside hospital with reports coffee ground emesis/melena from colostomy, GI consulted for further evaluation.     Impression:  1. Coffee ground emesis/melena in colostomy s/p EGD with oozing duodenal ulcer s/p epi/bicap  2. High grade SBO c/b possible incarcerated hernia s/p Ex lap repair of incarcerated left inguinal hernia with mesh  3. Leukocytosis c/b lactic acidosis   4. LE Thrombus/PVD  - OSH CTA 10/28 negative for active extravasation     Recommendations:  - c/w PPI IV BID  - Trend Hgb, Transfuse if Hgb < 8 considering cardiac history, which remains unclear  - Ensure adequate hydration and electrolyte repletion   - At increased risk for rebleeding with AC, consider holding over the weekend if possible, but defer final decision to primary, weigh risk vs benefit  - Rest of care per primary team    Thank you for involving us in this patient's care. Please contact GI if any further questions or concerns.    Seen and discussed with Attending Dr. Lonny Nunez MD  Gastroenterology/Hepatology Fellow, PGY-V    NON-URGENT CONSULTS:  Please email [email or web address]  [email or web address]  AT NIGHT AND ON WEEKENDS:  Contact on-call GI fellow via answering service ([phone]) from 5pm-8am and on weekends/holidays  MONDAY-FRIDAY 8AM-5PM:  Pager# [phone] ([facility])  GI Phone# [phone] ([facility])

## 2021-10-29 NOTE — PROGRESS NOTE ADULT - ASSESSMENT
Assessment:  81y Female with PMH of HTN, Raynaud's disease, rectal CA s/p LAR and diverting ileostomy, pacemaker, right cold leg on Eliquis, presenting with melena, found to have SBO from left inguinal hernia, now s/p EGD (Found to have bleeding duodenal ulcer) s/p hemostasis and reduction of SBO with mesh placement on 10/29.     Plan:  - NPO, IVF  - protonix BID  - Pain control per SICU  - Transfuse PRN >7.0  - Appreciate excellent SICU care    B Team Surgery   [id number] Assessment:  81y Female with PMH of HTN, Raynaud's disease, rectal CA s/p LAR and diverting ileostomy, pacemaker, right cold leg on Eliquis, presenting with melena, found to have SBO from left inguinal hernia, now s/p EGD (Found to have bleeding duodenal ulcer) s/p hemostasis and reduction of SBO with mesh placement on 10/29.     Plan:  - NPO, IVF  - protonix BID  - Pain control per SICU  - Trend H/H; Transfuse PRN >7.0  - Follow-up vascular surgery; re: further recs  - Appreciate excellent SICU care    B Team Surgery   [id number]

## 2021-10-29 NOTE — PROGRESS NOTE ADULT - ATTENDING COMMENTS
Bleeding duodenal ulcer  a. s/p EGD with injection of ulcer  b. Continue PPI  c.  May advance diet    Incarcerated left inguinal hernia  a.  s/p LIHR with mesh  b. Incision is clean dry and intact    PAD, right leg  a.  Eliquis on hold at the moment secondary to history of recent bleed  d,  Await vascular sx consultation

## 2021-10-30 LAB
ANION GAP SERPL CALC-SCNC: 7 MMOL/L — SIGNIFICANT CHANGE UP (ref 7–14)
ANION GAP SERPL CALC-SCNC: 8 MMOL/L — SIGNIFICANT CHANGE UP (ref 7–14)
BUN SERPL-MCNC: 22 MG/DL — SIGNIFICANT CHANGE UP (ref 7–23)
BUN SERPL-MCNC: 26 MG/DL — HIGH (ref 7–23)
CALCIUM SERPL-MCNC: 7.5 MG/DL — LOW (ref 8.4–10.5)
CALCIUM SERPL-MCNC: 8.1 MG/DL — LOW (ref 8.4–10.5)
CHLORIDE SERPL-SCNC: 108 MMOL/L — HIGH (ref 98–107)
CHLORIDE SERPL-SCNC: 110 MMOL/L — HIGH (ref 98–107)
CO2 SERPL-SCNC: 21 MMOL/L — LOW (ref 22–31)
CO2 SERPL-SCNC: 21 MMOL/L — LOW (ref 22–31)
CREAT SERPL-MCNC: 0.51 MG/DL — SIGNIFICANT CHANGE UP (ref 0.5–1.3)
CREAT SERPL-MCNC: 0.69 MG/DL — SIGNIFICANT CHANGE UP (ref 0.5–1.3)
GLUCOSE SERPL-MCNC: 107 MG/DL — HIGH (ref 70–99)
GLUCOSE SERPL-MCNC: 122 MG/DL — HIGH (ref 70–99)
HCT VFR BLD CALC: 23.8 % — LOW (ref 34.5–45)
HCT VFR BLD CALC: 26.4 % — LOW (ref 34.5–45)
HGB BLD-MCNC: 8.7 G/DL — LOW (ref 11.5–15.5)
HGB BLD-MCNC: 9.1 G/DL — LOW (ref 11.5–15.5)
MAGNESIUM SERPL-MCNC: 2.1 MG/DL — SIGNIFICANT CHANGE UP (ref 1.6–2.6)
MCHC RBC-ENTMCNC: 30.6 PG — SIGNIFICANT CHANGE UP (ref 27–34)
MCHC RBC-ENTMCNC: 31.4 PG — SIGNIFICANT CHANGE UP (ref 27–34)
MCHC RBC-ENTMCNC: 34.5 GM/DL — SIGNIFICANT CHANGE UP (ref 32–36)
MCHC RBC-ENTMCNC: 36.6 GM/DL — HIGH (ref 32–36)
MCV RBC AUTO: 85.9 FL — SIGNIFICANT CHANGE UP (ref 80–100)
MCV RBC AUTO: 88.9 FL — SIGNIFICANT CHANGE UP (ref 80–100)
NRBC # BLD: 0 /100 WBCS — SIGNIFICANT CHANGE UP
NRBC # BLD: 0 /100 WBCS — SIGNIFICANT CHANGE UP
NRBC # FLD: 0.03 K/UL — HIGH
NRBC # FLD: 0.07 K/UL — HIGH
PHOSPHATE SERPL-MCNC: 1.5 MG/DL — LOW (ref 2.5–4.5)
PLATELET # BLD AUTO: 171 K/UL — SIGNIFICANT CHANGE UP (ref 150–400)
PLATELET # BLD AUTO: 219 K/UL — SIGNIFICANT CHANGE UP (ref 150–400)
POTASSIUM SERPL-MCNC: 4.2 MMOL/L — SIGNIFICANT CHANGE UP (ref 3.5–5.3)
POTASSIUM SERPL-MCNC: 4.6 MMOL/L — SIGNIFICANT CHANGE UP (ref 3.5–5.3)
POTASSIUM SERPL-SCNC: 4.2 MMOL/L — SIGNIFICANT CHANGE UP (ref 3.5–5.3)
POTASSIUM SERPL-SCNC: 4.6 MMOL/L — SIGNIFICANT CHANGE UP (ref 3.5–5.3)
RBC # BLD: 2.77 M/UL — LOW (ref 3.8–5.2)
RBC # BLD: 2.97 M/UL — LOW (ref 3.8–5.2)
RBC # FLD: 15 % — HIGH (ref 10.3–14.5)
RBC # FLD: 15.9 % — HIGH (ref 10.3–14.5)
SODIUM SERPL-SCNC: 137 MMOL/L — SIGNIFICANT CHANGE UP (ref 135–145)
SODIUM SERPL-SCNC: 138 MMOL/L — SIGNIFICANT CHANGE UP (ref 135–145)
WBC # BLD: 13.47 K/UL — HIGH (ref 3.8–10.5)
WBC # BLD: 16.79 K/UL — HIGH (ref 3.8–10.5)
WBC # FLD AUTO: 13.47 K/UL — HIGH (ref 3.8–10.5)
WBC # FLD AUTO: 16.79 K/UL — HIGH (ref 3.8–10.5)

## 2021-10-30 PROCEDURE — 71045 X-RAY EXAM CHEST 1 VIEW: CPT | Mod: 26

## 2021-10-30 PROCEDURE — 99231 SBSQ HOSP IP/OBS SF/LOW 25: CPT | Mod: 24,GC

## 2021-10-30 PROCEDURE — 99232 SBSQ HOSP IP/OBS MODERATE 35: CPT | Mod: GC

## 2021-10-30 RX ORDER — ACETAMINOPHEN 500 MG
650 TABLET ORAL EVERY 6 HOURS
Refills: 0 | Status: DISCONTINUED | OUTPATIENT
Start: 2021-10-30 | End: 2021-11-11

## 2021-10-30 RX ORDER — POTASSIUM PHOSPHATE, MONOBASIC POTASSIUM PHOSPHATE, DIBASIC 236; 224 MG/ML; MG/ML
15 INJECTION, SOLUTION INTRAVENOUS ONCE
Refills: 0 | Status: COMPLETED | OUTPATIENT
Start: 2021-10-30 | End: 2021-10-30

## 2021-10-30 RX ORDER — METOPROLOL TARTRATE 50 MG
5 TABLET ORAL EVERY 6 HOURS
Refills: 0 | Status: DISCONTINUED | OUTPATIENT
Start: 2021-10-30 | End: 2021-10-31

## 2021-10-30 RX ADMIN — Medication 5 MILLIGRAM(S): at 17:17

## 2021-10-30 RX ADMIN — CHLORHEXIDINE GLUCONATE 1 APPLICATION(S): 213 SOLUTION TOPICAL at 01:00

## 2021-10-30 RX ADMIN — Medication 1000 MILLIGRAM(S): at 10:38

## 2021-10-30 RX ADMIN — Medication 5 MILLIGRAM(S): at 23:19

## 2021-10-30 RX ADMIN — HYDROMORPHONE HYDROCHLORIDE 0.25 MILLIGRAM(S): 2 INJECTION INTRAMUSCULAR; INTRAVENOUS; SUBCUTANEOUS at 00:15

## 2021-10-30 RX ADMIN — POTASSIUM PHOSPHATE, MONOBASIC POTASSIUM PHOSPHATE, DIBASIC 62.5 MILLIMOLE(S): 236; 224 INJECTION, SOLUTION INTRAVENOUS at 03:49

## 2021-10-30 RX ADMIN — PANTOPRAZOLE SODIUM 40 MILLIGRAM(S): 20 TABLET, DELAYED RELEASE ORAL at 06:35

## 2021-10-30 RX ADMIN — Medication 5 MILLIGRAM(S): at 06:36

## 2021-10-30 RX ADMIN — Medication 400 MILLIGRAM(S): at 10:23

## 2021-10-30 RX ADMIN — SODIUM CHLORIDE 50 MILLILITER(S): 9 INJECTION, SOLUTION INTRAVENOUS at 12:10

## 2021-10-30 RX ADMIN — PANTOPRAZOLE SODIUM 40 MILLIGRAM(S): 20 TABLET, DELAYED RELEASE ORAL at 17:17

## 2021-10-30 NOTE — PROGRESS NOTE ADULT - ASSESSMENT
81y Female with PMH of HTN, Raynaud's disease, rectal CA s/p LAR and diverting ileostomy, pacemaker, right cold leg on Eliquis, presenting with melena, found to have SBO from left inguinal hernia, now s/p EGD (Found to have bleeding duodenal ulcer) s/p hemostasis and reduction of SBO with mesh placement on 10/29.     PLAN:   #Neurologic:   - awake and alert  - tylenol prn --> add lidocaine patch to incision iprn  - D/c dilaudid    #Respiratory:   - breathing comfortably on 3L NC  - incentive spirometry    #Cardiovascular:   - vascular surgery contacted about surgical plan, no invervention now, see hematologic section for AC plan  - MAPs stable, no pressor requirement  - pt takes Metoprolol 12.5mg and atorvastatin BID at home  - started metoprolol 5q6h  - EKG    # Gastrointestinal/Nutrition:   - NPO with NGT placed in the OR; primary team about DC  - monitor ostomy output  - NGT with coffee-ground output  - f/u Hpylori results    #Renal/Genitourinary:   -  D5 1/2 NS @50  - strict I&O    #Hematologic:   - s/p 4u pRBC prior to entering SICU  - s/p 1u pRBC in SICU  - s/p kcentra pre-SICU  - CBC Q12  - vascular surgery contacted, no plan for surgical intervention on RLE, plan to start AC as per recs from GI  - GI contacted about anticoagulation plan, recs pending  - PT/INR stable s/p kcentra  - H/H stable s/p prbcs    #Infectious Disease:   - afebrile  - monitor off antibiotics    Lines/Tubes  - PIV  - ostomy  - NGT    #Endocrine:   - no hx of DM, hemoglobin A1c 4.9  - FS q6 hours when NPO

## 2021-10-30 NOTE — PHYSICAL THERAPY INITIAL EVALUATION ADULT - PERTINENT HX OF CURRENT PROBLEM, REHAB EVAL
[FreeTextEntry1] : Patient presents for follow-up. [de-identified] : 52F PMHx T2DM (A1c 10.9 9/2019), HTN, HLD, CHRISTA presents for follow-up.\par \par Patient's last interaction with IM clinic 5/26/2020 for f/u visit/examination for sore/itchy throat that started in March in association with ear pain runny nose, red eye, and loss of taste. Patient was found to be COVID positive in April; she underwent social distancing measures. In addition to COVID-19, she was also thought to have allergic rhinitis, she was given Flonase.\par \par Since last phone, patient has been generally healthy but has been complaining of an intermittent scratchy throat since the end of March which has since resolved for a few days. Patient denies any other symptoms, including fever, cough, SOB, lightheadedness or dizziness, N/V, pain on urination, ABD pain/CP, swelling, skin rash. 81F hx of Raynaud's, lupus, HTN, known L inguinal hernia planned for repair later this year, rectal cancer s/p LAR and [facility]ting [facility] c/b post op R cold leg on eliquis here with 1 day of hematemesis and melena through ostomy found to have high grade SBO 2/2 L inguinal hernia.

## 2021-10-30 NOTE — CHART NOTE - NSCHARTNOTEFT_GEN_A_CORE
Vascular Surgery Update Note    SICU team informed vascular team that the patient had an AT and PT signal present in the RLE. Previously, the RLE had no pedal signals. Patient was also endorsing pain in the R foot, specifically in the toes.    The patient was seen and evaluated with the SICU team. The R foot appeared perfused, with the exception of the toes which were blue in color. The foot was warm. A R AT and PT doppler signal were obtained. Foot was marked where the doppler signals were found.    Pain could possibly be due to rest pain vs Raynaud's vs neuropathic vs other etiology. Hot packs were placed on the toes by SICU team.    Will continue to follow.      Vascular (C Team) Surgery  [id number]

## 2021-10-30 NOTE — PROGRESS NOTE ADULT - SUBJECTIVE AND OBJECTIVE BOX
Team B Surgery Progress Note    SUBJECTIVE  Status post laparotomy, repair of incarcerated left inguinal hernia with mesh, EGD by GI and cauterization/epi injection to bleeding duodenal ulcer. Extubated in SICU, Recovering well. Ostomy with melanotic, dark output. No gas.    OBJECTIVE  ___________________________________________________  VITAL SIGNS / I&O's   Vital Signs Last 24 Hrs  T(C): 36.8 (30 Oct 2021 12:00), Max: 36.8 (29 Oct 2021 20:00)  T(F): 98.3 (30 Oct 2021 12:00), Max: 98.3 (30 Oct 2021 04:00)  HR: 78 (30 Oct 2021 12:00) (69 - 96)  BP: 121/58 (30 Oct 2021 12:00) (92/55 - 164/75)  BP(mean): 74 (30 Oct 2021 12:00) (63 - 97)  RR: 15 (30 Oct 2021 12:00) (10 - 20)  SpO2: 96% (30 Oct 2021 12:00) (96% - 100%)      29 Oct 2021 07:01  -  30 Oct 2021 07:00  --------------------------------------------------------  IN:    dextrose 5% + sodium chloride 0.45%: 950 mL    IV PiggyBack: 250 mL    Lactated Ringers: 400 mL  Total IN: 1600 mL    OUT:    Colostomy (mL): 150 mL    Gastrostomy Tube (mL): 625 mL    Indwelling Catheter - Urethral (mL): 346 mL    Voided (mL): 850 mL  Total OUT: 1971 mL    Total NET: -371 mL      30 Oct 2021 07:01  -  30 Oct 2021 15:51  --------------------------------------------------------  IN:    dextrose 5% + sodium chloride 0.45%: 100 mL  Total IN: 100 mL    OUT:    Voided (mL): 800 mL  Total OUT: 800 mL    Total NET: -700 mL        ___________________________________________________  PHYSICAL EXAM    General Appearance: Appears well, NAD  Respiratory: No labored breathing  CV: Pulse regularly present  Abdomen: Soft, nontender, laparotomy incision c/d/i  ostomy: melena  NGT: dark output    ___________________________________________________  LABS                        8.7    13.47 )-----------( 219      ( 30 Oct 2021 09:49 )             23.8     30 Oct 2021 11:17    137    |  108    |  22     ----------------------------<  107    4.2     |  21     |  0.51     Ca    7.5        30 Oct 2021 11:17  Phos  1.5       30 Oct 2021 02:37  Mg     2.10      30 Oct 2021 02:37    TPro  3.8    /  Alb  2.1    /  TBili  0.6    /  DBili  x      /  AST  19     /  ALT  11     /  AlkPhos  71     29 Oct 2021 01:42    PT/INR - ( 29 Oct 2021 08:45 )   PT: 10.5 sec;   INR: 0.91 ratio         PTT - ( 29 Oct 2021 08:45 )  PTT:25.7 sec  ______________________________________  MEDICATIONS  (STANDING):  chlorhexidine 4% Liquid 1 Application(s) Topical <User Schedule>  metoprolol tartrate Injectable 5 milliGRAM(s) IV Push every 6 hours  pantoprazole  Injectable 40 milliGRAM(s) IV Push every 12 hours    MEDICATIONS  (PRN):  acetaminophen     Tablet .. 650 milliGRAM(s) Oral every 6 hours PRN Temp greater or equal to 38C (100.4F), Mild Pain (1 - 3)

## 2021-10-30 NOTE — PROGRESS NOTE ADULT - ATTENDING COMMENTS
Bleeding duodenal ulcer  a.  Continue PPI BID  b.  Diet as tolerated  c.  Hold Anti-plt  over weekend per  GI  d.  Hgb and hemodynamically stable/normal    LIHR  A.  Incision clean dry and intact    PAD  a.  Appreciate vascular recommendations

## 2021-10-30 NOTE — PHYSICAL THERAPY INITIAL EVALUATION ADULT - ADDITIONAL COMMENTS
As per daughter at bedside, patient was independent prior to surgery last week. Pt has 1 step to enter her home and has the option of staying on first floor.

## 2021-10-30 NOTE — PROGRESS NOTE ADULT - SUBJECTIVE AND OBJECTIVE BOX
Interval Events:   Dark output through NG tube overnight, dark maroon output through ostomy, no fresh blood present.    ROS:   A 12-point ROS was performed and negative except as noted in HPI.    Hospital Medications:  acetaminophen   IVPB .. 1000 milliGRAM(s) IV Intermittent every 6 hours PRN  chlorhexidine 4% Liquid 1 Application(s) Topical <User Schedule>  dextrose 5% + sodium chloride 0.45%. 1000 milliLiter(s) IV Continuous <Continuous>  metoprolol tartrate Injectable 5 milliGRAM(s) IV Push every 6 hours  pantoprazole  Injectable 40 milliGRAM(s) IV Push every 12 hours      PHYSICAL EXAM:   Vital Signs:  Vital Signs Last 24 Hrs  T(C): 36.8 (30 Oct 2021 08:00), Max: 36.8 (29 Oct 2021 20:00)  T(F): 98.3 (30 Oct 2021 08:00), Max: 98.3 (30 Oct 2021 04:00)  HR: 69 (30 Oct 2021 09:00) (69 - 98)  BP: 130/55 (30 Oct 2021 09:00) (92/55 - 167/87)  BP(mean): 74 (30 Oct 2021 09:00) (63 - 106)  RR: 10 (30 Oct 2021 09:00) (10 - 20)  SpO2: 100% (30 Oct 2021 09:00) (99% - 100%)  Daily     Daily Weight in k.7 (30 Oct 2021 01:00)    GENERAL: no acute distress  NEURO: alert  HEENT: anicteric sclera, no conjunctival pallor appreciated  CHEST: no respiratory distress, no accessory muscle use  CARDIAC: regular rate, +S1/S2  ABDOMEN: dark maroon stool in ostomy, soft, nondistended, nontender, no rebound or guarding  EXTREMITIES: warm, well perfused, no edema  SKIN: no lesions noted    LABS: reviewed                        8.7    13.47 )-----------( 219      ( 30 Oct 2021 09:49 )             23.8     10    137  |  108<H>  |  22  ----------------------------<  107<H>  4.2   |  21<L>  |  0.51    Ca    7.5<L>      30 Oct 2021 11:17  Phos  1.5     10-30  Mg     2.10     10-30    TPro  3.8<L>  /  Alb  2.1<L>  /  TBili  0.6  /  DBili  x   /  AST  19  /  ALT  11  /  AlkPhos  71  10-29    LIVER FUNCTIONS - ( 29 Oct 2021 01:42 )  Alb: 2.1 g/dL / Pro: 3.8 g/dL / ALK PHOS: 71 U/L / ALT: 11 U/L / AST: 19 U/L / GGT: x             Interval Diagnostic Studies: see sunrise for full report

## 2021-10-30 NOTE — PROGRESS NOTE ADULT - SUBJECTIVE AND OBJECTIVE BOX
Progress note vascular Team    Subjective: No overnight events. Patient seen and examined this AM by vascular team. Denies lower extremity pain, SOB, fevers or chills. Patient able to move her toes and feet.       Objective  Vital Signs Last 24 Hrs  T(C): 36.8 (30 Oct 2021 12:00), Max: 36.8 (29 Oct 2021 20:00)  T(F): 98.3 (30 Oct 2021 12:00), Max: 98.3 (30 Oct 2021 04:00)  HR: 78 (30 Oct 2021 12:00) (69 - 96)  BP: 121/58 (30 Oct 2021 12:00) (92/55 - 164/75)  BP(mean): 74 (30 Oct 2021 12:00) (63 - 97)  RR: 15 (30 Oct 2021 12:00) (10 - 20)  SpO2: 96% (30 Oct 2021 12:00) (96% - 100%)      PHYSICAL EXAM:    General Appearance:  NAD  Respiratory: Unlabored breathing  CV: RRR  Abdomen: Soft, nontender, laparotomy incision c/d/i  ostomy: ostomy with dark bloody output  Ext: R foot cool but improved from previous days; b/l  DP + signals , b/l + PT signals . Mobility and sensation is conserved.       LABS:                        8.7    13.47 )-----------( 219      ( 30 Oct 2021 09:49 )             23.8     10-30    137  |  108<H>  |  22  ----------------------------<  107<H>  4.2   |  21<L>  |  0.51    Ca    7.5<L>      30 Oct 2021 11:17  Phos  1.5     10-30  Mg     2.10     10-30    TPro  3.8<L>  /  Alb  2.1<L>  /  TBili  0.6  /  DBili  x   /  AST  19  /  ALT  11  /  AlkPhos  71  10-29    PT/INR - ( 29 Oct 2021 08:45 )   PT: 10.5 sec;   INR: 0.91 ratio         PTT - ( 29 Oct 2021 08:45 )  PTT:25.7 sec    POCT Blood Glucose.: 130 mg/dL (29 Oct 2021 02:31)    ABG - ( 29 Oct 2021 00:09 )  pH, Arterial: 7.33  pH, Blood: x     /  pCO2: 32    /  pO2: 214   / HCO3: 17    / Base Excess: -8.2  /  SaO2: 98.9        MEDS  acetaminophen     Tablet .. 650 milliGRAM(s) Oral every 6 hours PRN  chlorhexidine 4% Liquid 1 Application(s) Topical <User Schedule>  metoprolol tartrate Injectable 5 milliGRAM(s) IV Push every 6 hours  pantoprazole  Injectable 40 milliGRAM(s) IV Push every 12 hours

## 2021-10-30 NOTE — PHYSICAL THERAPY INITIAL EVALUATION ADULT - GENERAL OBSERVATIONS, REHAB EVAL
Patient found semi reclined in bed in SICU; daughter at bedside; hx Raynauds with bilateral hands fingertips cool to touch; right foot toes with discoloration with RN aware

## 2021-10-30 NOTE — PROGRESS NOTE ADULT - ASSESSMENT
Assessment:  81y Female with PMH of HTN, Raynaud's disease, rectal CA s/p LAR and diverting ileostomy, pacemaker, right cold leg on Eliquis, presenting with melena, found to have SBO from left inguinal hernia, now s/p EGD (Found to have bleeding duodenal ulcer) s/p hemostasis and reduction of SBO with mesh placement on 10/29.     Plan:  - dc NGT  - NPO, IVF  - protonix BID  - Pain control per SICU  - Trend H/H; Transfuse PRN >7.0  - Follow-up vascular surgery; re: further recs  - Appreciate excellent SICU care    B Team Surgery   [id number]

## 2021-10-30 NOTE — PROGRESS NOTE ADULT - ASSESSMENT
81F raynaud's recent LAR with diverting ostomy for rectal cancer c/b post op R cold leg started on eliquis now here with acute GI bleed and SBO 2/2 L inguinal hernia. Vascular surgery following for R cold foot.    Plan:   - Will need AC when cleared from GI  (hold AC for the weekend per GI recommendation)  - Continue excellent care by SICU.     C TEAM SURGERY  [id number]

## 2021-10-30 NOTE — PROGRESS NOTE ADULT - ASSESSMENT
DAVID QUEEN is a 81y Female with a PPM in place, unclear why, h/o rectal CA s/p rectal surgery with colostomy about 2 weeks prior to presentation at [facility] c/b ?ischemic leg with RLE thrombus started on Eliquis who initially presented to outside hospital with reports coffee ground emesis/melena from colostomy, GI consulted for further evaluation.     Impression:  1. Coffee ground emesis/melena in colostomy s/p EGD with oozing duodenal ulcer s/p epi/bicap  2. High grade SBO c/b possible incarcerated hernia s/p Ex lap repair of incarcerated left inguinal hernia with mesh  3. Leukocytosis c/b lactic acidosis   4. LE Thrombus/PVD  - OSH CTA 10/28 negative for active extravasation     Recommendations:  - c/w PPI IV BID  - Trend Hgb, Transfuse if Hgb < 8 considering cardiac history  - Ensure adequate hydration and electrolyte repletion   - At increased risk for rebleeding with AC, consider holding over the weekend if possible, but defer final decision to primary, weigh risk vs benefit  - dark material in NG output and dark maroon output in colostomy without fresh blood present.  Currently no acute indication for endoscopy, however would consider repeat endoscopy pending clinical course if patient develops worsening anemia without clear etiology, signs of overt GI bleeding, and medically optimized prior to procedure      Tariq Goode, PGY-4  GI/Hepatology Fellow    MONDAY-FRIDAY 8AM-5PM:  Pager# [phone] ([facility]) or [phone] ([facility])    NON-URGENT CONSULTS:  Please email [email or web address]  AT NIGHT AND ON WEEKENDS:  Contact on-call GI fellow via answering service ([phone]) from 5pm-8am and on weekends/holidays

## 2021-10-30 NOTE — PROGRESS NOTE ADULT - ATTENDING COMMENTS
recovering as expected    ADAT  coordinate A-C management between vacular and general surgery services  stable for transfer to regular [facility]cher recovering as expected    consider NGT clamp trial  coordinate A-C management between vacular and general surgery services  stable for transfer to regular [facility]

## 2021-10-30 NOTE — CHART NOTE - NSCHARTNOTEFT_GEN_A_CORE
SICU Transfer Note    Transfer from: SICU  Transfer to:  8S- B Team surgery    SICU COURSE:  81y Female with PMH of HTN, Raynaud's disease, rectal CA s/p LAR and diverting ileostomy, pacemaker, right cold leg on Eliquis, presenting with melena, found to have SBO from left inguinal hernia, now s/p EGD (Found to have bleeding duodenal ulcer) s/p hemostasis and reduction of SBO with mesh placement on 10/29. Advanced to CLD on 10/31 and tolerating diet.     ASSESSMENT & PLAN:   81 year olf female s/p LAR and diverting ileostomy with right cold leg on Eliquis and s/p hemostatis and reduction of SBO with mesh on 10/29.   - Right leg colder than left. Palpable fem and popliteal pulses, doppler DP and AT.   - Holding anticoagulation for concern of GI bleed  - F/u H- Pylori results   - H/H Stable; given 5 units pRBC in SICU          Vital Signs Last 24 Hrs  T(C): 36.8 (30 Oct 2021 21:45), Max: 36.8 (30 Oct 2021 04:00)  T(F): 98.3 (30 Oct 2021 21:45), Max: 98.3 (30 Oct 2021 04:00)  HR: 84 (30 Oct 2021 23:28) (69 - 95)  BP: 143/74 (30 Oct 2021 23:28) (92/55 - 148/71)  BP(mean): 78 (30 Oct 2021 20:00) (63 - 86)  RR: 18 (30 Oct 2021 23:28) (10 - 19)  SpO2: 98% (30 Oct 2021 23:28) (96% - 100%)  I&O's Summary    29 Oct 2021 07:01  -  30 Oct 2021 07:00  --------------------------------------------------------  IN: 1600 mL / OUT: 1971 mL / NET: -371 mL    30 Oct 2021 07:01  -  30 Oct 2021 23:57  --------------------------------------------------------  IN: 465 mL / OUT: 1101 mL / NET: -636 mL          MEDICATIONS  (STANDING):  chlorhexidine 4% Liquid 1 Application(s) Topical <User Schedule>  metoprolol tartrate Injectable 5 milliGRAM(s) IV Push every 6 hours  pantoprazole  Injectable 40 milliGRAM(s) IV Push every 12 hours    MEDICATIONS  (PRN):  acetaminophen     Tablet .. 650 milliGRAM(s) Oral every 6 hours PRN Temp greater or equal to 38C (100.4F), Mild Pain (1 - 3)        LABS                                            8.7                   Neurophils% (auto):   x      (10-30 @ 09:49):    13.47)-----------(219          Lymphocytes% (auto):  x                                             23.8                   Eosinphils% (auto):   x        Manual%: Neutrophils x    ; Lymphocytes x    ; Eosinophils x    ; Bands%: x    ; Blasts x                                    137    |  108    |  22                  Calcium: 7.5   / iCa: x      (10-30 @ 11:17)    ----------------------------<  107       Magnesium: x                                4.2     |  21     |  0.51             Phosphorous: x

## 2021-10-30 NOTE — PHYSICAL THERAPY INITIAL EVALUATION ADULT - PLANNED THERAPY INTERVENTIONS, PT EVAL
Patient left sitting in chair, in NAD, call bell in reach, all lines intact./balance training/bed mobility training/gait training/strengthening/transfer training
none

## 2021-10-30 NOTE — PROGRESS NOTE ADULT - SUBJECTIVE AND OBJECTIVE BOX
SICU Daily Progress Note  =====================================================  Interval/Overnight Events:     - Complaints of pain to right foot overnight.  - Vascular exam checked, patient now with dopplerable AT/PT of RLE. Vascular evaluated the patient and confirmed doppler exam. Warm pack applied to foot in the setting of h/o raynauds disease.       	    81F with PMH of HTN, osteoporosis, Raynaud disease, varicose veins, rectal CA s/p LAR and diverting transverse colostomy c/b Right cold leg on Eliquis, and pacemaker here with 1 day of hematemesis and melena now found to have a high grade SBO 2/2 to L inguinal hernia. NGT with dark blood output. Hemoglobin was 3, s/p pRBC x 3 units in the ED, FFP x 2 units and KCentra. In the OR, EGD by GI, showing duodenal ulcer that was bleeding s/p coagulation and injected with epinephrine. Incarcerated left direct inguinal hernia which was reduced and repaired with plug/mesh, bowel viable. During case, 2250cc LR; 10cc EBL; 1u pRBC; 50cc UOP    PAST MEDICAL & SURGICAL HISTORY:  Hypertension  Osteoporosis  Varicose vein of leg  Raynaud disease  History of left inguinal hernia  Rectal cancer  Inguinal hernia  History of low anterior resection of rectum  diverting colostomy 10/2021 for rectal cancer      ALLERGIES:  ACE inhibitors (Angioedema)  amoxicillin (Angioedema)  irbesartan (Angioedema)  Norvasc (Angioedema)    --------------------------------------------------------------------------------------    MEDICATIONS:    Neurologic Medications  acetaminophen   IVPB .. 1000 milliGRAM(s) IV Intermittent every 6 hours PRN Mild Pain (1 - 3)    Cardiovascular Medications  metoprolol tartrate Injectable 5 milliGRAM(s) IV Push every 6 hours    Gastrointestinal Medications  dextrose 5% + sodium chloride 0.45%. 1000 milliLiter(s) IV Continuous <Continuous>  pantoprazole  Injectable 40 milliGRAM(s) IV Push every 12 hours    Topical/Other Medications  chlorhexidine 4% Liquid 1 Application(s) Topical <User Schedule>    --------------------------------------------------------------------------------------    VITAL SIGNS:  ICU Vital Signs Last 24 Hrs  T(C): 36.6 (29 Oct 2021 16:00), Max: 36.6 (29 Oct 2021 16:00)  T(F): 97.9 (29 Oct 2021 16:00), Max: 97.9 (29 Oct 2021 16:00)  HR: 81 (29 Oct 2021 20:00) (73 - 98)  BP: 150/76 (29 Oct 2021 20:00) (115/55 - 167/87)  BP(mean): 93 (29 Oct 2021 20:00) (68 - 106)  ABP: 127/62 (29 Oct 2021 13:00) (113/63 - 169/77)  ABP(mean): 93 (29 Oct 2021 12:00) (71 - 154)  RR: 16 (29 Oct 2021 20:00) (10 - 18)  SpO2: 100% (29 Oct 2021 20:00) (99% - 100%)    --------------------------------------------------------------------------------------    INS AND OUTS:  I&O's Detail    28 Oct 2021 07:01  -  29 Oct 2021 07:00  --------------------------------------------------------  IN:    IV PiggyBack: 50 mL    Lactated Ringers: 600 mL  Total IN: 650 mL    OUT:    Gastrostomy Tube (mL): 220 mL    Indwelling Catheter - Urethral (mL): 490 mL  Total OUT: 710 mL    Total NET: -60 mL      29 Oct 2021 07:01  -  30 Oct 2021 00:13  --------------------------------------------------------  IN:    dextrose 5% + sodium chloride 0.45%: 350 mL    Lactated Ringers: 400 mL  Total IN: 750 mL    OUT:    Colostomy (mL): 100 mL    Indwelling Catheter - Urethral (mL): 346 mL  Total OUT: 446 mL    Total NET: 304 mL        --------------------------------------------------------------------------------------    EXAM    NEURO: NAD, A/Ox3, resting comfortably  HEENT: NC/AT  RESPIRATORY: nonlabored respirations, normal CW expansion  CARDIO: RRR, S1S2  ABDOMEN: soft, nontender, nondistended, + NGT, ostomy with dark bloody output, - gas, LLQ incision c/d/i  EXTREMITIES: normal strength, sensation intact, RLE +pedal pulses dopplerable +PT/AT, +fem and popliteal. R big toe with darkening distally. LLE with palpable pulses.     --------------------------------------------------------------------------------------    LABS      --------------------------------------------------------------------------------------

## 2021-10-31 LAB
ANION GAP SERPL CALC-SCNC: 8 MMOL/L — SIGNIFICANT CHANGE UP (ref 7–14)
BUN SERPL-MCNC: 18 MG/DL — SIGNIFICANT CHANGE UP (ref 7–23)
CALCIUM SERPL-MCNC: 7.9 MG/DL — LOW (ref 8.4–10.5)
CHLORIDE SERPL-SCNC: 105 MMOL/L — SIGNIFICANT CHANGE UP (ref 98–107)
CO2 SERPL-SCNC: 23 MMOL/L — SIGNIFICANT CHANGE UP (ref 22–31)
CREAT SERPL-MCNC: 0.52 MG/DL — SIGNIFICANT CHANGE UP (ref 0.5–1.3)
GLUCOSE BLDC GLUCOMTR-MCNC: 100 MG/DL — HIGH (ref 70–99)
GLUCOSE SERPL-MCNC: 79 MG/DL — SIGNIFICANT CHANGE UP (ref 70–99)
HCT VFR BLD CALC: 25.4 % — LOW (ref 34.5–45)
HGB BLD-MCNC: 8.9 G/DL — LOW (ref 11.5–15.5)
MAGNESIUM SERPL-MCNC: 1.7 MG/DL — SIGNIFICANT CHANGE UP (ref 1.6–2.6)
MCHC RBC-ENTMCNC: 31.4 PG — SIGNIFICANT CHANGE UP (ref 27–34)
MCHC RBC-ENTMCNC: 35 GM/DL — SIGNIFICANT CHANGE UP (ref 32–36)
MCV RBC AUTO: 89.8 FL — SIGNIFICANT CHANGE UP (ref 80–100)
NRBC # BLD: 0 /100 WBCS — SIGNIFICANT CHANGE UP
NRBC # FLD: 0.04 K/UL — HIGH
PHOSPHATE SERPL-MCNC: 1.4 MG/DL — LOW (ref 2.5–4.5)
PLATELET # BLD AUTO: 219 K/UL — SIGNIFICANT CHANGE UP (ref 150–400)
POTASSIUM SERPL-MCNC: 3.6 MMOL/L — SIGNIFICANT CHANGE UP (ref 3.5–5.3)
POTASSIUM SERPL-SCNC: 3.6 MMOL/L — SIGNIFICANT CHANGE UP (ref 3.5–5.3)
RBC # BLD: 2.83 M/UL — LOW (ref 3.8–5.2)
RBC # FLD: 17.3 % — HIGH (ref 10.3–14.5)
SODIUM SERPL-SCNC: 136 MMOL/L — SIGNIFICANT CHANGE UP (ref 135–145)
WBC # BLD: 12.6 K/UL — HIGH (ref 3.8–10.5)
WBC # FLD AUTO: 12.6 K/UL — HIGH (ref 3.8–10.5)

## 2021-10-31 RX ORDER — MAGNESIUM SULFATE 500 MG/ML
2 VIAL (ML) INJECTION ONCE
Refills: 0 | Status: COMPLETED | OUTPATIENT
Start: 2021-10-31 | End: 2021-10-31

## 2021-10-31 RX ORDER — POTASSIUM PHOSPHATE, MONOBASIC POTASSIUM PHOSPHATE, DIBASIC 236; 224 MG/ML; MG/ML
30 INJECTION, SOLUTION INTRAVENOUS ONCE
Refills: 0 | Status: COMPLETED | OUTPATIENT
Start: 2021-10-31 | End: 2021-10-31

## 2021-10-31 RX ADMIN — Medication 650 MILLIGRAM(S): at 23:28

## 2021-10-31 RX ADMIN — POTASSIUM PHOSPHATE, MONOBASIC POTASSIUM PHOSPHATE, DIBASIC 83.33 MILLIMOLE(S): 236; 224 INJECTION, SOLUTION INTRAVENOUS at 18:07

## 2021-10-31 RX ADMIN — CHLORHEXIDINE GLUCONATE 1 APPLICATION(S): 213 SOLUTION TOPICAL at 07:30

## 2021-10-31 RX ADMIN — Medication 650 MILLIGRAM(S): at 23:58

## 2021-10-31 RX ADMIN — PANTOPRAZOLE SODIUM 40 MILLIGRAM(S): 20 TABLET, DELAYED RELEASE ORAL at 18:20

## 2021-10-31 RX ADMIN — PANTOPRAZOLE SODIUM 40 MILLIGRAM(S): 20 TABLET, DELAYED RELEASE ORAL at 06:26

## 2021-10-31 RX ADMIN — Medication 50 GRAM(S): at 18:08

## 2021-10-31 RX ADMIN — Medication 5 MILLIGRAM(S): at 06:26

## 2021-10-31 NOTE — PROGRESS NOTE ADULT - ASSESSMENT
DAVID QUEEN is a 81y Female with a PPM in place, unclear why, h/o rectal CA s/p rectal surgery with colostomy about 2 weeks prior to presentation at [facility] c/b ?ischemic leg with RLE thrombus started on Eliquis who initially presented to outside hospital with reports coffee ground emesis/melena from colostomy, GI consulted for further evaluation.     Impression:  1. Coffee ground emesis/melena in colostomy s/p EGD with oozing duodenal ulcer s/p epi/bicap  2. High grade SBO c/b possible incarcerated hernia s/p Ex lap repair of incarcerated left inguinal hernia with mesh  3. Leukocytosis c/b lactic acidosis   4. LE Thrombus/PVD  - OSH CTA 10/28 negative for active extravasation     Recommendations:  - c/w PPI IV BID  - Trend Hgb, Transfuse if Hgb < 8 considering cardiac history  - Ensure adequate hydration and electrolyte repletion   - At increased risk for rebleeding with AC, consider holding over the weekend if possible, but defer final decision to primary, weigh risk vs benefit  - Currently no acute indication for endoscopy, however would consider repeat endoscopy pending clinical course if patient develops worsening anemia without clear etiology, signs of overt GI bleeding, and medically optimized prior to procedure      Tariq Goode, PGY-4  GI/Hepatology Fellow    MONDAY-FRIDAY 8AM-5PM:  Pager# [phone] ([facility]) or [phone] ([facility])    NON-URGENT CONSULTS:  Please email [email or web address]  AT NIGHT AND ON WEEKENDS:  Contact on-call GI fellow via answering service ([phone]) from 5pm-8am and on weekends/holidays

## 2021-10-31 NOTE — PROGRESS NOTE ADULT - SUBJECTIVE AND OBJECTIVE BOX
Interval Events:   No acute events overnight after transfer to floor.  Patient denies any acute symptoms at this time.  No overt GI bleeding.    ROS:   A 12-point ROS was performed and negative except as noted in HPI.    Hospital Medications:  acetaminophen     Tablet .. 650 milliGRAM(s) Oral every 6 hours PRN  chlorhexidine 4% Liquid 1 Application(s) Topical <User Schedule>  magnesium sulfate  IVPB 2 Gram(s) IV Intermittent once  pantoprazole  Injectable 40 milliGRAM(s) IV Push every 12 hours  potassium phosphate IVPB 30 milliMole(s) IV Intermittent once      PHYSICAL EXAM:   Vital Signs:  Vital Signs Last 24 Hrs  T(C): 36.8 (31 Oct 2021 10:04), Max: 37.2 (31 Oct 2021 06:58)  T(F): 98.2 (31 Oct 2021 10:04), Max: 99 (31 Oct 2021 06:58)  HR: 74 (31 Oct 2021 10:04) (74 - 85)  BP: 138/65 (31 Oct 2021 10:04) (119/57 - 148/71)  BP(mean): 78 (30 Oct 2021 20:00) (74 - 78)  RR: 18 (31 Oct 2021 10:04) (17 - 19)  SpO2: 95% (31 Oct 2021 10:04) (95% - 99%)  Daily     Daily Weight in k (31 Oct 2021 06:58)    GENERAL: no acute distress  NEURO: alert  HEENT: anicteric sclera, no conjunctival pallor appreciated  CHEST: no respiratory distress, no accessory muscle use  CARDIAC: regular rate, +S1/S2  ABDOMEN: ostomy with dark brown output, soft, nondistended, nontender, no rebound or guarding  EXTREMITIES: warm, well perfused, no edema  SKIN: no lesions noted    LABS: reviewed                        8.9    12.60 )-----------( 219      ( 31 Oct 2021 06:57 )             25.4     10-31    136  |  105  |  18  ----------------------------<  79  3.6   |  23  |  0.52    Ca    7.9<L>      31 Oct 2021 06:57  Phos  1.4     10-31  Mg     1.70     10-31          Interval Diagnostic Studies: see sunrise for full report

## 2021-10-31 NOTE — PROGRESS NOTE ADULT - SUBJECTIVE AND OBJECTIVE BOX
Subjective:   Patient seen and examined at bedside. No acute events overnight.     Objective:   Vital Signs Last 24 Hrs  T(C): 36.8 (30 Oct 2021 21:45), Max: 36.8 (30 Oct 2021 04:00)  T(F): 98.3 (30 Oct 2021 21:45), Max: 98.3 (30 Oct 2021 04:00)  HR: 84 (30 Oct 2021 23:28) (69 - 85)  BP: 143/74 (30 Oct 2021 23:28) (113/63 - 148/71)  BP(mean): 78 (30 Oct 2021 20:00) (73 - 86)  RR: 18 (30 Oct 2021 23:28) (10 - 19)  SpO2: 98% (30 Oct 2021 23:28) (96% - 100%)  I&O's Summary    29 Oct 2021 07:01  -  30 Oct 2021 07:00  --------------------------------------------------------  IN: 1600 mL / OUT: 1971 mL / NET: -371 mL    30 Oct 2021 07:01  -  31 Oct 2021 02:43  --------------------------------------------------------  IN: 465 mL / OUT: 1101 mL / NET: -636 mL        Physical Exam:  General: NAD, resting comfortably in bed  Pulmonary: Nonlabored breathing, no respiratory distress  Cardiovascular: NSR  Abdominal: soft, NT/ND. Incisions c,d,i.   Extremities: WWP      LABS:                        8.7    13.47 )-----------( 219      ( 30 Oct 2021 09:49 )             23.8     10-30    137  |  108<H>  |  22  ----------------------------<  107<H>  4.2   |  21<L>  |  0.51    Ca    7.5<L>      30 Oct 2021 11:17  Phos  1.5     10-30  Mg     2.10     10-30      PT/INR - ( 29 Oct 2021 08:45 )   PT: 10.5 sec;   INR: 0.91 ratio         PTT - ( 29 Oct 2021 08:45 )  PTT:25.7 sec    metoprolol tartrate Injectable 5      Radiology and Additional Studies:    Assessment and Plan:

## 2021-10-31 NOTE — PROGRESS NOTE ADULT - ASSESSMENT
81F w/ Raynaud's recent LAR with diverting ostomy for rectal cancer c/b post op R cold leg started on Eliquis now here with acute GI bleed and SBO 2/2 L inguinal hernia. Vascular surgery following for R cold foot.    Plan:   - Please optimize medical management for patient (ASA/stain, HTN control, DM control)  - Potential plan for AC pending clearance by GI (potentially Monday)  - Please stop beta blockers as this can exacerbate Raynaud's  - Warming blanket to R foot  - Continue care per primary team    Vascular Surgery, [id number]   81F w/ Raynaud's recent LAR with diverting ostomy for rectal cancer c/b post op R cold leg started on Eliquis now here with acute GI bleed and SBO 2/2 L inguinal hernia. Vascular surgery following for R cold foot.    Plan:   - Please optimize medical management for patient (ASA/stain, HTN control, DM control)  - Potential plan for AC pending clearance by GI (potentially Monday)  - Recommend avoidance of beta blockers as this can exacerbate Raynaud's  - Warming blanket to R foot  - Continue care per primary team    Vascular Surgery, [id number]

## 2021-10-31 NOTE — PROGRESS NOTE ADULT - SUBJECTIVE AND OBJECTIVE BOX
SURGERY DAILY PROGRESS NOTE:       SUBJECTIVE/ROS: No acute overnight events. Patient seen & examined at bedside. Doing well today without any complaints.    OBJECTIVE:    Vital Signs Last 24 Hrs  T(C): 36.8 (31 Oct 2021 10:04), Max: 37.2 (31 Oct 2021 06:58)  T(F): 98.2 (31 Oct 2021 10:04), Max: 99 (31 Oct 2021 06:58)  HR: 74 (31 Oct 2021 10:04) (74 - 85)  BP: 138/65 (31 Oct 2021 10:04) (119/57 - 148/71)  BP(mean): 78 (30 Oct 2021 20:00) (74 - 78)  RR: 18 (31 Oct 2021 10:04) (15 - 19)  SpO2: 95% (31 Oct 2021 10:04) (95% - 99%)    I&O's Detail    30 Oct 2021 07:01  -  31 Oct 2021 07:00  --------------------------------------------------------  IN:    dextrose 5% + sodium chloride 0.45%: 100 mL    Oral Fluid: 365 mL  Total IN: 465 mL    OUT:    Colostomy (mL): 800 mL    Voided (mL): 1501 mL  Total OUT: 2301 mL    Total NET: -1836 mL      31 Oct 2021 07:01  -  31 Oct 2021 11:37  --------------------------------------------------------  IN:  Total IN: 0 mL    OUT:    Voided (mL): 300 mL  Total OUT: 300 mL    Total NET: -300 mL    General Appearance:  NAD  Respiratory: Unlabored breathing  Abdomen: Soft, nontender, laparotomy incision c/d/i  ostomy: ostomy with dark bloody output  Ext: R foot cool but improved from previous days; L DP & PT both palpable; R venous PT signal; R arterial DP signal. Mobility and sensation is conserved. R great toe w/ focus of duskiness, slightly greater than yesterday    LABS:                      8.9    12.60 )-----------( 219      ( 31 Oct 2021 06:57 )             25.4     10-31    136  |  105  |  18  ----------------------------<  79  3.6   |  23  |  0.52    Ca    7.9<L>      31 Oct 2021 06:57  Phos  1.4     10-31  Mg     1.70     10-31

## 2021-10-31 NOTE — PROGRESS NOTE ADULT - ASSESSMENT
Assessment:  81y Female with PMH of HTN, Raynaud's disease, rectal CA s/p LAR and diverting ileostomy, pacemaker, right cold leg on Eliquis, presenting with melena, found to have SBO from left inguinal hernia, now s/p EGD (Found to have bleeding duodenal ulcer) s/p hemostasis and reduction of SBO with mesh placement on 10/29.     Plan:   -  Assessment:  81y Female with PMH of HTN, Raynaud's disease, rectal CA s/p LAR and diverting ileostomy, pacemaker, right cold leg on Eliquis, presenting with melena, found to have SBO from left inguinal hernia, now s/p EGD (Found to have bleeding duodenal ulcer) s/p hemostasis and reduction of SBO with mesh placement on 10/29.     Plan:  - CLD  - protonix BID  - Pain control  - Trend H/H; Currently stable. Transfuse PRN >7.0  - Follow-up vascular surgery; re: further recs    B Team Surgery   [id number]

## 2021-11-01 LAB
ANION GAP SERPL CALC-SCNC: 10 MMOL/L — SIGNIFICANT CHANGE UP (ref 7–14)
BLD GP AB SCN SERPL QL: NEGATIVE — SIGNIFICANT CHANGE UP
BUN SERPL-MCNC: 13 MG/DL — SIGNIFICANT CHANGE UP (ref 7–23)
CALCIUM SERPL-MCNC: 8 MG/DL — LOW (ref 8.4–10.5)
CHLORIDE SERPL-SCNC: 103 MMOL/L — SIGNIFICANT CHANGE UP (ref 98–107)
CO2 SERPL-SCNC: 24 MMOL/L — SIGNIFICANT CHANGE UP (ref 22–31)
CREAT SERPL-MCNC: 0.47 MG/DL — LOW (ref 0.5–1.3)
GLUCOSE SERPL-MCNC: 82 MG/DL — SIGNIFICANT CHANGE UP (ref 70–99)
HCT VFR BLD CALC: 18.8 % — CRITICAL LOW (ref 34.5–45)
HCT VFR BLD CALC: 21.3 % — LOW (ref 34.5–45)
HCT VFR BLD CALC: 26.6 % — LOW (ref 34.5–45)
HGB BLD-MCNC: 6.5 G/DL — CRITICAL LOW (ref 11.5–15.5)
HGB BLD-MCNC: 7 G/DL — CRITICAL LOW (ref 11.5–15.5)
HGB BLD-MCNC: 9.1 G/DL — LOW (ref 11.5–15.5)
MAGNESIUM SERPL-MCNC: 1.9 MG/DL — SIGNIFICANT CHANGE UP (ref 1.6–2.6)
MCHC RBC-ENTMCNC: 31 PG — SIGNIFICANT CHANGE UP (ref 27–34)
MCHC RBC-ENTMCNC: 31.3 PG — SIGNIFICANT CHANGE UP (ref 27–34)
MCHC RBC-ENTMCNC: 32.3 PG — SIGNIFICANT CHANGE UP (ref 27–34)
MCHC RBC-ENTMCNC: 32.9 GM/DL — SIGNIFICANT CHANGE UP (ref 32–36)
MCHC RBC-ENTMCNC: 34.2 GM/DL — SIGNIFICANT CHANGE UP (ref 32–36)
MCHC RBC-ENTMCNC: 34.6 GM/DL — SIGNIFICANT CHANGE UP (ref 32–36)
MCV RBC AUTO: 91.4 FL — SIGNIFICANT CHANGE UP (ref 80–100)
MCV RBC AUTO: 93.5 FL — SIGNIFICANT CHANGE UP (ref 80–100)
MCV RBC AUTO: 94.2 FL — SIGNIFICANT CHANGE UP (ref 80–100)
NRBC # BLD: 0 /100 WBCS — SIGNIFICANT CHANGE UP
NRBC # FLD: 0 K/UL — SIGNIFICANT CHANGE UP
NRBC # FLD: 0.02 K/UL — HIGH
NRBC # FLD: 0.03 K/UL — HIGH
PHOSPHATE SERPL-MCNC: 3.1 MG/DL — SIGNIFICANT CHANGE UP (ref 2.5–4.5)
PLATELET # BLD AUTO: 193 K/UL — SIGNIFICANT CHANGE UP (ref 150–400)
PLATELET # BLD AUTO: 200 K/UL — SIGNIFICANT CHANGE UP (ref 150–400)
PLATELET # BLD AUTO: 206 K/UL — SIGNIFICANT CHANGE UP (ref 150–400)
POTASSIUM SERPL-MCNC: 4.5 MMOL/L — SIGNIFICANT CHANGE UP (ref 3.5–5.3)
POTASSIUM SERPL-SCNC: 4.5 MMOL/L — SIGNIFICANT CHANGE UP (ref 3.5–5.3)
RBC # BLD: 2.01 M/UL — LOW (ref 3.8–5.2)
RBC # BLD: 2.26 M/UL — LOW (ref 3.8–5.2)
RBC # BLD: 2.91 M/UL — LOW (ref 3.8–5.2)
RBC # FLD: 18.6 % — HIGH (ref 10.3–14.5)
RBC # FLD: 18.6 % — HIGH (ref 10.3–14.5)
RBC # FLD: 19.1 % — HIGH (ref 10.3–14.5)
RH IG SCN BLD-IMP: NEGATIVE — SIGNIFICANT CHANGE UP
SODIUM SERPL-SCNC: 137 MMOL/L — SIGNIFICANT CHANGE UP (ref 135–145)
WBC # BLD: 8.32 K/UL — SIGNIFICANT CHANGE UP (ref 3.8–10.5)
WBC # BLD: 8.65 K/UL — SIGNIFICANT CHANGE UP (ref 3.8–10.5)
WBC # BLD: 9.99 K/UL — SIGNIFICANT CHANGE UP (ref 3.8–10.5)
WBC # FLD AUTO: 8.32 K/UL — SIGNIFICANT CHANGE UP (ref 3.8–10.5)
WBC # FLD AUTO: 8.65 K/UL — SIGNIFICANT CHANGE UP (ref 3.8–10.5)
WBC # FLD AUTO: 9.99 K/UL — SIGNIFICANT CHANGE UP (ref 3.8–10.5)

## 2021-11-01 PROCEDURE — 99232 SBSQ HOSP IP/OBS MODERATE 35: CPT | Mod: GC

## 2021-11-01 PROCEDURE — 93923 UPR/LXTR ART STDY 3+ LVLS: CPT | Mod: 26

## 2021-11-01 RX ORDER — DEXTROSE MONOHYDRATE, SODIUM CHLORIDE, AND POTASSIUM CHLORIDE 50; .745; 4.5 G/1000ML; G/1000ML; G/1000ML
1000 INJECTION, SOLUTION INTRAVENOUS
Refills: 0 | Status: DISCONTINUED | OUTPATIENT
Start: 2021-11-01 | End: 2021-11-02

## 2021-11-01 RX ORDER — SUCRALFATE 1 G
1 TABLET ORAL
Refills: 0 | Status: DISCONTINUED | OUTPATIENT
Start: 2021-11-01 | End: 2021-11-11

## 2021-11-01 RX ADMIN — Medication 1 GRAM(S): at 18:31

## 2021-11-01 RX ADMIN — CHLORHEXIDINE GLUCONATE 1 APPLICATION(S): 213 SOLUTION TOPICAL at 05:24

## 2021-11-01 RX ADMIN — Medication 1 GRAM(S): at 23:08

## 2021-11-01 RX ADMIN — DEXTROSE MONOHYDRATE, SODIUM CHLORIDE, AND POTASSIUM CHLORIDE 50 MILLILITER(S): 50; .745; 4.5 INJECTION, SOLUTION INTRAVENOUS at 23:41

## 2021-11-01 RX ADMIN — Medication 650 MILLIGRAM(S): at 18:31

## 2021-11-01 RX ADMIN — PANTOPRAZOLE SODIUM 40 MILLIGRAM(S): 20 TABLET, DELAYED RELEASE ORAL at 18:31

## 2021-11-01 RX ADMIN — Medication 650 MILLIGRAM(S): at 19:12

## 2021-11-01 RX ADMIN — PANTOPRAZOLE SODIUM 40 MILLIGRAM(S): 20 TABLET, DELAYED RELEASE ORAL at 05:24

## 2021-11-01 RX ADMIN — Medication 1 GRAM(S): at 12:31

## 2021-11-01 NOTE — PROGRESS NOTE ADULT - SUBJECTIVE AND OBJECTIVE BOX
SURGERY DAILY PROGRESS NOTE:       SUBJECTIVE/ROS: No acute overnight events. Patient seen & examined at bedside this AM.     OBJECTIVE:  Vital Signs Last 24 Hrs  T(C): 37.1 (31 Oct 2021 21:53), Max: 37.2 (31 Oct 2021 06:58)  T(F): 98.7 (31 Oct 2021 21:53), Max: 99 (31 Oct 2021 06:58)  HR: 96 (31 Oct 2021 21:53) (70 - 98)  BP: 111/57 (31 Oct 2021 21:53) (109/83 - 138/65)  BP(mean): --  RR: 18 (31 Oct 2021 21:53) (17 - 18)  SpO2: 97% (31 Oct 2021 21:53) (93% - 98%)    I&O's Detail    30 Oct 2021 07:01  -  31 Oct 2021 07:00  --------------------------------------------------------  IN:    dextrose 5% + sodium chloride 0.45%: 100 mL    Oral Fluid: 365 mL  Total IN: 465 mL    OUT:    Colostomy (mL): 800 mL    Voided (mL): 1501 mL  Total OUT: 2301 mL    Total NET: -1836 mL      31 Oct 2021 07:01  -  01 Nov 2021 00:38  --------------------------------------------------------  IN:  Total IN: 0 mL    OUT:    Colostomy (mL): 725 mL    Voided (mL): 600 mL  Total OUT: 1325 mL    Total NET: -1325 mL    General Appearance:  NAD  Respiratory: Unlabored breathing  Abdomen: Soft, appropriate incisional tenderness, incision c/d/i  ostomy: ostomy with dark bloody output  Ext: R foot cool but improved from previous days; L DP & PT both palpable; R venous PT signal; R arterial DP signal. Mobility and sensation is preserved. R great toe w/ focus of duskiness, slightly greater than yesterday    LABS:                                 8.9    12.60 )-----------( 219      ( 31 Oct 2021 06:57 )             25.4   10-31    136  |  105  |  18  ----------------------------<  79  3.6   |  23  |  0.52    Ca    7.9<L>      31 Oct 2021 06:57  Phos  1.4     10-31  Mg     1.70     10-31                               SURGERY DAILY PROGRESS NOTE:       SUBJECTIVE/ROS: No acute overnight events. Patient seen & examined at bedside this AM.     OBJECTIVE:  Vital Signs Last 24 Hrs  T(C): 37.1 (31 Oct 2021 21:53), Max: 37.2 (31 Oct 2021 06:58)  T(F): 98.7 (31 Oct 2021 21:53), Max: 99 (31 Oct 2021 06:58)  HR: 96 (31 Oct 2021 21:53) (70 - 98)  BP: 111/57 (31 Oct 2021 21:53) (109/83 - 138/65)  BP(mean): --  RR: 18 (31 Oct 2021 21:53) (17 - 18)  SpO2: 97% (31 Oct 2021 21:53) (93% - 98%)    I&O's Detail    30 Oct 2021 07:01  -  31 Oct 2021 07:00  --------------------------------------------------------  IN:    dextrose 5% + sodium chloride 0.45%: 100 mL    Oral Fluid: 365 mL  Total IN: 465 mL    OUT:    Colostomy (mL): 800 mL    Voided (mL): 1501 mL  Total OUT: 2301 mL    Total NET: -1836 mL      31 Oct 2021 07:01  -  01 Nov 2021 00:38  --------------------------------------------------------  IN:  Total IN: 0 mL    OUT:    Colostomy (mL): 725 mL    Voided (mL): 600 mL  Total OUT: 1325 mL    Total NET: -1325 mL    General Appearance:  NAD  Respiratory: Unlabored breathing  Abdomen: Soft, appropriate incisional tenderness, incision c/d/i  ostomy: ostomy with dark bloody output, no gas  Ext: R foot cool but improved from previous days; L DP & PT both palpable; R venous PT signal; R arterial DP signal. Mobility and sensation is preserved. R great toe w/ focus of duskiness, slightly greater than yesterday    LABS:                                 8.9    12.60 )-----------( 219      ( 31 Oct 2021 06:57 )             25.4   10-31    136  |  105  |  18  ----------------------------<  79  3.6   |  23  |  0.52    Ca    7.9<L>      31 Oct 2021 06:57  Phos  1.4     10-31  Mg     1.70     10-31

## 2021-11-01 NOTE — PROGRESS NOTE ADULT - SUBJECTIVE AND OBJECTIVE BOX
Chief Complaint:  Patient is a 81y old  Female who presents with a chief complaint of GI bleed, SBO 2/2 L inguinal hernia (01 Nov 2021 05:59)      Reason for consult: melena    Interval Events:   Hgb dropped to 6.5 from 8.9  HDS  Dark brown-black liquid/mushy stool in colostomy bag    Hospital Medications:  acetaminophen     Tablet .. 650 milliGRAM(s) Oral every 6 hours PRN  chlorhexidine 4% Liquid 1 Application(s) Topical <User Schedule>  pantoprazole  Injectable 40 milliGRAM(s) IV Push every 12 hours  sucralfate 1 Gram(s) Oral four times a day      ROS:   Complete and normal except as mentioned above.    PHYSICAL EXAM:   Vital Signs:  Vital Signs Last 24 Hrs  T(C): 36.7 (01 Nov 2021 09:26), Max: 37.1 (31 Oct 2021 21:53)  T(F): 98.1 (01 Nov 2021 09:26), Max: 98.7 (31 Oct 2021 21:53)  HR: 87 (01 Nov 2021 09:26) (70 - 98)  BP: 130/66 (01 Nov 2021 09:26) (109/83 - 130/68)  BP(mean): --  RR: 18 (01 Nov 2021 09:26) (17 - 18)  SpO2: 98% (01 Nov 2021 09:26) (93% - 98%)  Daily     Daily     GENERAL: no acute distress  NEURO: alert  HEENT: anicteric sclera, no conjunctival pallor appreciated  CHEST: no respiratory distress, no accessory muscle use  CARDIAC: regular rate, +S1/S2  ABDOMEN: ostomy with Dark brown-black liquid/mushy stool, soft, nondistended, nontender, no rebound or guarding  EXTREMITIES: warm, well perfused, no edema  SKIN: no lesions noted    LABS: reviewed                        7.0    8.65  )-----------( 206      ( 01 Nov 2021 09:04 )             21.3     11-01    137  |  103  |  13  ----------------------------<  82  4.5   |  24  |  0.47<L>    Ca    8.0<L>      01 Nov 2021 07:14  Phos  3.1     11-01  Mg     1.90     11-01          Interval Diagnostic Studies: see sunrise for full report

## 2021-11-01 NOTE — PROGRESS NOTE ADULT - ASSESSMENT
81F w/ Raynaud's recent LAR with diverting ostomy for rectal cancer c/b post op R cold leg started on Eliquis now here with acute GI bleed and SBO 2/2 L inguinal hernia. Vascular surgery following for R cold foot.    Plan:   - Optimize medical management for patient (ASA/stain, HTN control, DM control)  - Potential plan for AC pending clearance by GI (potentially Monday)  - Recommend avoidance of beta blockers as this can exacerbate Raynaud's  - Warming blanket to R foot  - Continue care per primary team    Vascular Surgery, [id number]

## 2021-11-01 NOTE — PROGRESS NOTE ADULT - ASSESSMENT
DAVID QUEEN is a 81y Female with a PPM in place, unclear why, h/o rectal CA s/p rectal surgery with colostomy about 2 weeks prior to presentation at [facility] c/b ?ischemic leg with RLE thrombus started on Eliquis who initially presented to outside hospital with reports coffee ground emesis/melena from colostomy, GI consulted for further evaluation.     Impression:  #. Coffee ground emesis/melena in colostomy s/p EGD with oozing duodenal ulcer s/p epi/bicap 10/28   #. High grade SBO c/b possible incarcerated hernia s/p Ex lap repair of incarcerated left inguinal hernia with mesh  #. LE Thrombus/PVD  - OSH CTA 10/28 negative for active extravasation     Recommendations:  -  - c/w PPI IV BID  - Trend Hgb, Transfuse if Hgb < 8 considering cardiac history  - Ensure adequate hydration and electrolyte repletion   - At increased risk for rebleeding with AC, consider holding, but defer final decision to primary, weigh risk vs benefit DAVID QUEEN is a 81y Female with a PPM in place, unclear why, h/o rectal CA s/p rectal surgery with colostomy about 2 weeks prior to presentation at [facility] c/b ?ischemic leg with RLE thrombus started on Eliquis who initially presented to outside hospital with reports coffee ground emesis/melena from colostomy, GI consulted for further evaluation.     Impression:  #. Coffee ground emesis/melena in colostomy s/p EGD with oozing duodenal ulcer s/p epi/bicap 10/28   #. High grade SBO c/b possible incarcerated hernia s/p Ex lap repair of incarcerated left inguinal hernia with mesh  #. LE Thrombus/PVD  - OSH CTA 10/28 negative for active extravasation     Recommendations:  - May benefit from repeat EGD to evaluate for additional bleeding, but needs to be medically optimized first   - Plan for EGD tomorrow  - Repeat covid19 pcr, needs test within 72 hrs prior to EGD  - Liquid diet today  - NPO after midnight  - Give pRBCs today and recheck Hgb today  - c/w PPI IV BID  - Trend Hgb, Transfuse if Hgb < 8 considering cardiac history  - Ensure adequate hydration and electrolyte repletion   - At increased risk for rebleeding with AC, consider holding, but defer final decision to primary, weigh risk vs benefit    Thank you for involving us in this patient's care.    Seen and discussed with Attending, Dr. Ramos.    Madison Nunez MD  Gastroenterology/Hepatology Fellow, PGY-V    NON-URGENT CONSULTS:  Please email [email or web address]  AT NIGHT AND ON WEEKENDS:  Contact on-call GI fellow via answering service ([phone]) from 5pm-8am and on weekends/holidays  MONDAY-FRIDAY 8AM-5PM:  Pager# [phone] ([facility])  GI Phone# [phone] ([facility]) DAVID QUEEN is a 81y Female with a PPM in place, unclear why, h/o rectal CA s/p rectal surgery with colostomy about 2 weeks prior to presentation at [facility] c/b ?ischemic leg with RLE thrombus started on Eliquis who initially presented to outside hospital with reports coffee ground emesis/melena from colostomy, GI consulted for further evaluation.     Impression:  #. Coffee ground emesis/melena in colostomy s/p EGD with oozing duodenal ulcer s/p epi/bicap 10/28   #. High grade SBO c/b possible incarcerated hernia s/p Ex lap repair of incarcerated left inguinal hernia with mesh  #. LE Thrombus/PVD  - OSH CTA 10/28 negative for active extravasation     Recommendations:  - May benefit from repeat EGD to evaluate for additional bleeding, but needs to be medically optimized first; tentatively plan for tomorrow  - Repeat covid19 pcr, needs test within 72 hrs prior to EGD  - Liquid diet today  - NPO after midnight  - Give pRBCs today and recheck Hgb today  - c/w PPI IV BID  - Trend Hgb, Transfuse if Hgb < 8 considering cardiac history  - Ensure adequate hydration and electrolyte repletion   - At increased risk for rebleeding with AC, consider holding, but defer final decision to primary, weigh risk vs benefit    Thank you for involving us in this patient's care.    Seen and discussed with Attending, Dr. Ramos.    Madison Nunez MD  Gastroenterology/Hepatology Fellow, PGY-V    NON-URGENT CONSULTS:  Please email [email or web address]  [email or web address]  AT NIGHT AND ON WEEKENDS:  Contact on-call GI fellow via answering service ([phone]) from 5pm-8am and on weekends/holidays  MONDAY-FRIDAY 8AM-5PM:  Pager# [phone] ([facility])  GI Phone# [phone] ([facility])

## 2021-11-01 NOTE — PROGRESS NOTE ADULT - ASSESSMENT
Assessment:  81y Female with PMH of HTN, Raynaud's disease, rectal CA s/p LAR and diverting ileostomy, pacemaker, right cold leg on Eliquis, presenting with melena, found to have SBO from left inguinal hernia, now s/p EGD (Found to have bleeding duodenal ulcer) s/p hemostasis and reduction of SBO with mesh placement on 10/29.     Plan:  - CLD  - protonix BID  - Pain control  - Monitor Vital signs, Is/Os  - Trend H/H; Currently stable. Transfuse PRN >7.0  - Home beta blocker held per vascular surgery recs    B Team Surgery   [id number]   Assessment:  81y Female with PMH of HTN, Raynaud's disease, rectal CA s/p LAR and diverting ileostomy, pacemaker, right cold leg on Eliquis, presenting with melena, found to have SBO from left inguinal hernia, now s/p EGD (Found to have bleeding duodenal ulcer) s/p hemostasis and reduction of SBO with mesh placement on 10/29.     Plan:  - Continue CLD  - Will discuss resuming AC with GI team  - protonix BID  - Pain control  - Monitor Vital signs, Is/Os  - Trend H/H; Currently stable. Transfuse PRN >7.0  - Home beta blocker held per vascular surgery recs, will resume if tachycardic    B Team Surgery   [id number]

## 2021-11-01 NOTE — PROGRESS NOTE ADULT - SUBJECTIVE AND OBJECTIVE BOX
HPI:  10/29 EGD w/ bleeding duodenal ulcer that was coagulated and injected with epinephrine; repaired incarcerated L direct inguinal hernia    Subjective:   Patient examined at bedside this AM  Transfer from SICU yesterday    Objective:  Vital Signs  T(C): 36.7 (11-01 @ 05:21), Max: 37.2 (10-31 @ 06:58)  HR: 87 (11-01 @ 05:21) (70 - 98)  BP: 126/57 (11-01 @ 05:21) (109/83 - 138/65)  RR: 18 (11-01 @ 02:11) (17 - 18)  SpO2: 98% (11-01 @ 02:11) (93% - 98%)  10-30-21 @ 07:01  -  10-31-21 @ 07:00  --------------------------------------------------------  IN:  Total IN: 0 mL    OUT:    Colostomy (mL): 800 mL    Voided (mL): 1501 mL  Total OUT: 2301 mL    Total NET: -2301 mL      10-31-21 @ 07:01  -  11-01-21 @ 05:59  --------------------------------------------------------  IN:  Total IN: 0 mL    OUT:    Colostomy (mL): 825 mL    Voided (mL): 1000 mL  Total OUT: 1825 mL    Total NET: -1825 mL      Physical Exam:  General Appearance:  NAD  Respiratory: Unlabored breathing  Abdomen: Soft, nontender, laparotomy incision c/d/i  ostomy: ostomy with dark bloody output  Ext: R foot cool but improved from previous days; L DP & PT both palpable; R venous PT signal; R arterial DP signal. Mobility and sensation is conserved. R great toe w/ focus of duskiness    Labs:                        8.9    12.60 )-----------( 219      ( 31 Oct 2021 06:57 )             25.4   10-31    136  |  105  |  18  ----------------------------<  79  3.6   |  23  |  0.52    Ca    7.9<L>      31 Oct 2021 06:57  Phos  1.4     10-31  Mg     1.70     10-31      CAPILLARY BLOOD GLUCOSE          Microbiology:      Medications:   MEDICATIONS  (STANDING):  chlorhexidine 4% Liquid 1 Application(s) Topical <User Schedule>  pantoprazole  Injectable 40 milliGRAM(s) IV Push every 12 hours    MEDICATIONS  (PRN):  acetaminophen     Tablet .. 650 milliGRAM(s) Oral every 6 hours PRN Temp greater or equal to 38C (100.4F), Mild Pain (1 - 3)

## 2021-11-01 NOTE — PROGRESS NOTE ADULT - ATTENDING COMMENTS
Still with dark ostomy output.   Hgb this AM 6.5 -> 7.0 (without transfusion). BUN/Cr improving.   Pending pRBC transfusion.   Tentatively plan for repeat EGD tomorrow given ongoing melenic output and dropping Hgb.  Continue PPI.

## 2021-11-02 LAB
ANION GAP SERPL CALC-SCNC: 8 MMOL/L — SIGNIFICANT CHANGE UP (ref 7–14)
BUN SERPL-MCNC: 7 MG/DL — SIGNIFICANT CHANGE UP (ref 7–23)
CALCIUM SERPL-MCNC: 8 MG/DL — LOW (ref 8.4–10.5)
CHLORIDE SERPL-SCNC: 102 MMOL/L — SIGNIFICANT CHANGE UP (ref 98–107)
CO2 SERPL-SCNC: 24 MMOL/L — SIGNIFICANT CHANGE UP (ref 22–31)
CREAT SERPL-MCNC: 0.49 MG/DL — LOW (ref 0.5–1.3)
GLUCOSE SERPL-MCNC: 96 MG/DL — SIGNIFICANT CHANGE UP (ref 70–99)
H.PYLORI ANTIBODY IGA: 70.5 UNITS — HIGH
HCT VFR BLD CALC: 23.6 % — LOW (ref 34.5–45)
HGB BLD-MCNC: 8 G/DL — LOW (ref 11.5–15.5)
MAGNESIUM SERPL-MCNC: 1.8 MG/DL — SIGNIFICANT CHANGE UP (ref 1.6–2.6)
MCHC RBC-ENTMCNC: 31.1 PG — SIGNIFICANT CHANGE UP (ref 27–34)
MCHC RBC-ENTMCNC: 33.9 GM/DL — SIGNIFICANT CHANGE UP (ref 32–36)
MCV RBC AUTO: 91.8 FL — SIGNIFICANT CHANGE UP (ref 80–100)
NRBC # BLD: 0 /100 WBCS — SIGNIFICANT CHANGE UP
NRBC # FLD: 0.02 K/UL — HIGH
PHOSPHATE SERPL-MCNC: 2.7 MG/DL — SIGNIFICANT CHANGE UP (ref 2.5–4.5)
PLATELET # BLD AUTO: 205 K/UL — SIGNIFICANT CHANGE UP (ref 150–400)
POTASSIUM SERPL-MCNC: 4.2 MMOL/L — SIGNIFICANT CHANGE UP (ref 3.5–5.3)
POTASSIUM SERPL-SCNC: 4.2 MMOL/L — SIGNIFICANT CHANGE UP (ref 3.5–5.3)
RBC # BLD: 2.57 M/UL — LOW (ref 3.8–5.2)
RBC # FLD: 19 % — HIGH (ref 10.3–14.5)
SARS-COV-2 RNA SPEC QL NAA+PROBE: SIGNIFICANT CHANGE UP
SODIUM SERPL-SCNC: 134 MMOL/L — LOW (ref 135–145)
WBC # BLD: 7.6 K/UL — SIGNIFICANT CHANGE UP (ref 3.8–10.5)
WBC # FLD AUTO: 7.6 K/UL — SIGNIFICANT CHANGE UP (ref 3.8–10.5)

## 2021-11-02 PROCEDURE — 93280 PM DEVICE PROGR EVAL DUAL: CPT | Mod: 26

## 2021-11-02 PROCEDURE — 43255 EGD CONTROL BLEEDING ANY: CPT | Mod: GC

## 2021-11-02 RX ORDER — SODIUM CHLORIDE 9 MG/ML
1000 INJECTION, SOLUTION INTRAVENOUS
Refills: 0 | Status: DISCONTINUED | OUTPATIENT
Start: 2021-11-02 | End: 2021-11-03

## 2021-11-02 RX ADMIN — Medication 1 GRAM(S): at 23:36

## 2021-11-02 RX ADMIN — Medication 1 GRAM(S): at 05:27

## 2021-11-02 RX ADMIN — PANTOPRAZOLE SODIUM 40 MILLIGRAM(S): 20 TABLET, DELAYED RELEASE ORAL at 05:26

## 2021-11-02 RX ADMIN — PANTOPRAZOLE SODIUM 40 MILLIGRAM(S): 20 TABLET, DELAYED RELEASE ORAL at 18:31

## 2021-11-02 RX ADMIN — Medication 1 GRAM(S): at 18:31

## 2021-11-02 RX ADMIN — Medication 1 GRAM(S): at 11:21

## 2021-11-02 NOTE — CHART NOTE - NSCHARTNOTEFT_GEN_A_CORE
Post EGD Check    Patient is several hours post EGD with intervention (epinephrine injection, cautery, clip application) by GI. Recovering well, pain controlled, tolerating CLD.     Vitals    T(C): 36.7 (11-02-21 @ 21:05), Max: 36.8 (11-01-21 @ 22:11)  HR: 64 (11-02-21 @ 21:05) (64 - 98)  BP: 114/75 (11-02-21 @ 21:05) (114/75 - 142/88)  RR: 20 (11-02-21 @ 21:05) (16 - 22)  SpO2: 100% (11-02-21 @ 21:05) (96% - 100%)      11-01 @ 07:01  -  11-02 @ 07:00  --------------------------------------------------------  IN:    dextrose 5% + sodium chloride 0.45% w/ Additives: 400 mL    Oral Fluid: 270 mL  Total IN: 670 mL    OUT:    Colostomy (mL): 1050 mL    Voided (mL): 2700 mL  Total OUT: 3750 mL    Total NET: -3080 mL      11-02 @ 07:01  -  11-02 @ 21:54  --------------------------------------------------------  IN:    dextrose 5% + sodium chloride 0.45% w/ Additives: 200 mL  Total IN: 200 mL    OUT:    Colostomy (mL): 50 mL    Voided (mL): 600 mL  Total OUT: 650 mL    Total NET: -450 mL          Labs                        8.0    7.60  )-----------( 205      ( 02 Nov 2021 07:41 )             23.6       CBC Full  -  ( 02 Nov 2021 07:41 )  WBC Count : 7.60 K/uL  Hemoglobin : 8.0 g/dL  Hematocrit : 23.6 %  Platelet Count - Automated : 205 K/uL  Mean Cell Volume : 91.8 fL  Mean Cell Hemoglobin : 31.1 pg  Mean Cell Hemoglobin Concentration : 33.9 gm/dL  Auto Neutrophil # : x  Auto Lymphocyte # : x  Auto Monocyte # : x  Auto Eosinophil # : x  Auto Basophil # : x  Auto Neutrophil % : x  Auto Lymphocyte % : x  Auto Monocyte % : x  Auto Eosinophil % : x  Auto Basophil % : x      Physical Exam  General:  thin, NAD, lying in bed  Abdomen: soft, appropriate incisional tenderness, c/d/i, ostomy viable gas in bag      Patient is a 81y old Female s/p EGD.     Plan:  - CLD as tolerated  - AC on hold for bleeding   - protonix BID  - Pain control  - Monitor Vital signs, Is/Os  - Home beta blocker held per vascular surgery recs, will resume if tachycardic Post EGD Check    Patient is several hours post EGD with intervention (epinephrine injection, cautery, clip application) by GI. Recovering well, pain controlled, tolerating CLD.     Vitals    T(C): 36.7 (11-02-21 @ 21:05), Max: 36.8 (11-01-21 @ 22:11)  HR: 64 (11-02-21 @ 21:05) (64 - 98)  BP: 114/75 (11-02-21 @ 21:05) (114/75 - 142/88)  RR: 20 (11-02-21 @ 21:05) (16 - 22)  SpO2: 100% (11-02-21 @ 21:05) (96% - 100%)      11-01 @ 07:01  -  11-02 @ 07:00  --------------------------------------------------------  IN:    dextrose 5% + sodium chloride 0.45% w/ Additives: 400 mL    Oral Fluid: 270 mL  Total IN: 670 mL    OUT:    Colostomy (mL): 1050 mL    Voided (mL): 2700 mL  Total OUT: 3750 mL    Total NET: -3080 mL      11-02 @ 07:01  -  11-02 @ 21:54  --------------------------------------------------------  IN:    dextrose 5% + sodium chloride 0.45% w/ Additives: 200 mL  Total IN: 200 mL    OUT:    Colostomy (mL): 50 mL    Voided (mL): 600 mL  Total OUT: 650 mL    Total NET: -450 mL          Labs                        8.0    7.60  )-----------( 205      ( 02 Nov 2021 07:41 )             23.6       CBC Full  -  ( 02 Nov 2021 07:41 )  WBC Count : 7.60 K/uL  Hemoglobin : 8.0 g/dL  Hematocrit : 23.6 %  Platelet Count - Automated : 205 K/uL  Mean Cell Volume : 91.8 fL  Mean Cell Hemoglobin : 31.1 pg  Mean Cell Hemoglobin Concentration : 33.9 gm/dL  Auto Neutrophil # : x  Auto Lymphocyte # : x  Auto Monocyte # : x  Auto Eosinophil # : x  Auto Basophil # : x  Auto Neutrophil % : x  Auto Lymphocyte % : x  Auto Monocyte % : x  Auto Eosinophil % : x  Auto Basophil % : x      Physical Exam  General:  thin, NAD, lying in bed  Abdomen: soft, appropriate incisional tenderness, c/d/i, ostomy with gas and dark stool in bag      Patient is a 81y old Female s/p EGD.     Plan:  - CLD as tolerated  - AC on hold for bleeding   - protonix BID  - Pain control  - Monitor Vital signs, Is/Os  - Home beta blocker held per vascular surgery recs, will resume if tachycardic

## 2021-11-02 NOTE — PROCEDURE NOTE - ADDITIONAL PROCEDURE DETAILS
Pacer dependent  -Reprogrammed to DOO from 60 to 70   Please call EP to re-program back to DDD after GI procedure

## 2021-11-02 NOTE — PROGRESS NOTE ADULT - ASSESSMENT
81F w/ Rayisrraelud's recent LAR with diverting ostomy for rectal cancer c/b post op R cold leg started on Eliquis now here with acute GI bleed and SBO 2/2 L inguinal hernia. Vascular surgery following for R cold foot.    Plan:   - Optimize medical management for patient (ASA/stain, HTN control, DM control)  - Plan for AC pending clearance by GI   - Resume beta-blocker per primary team   - Warming blanket to R foot  - Continue care per primary team    Vascular Surgery  [id number] 81F w/ Raynaud's recent LAR with diverting ostomy for rectal cancer c/b post op R cold leg started on Eliquis now here with acute GI bleed and SBO 2/2 L inguinal hernia. Vascular surgery following for R cold foot.    Plan:   - Optimize medical management for patient (ASA/stain, HTN control, DM control)  - Plan for AC pending clearance by GI   - Resume beta-blocker per primary team   - Consuelo hugger and nitro paste to R foot  - Continue care per primary team    Vascular Surgery  [id number]

## 2021-11-02 NOTE — PROGRESS NOTE ADULT - ASSESSMENT
Assessment:  81y Female with PMH of HTN, Raynaud's disease, rectal CA s/p LAR and diverting ileostomy, pacemaker, right cold leg on Eliquis, presenting with melena, found to have SBO from left inguinal hernia, now s/p EGD (Found to have bleeding duodenal ulcer) s/p hemostasis and reduction of SBO with mesh placement on 10/29.     Plan:  - Continue CLD, NPO @[place] for EGD  - AC per discussion with GI  - protonix BID  - Pain control  - Monitor Vital signs, Is/Os  - Trend H/H; post transfusion CBC appropriate response, F/u AM  - Home beta blocker held per vascular surgery recs, will resume if tachycardic    B Team Surgery   [id number]   Assessment:  81y Female with PMH of HTN, Raynaud's disease, rectal CA s/p LAR and diverting ileostomy, pacemaker, right cold leg on Eliquis, presenting with melena, found to have SBO from left inguinal hernia, now s/p EGD (Found to have bleeding duodenal ulcer) s/p hemostasis and reduction of SBO with mesh placement on 10/29.     Plan:  - NPO for EGD today   - AC on hold for bleeding   - protonix BID  - Pain control  - Monitor Vital signs, Is/Os  - Trend H/H; post transfusion CBC appropriate response  - Home beta blocker held per vascular surgery recs, will resume if tachycardic      B Team Surgery   [id number]

## 2021-11-02 NOTE — PROGRESS NOTE ADULT - SUBJECTIVE AND OBJECTIVE BOX
Subjective:   10/29 EGD w/ bleeding duodenal ulcer that was coagulated and injected with epinephrine; repaired incarcerated L direct inguinal hernia  Patient examined at bedside this AM  Got 1U pRBC with appropriate response  Plan for repeat EGD today, NPO since midnight     Objective:  Vital Signs  T(C): 36.8 (11-02 @ 05:03), Max: 37.5 (11-01 @ 14:36)  HR: 79 (11-02 @ 05:03) (79 - 95)  BP: 130/78 (11-02 @ 05:03) (122/61 - 142/88)  RR: 17 (11-02 @ 05:03) (16 - 18)  SpO2: 97% (11-02 @ 05:03) (95% - 99%)  11-01-21 @ 07:01  -  11-02-21 @ 07:00  --------------------------------------------------------  IN:  Total IN: 0 mL    OUT:    Colostomy (mL): 1050 mL    Voided (mL): 2700 mL  Total OUT: 3750 mL    Total NET: -3750 mL      Physical Exam:  General Appearance:  NAD  Respiratory: Unlabored breathing  Abdomen: Soft, nontender, laparotomy incision c/d/i  ostomy: ostomy with dark bloody output  Ext: R foot cool but improved from previous days; L DP & PT both palpable; R venous PT signal; R arterial DP signal. Mobility and sensation is conserved. R great toe w/ focus of duskiness    Labs:                        8.0    7.60  )-----------( 205      ( 02 Nov 2021 07:41 )             23.6   11-02    134<L>  |  102  |  7   ----------------------------<  96  4.2   |  24  |  0.49<L>    Ca    8.0<L>      02 Nov 2021 07:41  Phos  2.7     11-02  Mg     1.80     11-02      CAPILLARY BLOOD GLUCOSE          Microbiology:      Medications:   MEDICATIONS  (STANDING):  chlorhexidine 4% Liquid 1 Application(s) Topical <User Schedule>  dextrose 5% + sodium chloride 0.45% with potassium chloride 20 mEq/L 1000 milliLiter(s) (50 mL/Hr) IV Continuous <Continuous>  pantoprazole  Injectable 40 milliGRAM(s) IV Push every 12 hours  sucralfate 1 Gram(s) Oral four times a day    MEDICATIONS  (PRN):  acetaminophen     Tablet .. 650 milliGRAM(s) Oral every 6 hours PRN Temp greater or equal to 38C (100.4F), Mild Pain (1 - 3)

## 2021-11-02 NOTE — PROGRESS NOTE ADULT - SUBJECTIVE AND OBJECTIVE BOX
SURGERY DAILY PROGRESS NOTE:     SUBJECTIVE/ROS: Patient received 1u pRBC, cbc after transfusion responded appropriately. No acute events overnight, seen and examined at bedside this AM.     OBJECTIVE:  Vital Signs Last 24 Hrs  T(C): 36.8 (01 Nov 2021 22:11), Max: 37.5 (01 Nov 2021 14:36)  T(F): 98.2 (01 Nov 2021 22:11), Max: 99.5 (01 Nov 2021 14:36)  HR: 86 (01 Nov 2021 22:11) (86 - 95)  BP: 122/61 (01 Nov 2021 22:11) (122/61 - 136/91)  BP(mean): --  RR: 18 (01 Nov 2021 22:11) (16 - 18)  SpO2: 96% (01 Nov 2021 22:11) (95% - 99%)    I&O's Detail    31 Oct 2021 07:01  -  01 Nov 2021 07:00  --------------------------------------------------------  IN:  Total IN: 0 mL    OUT:    Colostomy (mL): 825 mL    Voided (mL): 1000 mL  Total OUT: 1825 mL    Total NET: -1825 mL      01 Nov 2021 07:01  -  02 Nov 2021 00:32  --------------------------------------------------------  IN:    Oral Fluid: 120 mL  Total IN: 120 mL    OUT:    Colostomy (mL): 450 mL    Voided (mL): 1900 mL  Total OUT: 2350 mL    Total NET: -2230 mL    Physical Exam:  General Appearance:  NAD  Respiratory: Unlabored breathing  Abdomen: Soft, appropriate incisional tenderness, incision c/d/i  ostomy: ostomy with dark bloody output, no gas  Ext: R foot cool but improved from previous days; L DP & PT both palpable; R venous PT signal; R arterial DP signal. Mobility and sensation is preserved. R great toe w/ focus of duskiness    LABS:                                 9.1    9.99  )-----------( 193      ( 01 Nov 2021 18:13 )             26.6   11-01    137  |  103  |  13  ----------------------------<  82  4.5   |  24  |  0.47<L>    Ca    8.0<L>      01 Nov 2021 07:14  Phos  3.1     11-01  Mg     1.90     11-01                               SURGERY DAILY PROGRESS NOTE:     SUBJECTIVE:   Pt seen and examined on AM rounds. Patient received 1u pRBC, cbc after transfusion responded appropriately. No acute events overnight.    OBJECTIVE:  Vital Signs Last 24 Hrs  T(C): 36.8 (11-02-21 @ 05:03), Max: 37.5 (11-01-21 @ 14:36)  HR: 79 (11-02-21 @ 05:03) (79 - 95)  BP: 130/78 (11-02-21 @ 05:03) (122/61 - 142/88)  ABP: --  ABP(mean): --  RR: 17 (11-02-21 @ 05:03) (16 - 18)  SpO2: 97% (11-02-21 @ 05:03) (95% - 99%)  Wt(kg): --  CVP(mm Hg): --      11-01 @ 07:01  -  11-02 @ 07:00  --------------------------------------------------------  IN:    dextrose 5% + sodium chloride 0.45% w/ Additives: 400 mL    Oral Fluid: 270 mL  Total IN: 670 mL    OUT:    Colostomy (mL): 1050 mL    Voided (mL): 2700 mL  Total OUT: 3750 mL    Total NET: -3080 mL      Physical Exam:  General Appearance:  NAD  Respiratory: Unlabored breathing  Abdomen: Soft, appropriate incisional tenderness, incision c/d/i  ostomy: ostomy with dark bloody output, no gas  Ext: R foot cool but improved from previous days; L DP & PT both palpable; R venous PT signal; R arterial DP signal. Mobility and sensation is preserved. R great toe w/ focus of duskiness      LABS:      CBC (11-02 @ 07:41)                              8.0<L>                         7.60    )----------------(  205        --    % Neutrophils, --    % Lymphocytes, ANC: --                                  23.6<L>  CBC (11-01 @ 18:13)                              9.1<L>                         9.99    )----------------(  193        --    % Neutrophils, --    % Lymphocytes, ANC: --                                  26.6<L>    BMP (11-01 @ 07:14)             137     |  103     |  13    		Ca++ --      Ca 8.0<L>             ---------------------------------( 82    		Mg 1.90               4.5     |  24      |  0.47<L>			Ph 3.1

## 2021-11-03 ENCOUNTER — TRANSCRIPTION ENCOUNTER (OUTPATIENT)
Age: 81
End: 2021-11-03

## 2021-11-03 LAB
ANION GAP SERPL CALC-SCNC: 8 MMOL/L — SIGNIFICANT CHANGE UP (ref 7–14)
BUN SERPL-MCNC: 8 MG/DL — SIGNIFICANT CHANGE UP (ref 7–23)
CALCIUM SERPL-MCNC: 8.2 MG/DL — LOW (ref 8.4–10.5)
CHLORIDE SERPL-SCNC: 101 MMOL/L — SIGNIFICANT CHANGE UP (ref 98–107)
CO2 SERPL-SCNC: 24 MMOL/L — SIGNIFICANT CHANGE UP (ref 22–31)
CREAT SERPL-MCNC: 0.5 MG/DL — SIGNIFICANT CHANGE UP (ref 0.5–1.3)
GLUCOSE SERPL-MCNC: 105 MG/DL — HIGH (ref 70–99)
HCT VFR BLD CALC: 24 % — LOW (ref 34.5–45)
HGB BLD-MCNC: 7.8 G/DL — LOW (ref 11.5–15.5)
MAGNESIUM SERPL-MCNC: 1.7 MG/DL — SIGNIFICANT CHANGE UP (ref 1.6–2.6)
MCHC RBC-ENTMCNC: 30.8 PG — SIGNIFICANT CHANGE UP (ref 27–34)
MCHC RBC-ENTMCNC: 32.5 GM/DL — SIGNIFICANT CHANGE UP (ref 32–36)
MCV RBC AUTO: 94.9 FL — SIGNIFICANT CHANGE UP (ref 80–100)
NRBC # BLD: 0 /100 WBCS — SIGNIFICANT CHANGE UP
NRBC # FLD: 0.02 K/UL — HIGH
PHOSPHATE SERPL-MCNC: 2.2 MG/DL — LOW (ref 2.5–4.5)
PLATELET # BLD AUTO: 226 K/UL — SIGNIFICANT CHANGE UP (ref 150–400)
POTASSIUM SERPL-MCNC: 3.8 MMOL/L — SIGNIFICANT CHANGE UP (ref 3.5–5.3)
POTASSIUM SERPL-SCNC: 3.8 MMOL/L — SIGNIFICANT CHANGE UP (ref 3.5–5.3)
RBC # BLD: 2.53 M/UL — LOW (ref 3.8–5.2)
RBC # FLD: 20 % — HIGH (ref 10.3–14.5)
SODIUM SERPL-SCNC: 133 MMOL/L — LOW (ref 135–145)
WBC # BLD: 10.02 K/UL — SIGNIFICANT CHANGE UP (ref 3.8–10.5)
WBC # FLD AUTO: 10.02 K/UL — SIGNIFICANT CHANGE UP (ref 3.8–10.5)

## 2021-11-03 PROCEDURE — 99232 SBSQ HOSP IP/OBS MODERATE 35: CPT | Mod: GC

## 2021-11-03 RX ORDER — SODIUM,POTASSIUM PHOSPHATES 278-250MG
1 POWDER IN PACKET (EA) ORAL
Refills: 0 | Status: COMPLETED | OUTPATIENT
Start: 2021-11-03 | End: 2021-11-04

## 2021-11-03 RX ORDER — MAGNESIUM SULFATE 500 MG/ML
2 VIAL (ML) INJECTION ONCE
Refills: 0 | Status: COMPLETED | OUTPATIENT
Start: 2021-11-03 | End: 2021-11-03

## 2021-11-03 RX ORDER — NITROGLYCERIN 6.5 MG
0.5 CAPSULE, EXTENDED RELEASE ORAL
Refills: 0 | Status: DISCONTINUED | OUTPATIENT
Start: 2021-11-03 | End: 2021-11-11

## 2021-11-03 RX ADMIN — PANTOPRAZOLE SODIUM 40 MILLIGRAM(S): 20 TABLET, DELAYED RELEASE ORAL at 17:36

## 2021-11-03 RX ADMIN — Medication 50 GRAM(S): at 12:29

## 2021-11-03 RX ADMIN — Medication 0.5 INCH(S): at 23:33

## 2021-11-03 RX ADMIN — Medication 1 GRAM(S): at 05:17

## 2021-11-03 RX ADMIN — Medication 1 TABLET(S): at 12:29

## 2021-11-03 RX ADMIN — Medication 650 MILLIGRAM(S): at 22:26

## 2021-11-03 RX ADMIN — Medication 1 TABLET(S): at 17:36

## 2021-11-03 RX ADMIN — PANTOPRAZOLE SODIUM 40 MILLIGRAM(S): 20 TABLET, DELAYED RELEASE ORAL at 05:17

## 2021-11-03 RX ADMIN — Medication 1 GRAM(S): at 12:00

## 2021-11-03 RX ADMIN — Medication 1 GRAM(S): at 23:05

## 2021-11-03 RX ADMIN — Medication 650 MILLIGRAM(S): at 21:56

## 2021-11-03 RX ADMIN — Medication 1 GRAM(S): at 17:36

## 2021-11-03 RX ADMIN — Medication 0.5 INCH(S): at 12:29

## 2021-11-03 NOTE — PROGRESS NOTE ADULT - SUBJECTIVE AND OBJECTIVE BOX
Subjective:   10/29 EGD w/ bleeding duodenal ulcer that was coagulated and injected with epinephrine; repaired incarcerated L direct inguinal hernia  11/2 EGD w/ duo ulcer + clot s/p epinephrine injection, cautery, clip application  Patient examined at bedside this AM    Objective:  Vital Signs  T(C): 36.7 (11-03 @ 05:13), Max: 36.9 (11-03 @ 02:10)  HR: 91 (11-03 @ 05:13) (64 - 98)  BP: 126/87 (11-03 @ 05:13) (114/75 - 136/74)  RR: 18 (11-03 @ 05:13) (16 - 22)  SpO2: 95% (11-03 @ 05:13) (95% - 100%)  11-01-21 @ 07:01  -  11-02-21 @ 07:00  --------------------------------------------------------  IN:  Total IN: 0 mL    OUT:    Colostomy (mL): 1050 mL    Voided (mL): 2700 mL  Total OUT: 3750 mL    Total NET: -3750 mL      11-02-21 @ 07:01  -  11-03-21 @ 06:05  --------------------------------------------------------  IN:  Total IN: 0 mL    OUT:    Colostomy (mL): 50 mL    Voided (mL): 1100 mL  Total OUT: 1150 mL    Total NET: -1150 mL    Physical Exam:  General Appearance:  NAD  Respiratory: Unlabored breathing  Abdomen: Soft, nontender, laparotomy incision c/d/i  ostomy: ostomy with dark bloody output  Ext: R foot cool but improved from previous days; L DP & PT both palpable; R venous PT signal; R arterial DP signal. Mobility and sensation is conserved. R great toe w/ focus of duskiness    Labs:                        8.0    7.60  )-----------( 205      ( 02 Nov 2021 07:41 )             23.6   11-02    134<L>  |  102  |  7   ----------------------------<  96  4.2   |  24  |  0.49<L>    Ca    8.0<L>      02 Nov 2021 07:41  Phos  2.7     11-02  Mg     1.80     11-02      CAPILLARY BLOOD GLUCOSE          Microbiology:      Medications:   MEDICATIONS  (STANDING):  chlorhexidine 4% Liquid 1 Application(s) Topical <User Schedule>  dextrose 5% + sodium chloride 0.9%. 1000 milliLiter(s) (40 mL/Hr) IV Continuous <Continuous>  pantoprazole  Injectable 40 milliGRAM(s) IV Push every 12 hours  sucralfate 1 Gram(s) Oral four times a day    MEDICATIONS  (PRN):  acetaminophen     Tablet .. 650 milliGRAM(s) Oral every 6 hours PRN Temp greater or equal to 38C (100.4F), Mild Pain (1 - 3)

## 2021-11-03 NOTE — PROGRESS NOTE ADULT - ASSESSMENT
DAVID QUEEN is a 81y Female with a PPM in place, unclear why, h/o rectal CA s/p rectal surgery with colostomy about 2 weeks prior to presentation at [facility] c/b ?ischemic leg with RLE thrombus started on Eliquis who initially presented to outside hospital with reports coffee ground emesis/melena from colostomy, GI consulted for further evaluation.   \  Recommendation:      - [facility].                       - Clear liquid diet.                       - PPI drip.     - If recurrent bleeding, recommend IR consultation for                        possible embolization.                       - Closely monitor CBC and for recurrent bleeding.                       - Anticipate ongoing melena given large clot burden in                        stomach.  Impression:  #. Coffee ground emesis/melena in colostomy s/p EGD with oozing duodenal ulcer s/p epi/bicap 10/28. Repeat EGD 11/2 notable for one non-bleeding duodenal ulcer with overlying clot/visible vessel s/p epi and bipolar cautery and clip, clotted blood in stomach.  #. High grade SBO c/b possible incarcerated hernia s/p Ex lap repair of incarcerated left inguinal hernia with mesh  #. LE Thrombus/PVD  - OSH CTA 10/28 negative for active extravasation     Recommendations:  - CLD today, consider advancing diet tomorrow 11/4 if no signs of active bleeding and Hgb stable  - c/w PPI IV BID for at least   - Trend Hgb, Transfuse if Hgb < 8 considering cardiac history  - Ensure adequate hydration and electrolyte repletion   - Hold AC for at least 72 hrs if possible, unless required per primary. May consider hep gtt after as patient at increased risk for rebleeding. Will defer final decision to primary, weigh risk vs benefit  - If rebleeds, consult IR. No additional available options from GI standpoint  - Rest of care per primary    Thank you for involving us in this patient's care. Please contact GI if any further questions or concerns.    Case discussed with Attending, Dr. Tom Ramos.    Madison Nunez MD  Gastroenterology/Hepatology Fellow, PGY-V    NON-URGENT CONSULTS:  Please email [email or web address]  [email or web address]  AT NIGHT AND ON WEEKENDS:  Contact on-call GI fellow via answering service ([phone]) from 5pm-8am and on weekends/holidays  MONDAY-FRIDAY 8AM-5PM:  Pager# [phone] ([facility])  GI Phone# [phone] ([facility]) DAVID QUEEN is a 81y Female with a PPM in place, unclear why, h/o rectal CA s/p rectal surgery with colostomy about 2 weeks prior to presentation at [facility] c/b ?ischemic leg with RLE thrombus started on Eliquis who initially presented to outside hospital with reports coffee ground emesis/melena from colostomy, GI consulted for further evaluation.   \  Recommendation:      - [facility] care.                       - Clear liquid diet.                       - PPI drip.     - If recurrent bleeding, recommend IR consultation for                        possible embolization.                       - Closely monitor CBC and for recurrent bleeding.                       - Anticipate ongoing melena given large clot burden in                        stomach.  Impression:  #. Coffee ground emesis/melena in colostomy s/p EGD with oozing duodenal ulcer s/p epi/bicap 10/28. Repeat EGD 11/2 notable for one non-bleeding duodenal ulcer with overlying clot/visible vessel s/p epi and bipolar cautery and clip, clotted blood in stomach.  #. High grade SBO c/b possible incarcerated hernia s/p Ex lap repair of incarcerated left inguinal hernia with mesh  #. LE Thrombus/PVD  - OSH CTA 10/28 negative for active extravasation     Recommendations:  - CLD today, consider advancing diet tomorrow 11/4 if no signs of active bleeding and Hgb stable  - c/w PPI IV BID for at least   - Trend Hgb, Transfuse if Hgb < 8 considering cardiac history  - Ensure adequate hydration and electrolyte repletion   - Patient is at risk of rebleeding. Hold AC for at least 72 hrs if possible, unless required per primary. May consider hep gtt as patient at increased risk for rebleeding and this can be more quickly discontinued. Will defer final decision to primary, weigh risk vs benefit  - If rebleeds, consult IR. No additional available options from GI standpoint  - Rest of care per primary    Thank you for involving us in this patient's care. Please contact GI if any further questions or concerns.    Case discussed with Attending, Dr. Tom Ramos.    Madison Nunez MD  Gastroenterology/Hepatology Fellow, PGY-V    NON-URGENT CONSULTS:  Please email [email or web address]  AT NIGHT AND ON WEEKENDS:  Contact on-call GI fellow via answering service ([phone]) from 5pm-8am and on weekends/holidays  MONDAY-FRIDAY 8AM-5PM:  Pager# [phone] ([facility])  GI Phone# [phone] ([facility]) DAVID QUEEN is a 81y Female with a PPM in place, unclear why, h/o rectal CA s/p rectal surgery with colostomy about 2 weeks prior to presentation at [facility] c/b ?ischemic leg with RLE thrombus started on Eliquis who initially presented to outside hospital with reports coffee ground emesis/melena from colostomy, GI consulted for further evaluation.     Impression:  #. Coffee ground emesis/melena in colostomy s/p EGD with oozing duodenal ulcer s/p epi/bicap 10/28. Repeat EGD 11/2 notable for one non-bleeding duodenal ulcer with overlying clot/visible vessel s/p epi and bipolar cautery and clip, clotted blood in stomach.  #. High grade SBO c/b possible incarcerated hernia s/p Ex lap repair of incarcerated left inguinal hernia with mesh  #. LE Thrombus/PVD  - OSH CTA 10/28 negative for active extravasation     Recommendations:  - CLD today, consider advancing diet tomorrow 11/4 if no signs of active bleeding and Hgb stable  - c/w PPI IV BID for at least   - Trend Hgb, Transfuse if Hgb < 8 considering cardiac history  - Ensure adequate hydration and electrolyte repletion   - Patient is at risk of rebleeding. Hold AC for at least 72 hrs if possible, unless required per primary. May consider hep gtt as patient at increased risk for rebleeding and this can be more quickly discontinued. Will defer final decision to primary, weigh risk vs benefit  - If rebleeds, consult IR. No additional available options from GI standpoint  - Rest of care per primary    Thank you for involving us in this patient's care. Please contact GI if any further questions or concerns.    Case discussed with Attending, Dr. Tom Ramos.    Madison Nunez MD  Gastroenterology/Hepatology Fellow, PGY-V    NON-URGENT CONSULTS:  Please email [email or web address]  AT NIGHT AND ON WEEKENDS:  Contact on-call GI fellow via answering service ([phone]) from 5pm-8am and on weekends/holidays  MONDAY-FRIDAY 8AM-5PM:  Pager# [phone] ([facility])  GI Phone# [phone] ([facility])

## 2021-11-03 NOTE — PROGRESS NOTE ADULT - SUBJECTIVE AND OBJECTIVE BOX
SURGERY DAILY PROGRESS NOTE:     SUBJECTIVE:   Status post EGD by GI, no acute events overnight, seen and examined this AM. Tolerating clear liquid diet.     OBJECTIVE:  Vital Signs Last 24 Hrs  T(C): 36.7 (02 Nov 2021 21:05), Max: 36.8 (02 Nov 2021 01:48)  T(F): 98 (02 Nov 2021 21:05), Max: 98.3 (02 Nov 2021 05:03)  HR: 64 (02 Nov 2021 21:05) (64 - 98)  BP: 114/75 (02 Nov 2021 21:05) (114/75 - 142/88)  BP(mean): --  RR: 20 (02 Nov 2021 21:05) (16 - 22)  SpO2: 100% (02 Nov 2021 21:05) (96% - 100%)    I&O's Detail    01 Nov 2021 07:01  -  02 Nov 2021 07:00  --------------------------------------------------------  IN:    dextrose 5% + sodium chloride 0.45% w/ Additives: 400 mL    Oral Fluid: 270 mL  Total IN: 670 mL    OUT:    Colostomy (mL): 1050 mL    Voided (mL): 2700 mL  Total OUT: 3750 mL    Total NET: -3080 mL      02 Nov 2021 07:01  -  03 Nov 2021 01:05  --------------------------------------------------------  IN:    dextrose 5% + sodium chloride 0.45% w/ Additives: 200 mL    dextrose 5% + sodium chloride 0.9%: 40 mL  Total IN: 240 mL    OUT:    Colostomy (mL): 50 mL    Voided (mL): 1100 mL  Total OUT: 1150 mL    Total NET: -910 mL    Physical Exam:  General Appearance:  NAD  Respiratory: Unlabored breathing  Abdomen: Soft, appropriate incisional tenderness, incision c/d/i  ostomy: ostomy with dark bloody output, no gas  Ext: R foot cool but improved from previous days; L DP & PT both palpable; R venous PT signal; R arterial DP signal. Mobility and sensation is preserved. R great toe w/ focus of duskiness      LABS:                            8.0    7.60  )-----------( 205      ( 02 Nov 2021 07:41 )             23.6   11-02    134<L>  |  102  |  7   ----------------------------<  96  4.2   |  24  |  0.49<L>    Ca    8.0<L>      02 Nov 2021 07:41  Phos  2.7     11-02  Mg     1.80     11-02                                           SURGERY DAILY PROGRESS NOTE:     SUBJECTIVE:   Status post EGD by GI, no acute events overnight, seen and examined this AM. Tolerating clear liquid diet.     Vital Signs Last 24 Hrs  T(C): 36.7 (03 Nov 2021 05:13), Max: 36.9 (03 Nov 2021 02:10)  T(F): 98 (03 Nov 2021 05:13), Max: 98.5 (03 Nov 2021 02:10)  HR: 91 (03 Nov 2021 05:13) (64 - 98)  BP: 126/87 (03 Nov 2021 05:13) (114/75 - 136/74)  RR: 18 (03 Nov 2021 05:13) (16 - 22)  SpO2: 95% (03 Nov 2021 05:13) (95% - 100%)    Physical Exam:  General Appearance:  NAD  Respiratory: Unlabored breathing, no wheezing   Abdomen: Soft,nontender, lower midline incision healing well   Left groin incision c/d/i with dermabond   ostomy: ostomy with melana   Ext: R foot warmer than yesterday. R great toe with dry gangrene. Motion and sensation in foot and ankle intact       11/3 labs pending

## 2021-11-03 NOTE — PROGRESS NOTE ADULT - ATTENDING COMMENTS
S/p EGD yesterday with DU with high-risk stigmata s/p epi and cautery. A clip was placed adjacent to lesion for marking in event patient rebleeds. Given 2 endoscopic attempts and difficult location of ulcer/vessel, if patient rebleeds would consult IR for possible embolization. Continue PPI, as above. Would ideally wait additional 72 hours post-endoscopic intervention prior to initiation of A/C; however, r/b/a per primary team.

## 2021-11-03 NOTE — PROGRESS NOTE ADULT - ASSESSMENT
Assessment:  81y Female with PMH of HTN, Raynaud's disease, rectal CA s/p LAR and diverting ileostomy, pacemaker, right cold leg on Eliquis, presenting with melena, found to have SBO from left inguinal hernia, now s/p EGD (Found to have bleeding duodenal ulcer) s/p hemostasis and reduction of SBO with mesh placement on 10/29.     Plan:  - AC on hold for bleeding   - protonix BID  - Pain control  - Monitor Vital signs, Is/Os  - Trend H/H   - Home beta blocker held per vascular surgery recs, will resume if tachycardic      B Team Surgery   [id number]   Assessment:  81y Female with PMH of HTN, Raynaud's disease, pacemaker, rectal CA s/p recent LAR and diverting ostomy complicated by RLE ischemia on Eliquis, presenting with melena, found to have SBO from left inguinal hernia, now s/p EGD (Found to have bleeding duodenal ulcer) s/p hemostasis and reduction of SBO with mesh placement on 10/29. Underwent repeat EGD 11/2 with cautery and clipping of duodenal ulcer     Plan:  - AC on hold for bleeding, will resume when cleared by GI and when HCT stablizes   -If rebleeds, will likely need repeat CTA with possible IR intervention   -BID protonix   -Trend H/H   -PT - recommending home PT   - Home beta blocker held per vascular surgery recs, will resume if tachycardic      B Team Surgery   [id number]

## 2021-11-03 NOTE — PROGRESS NOTE ADULT - ASSESSMENT
81F w/ Rayisrraelud's recent LAR with diverting ostomy for rectal cancer c/b post-op R cold leg started on Eliquis now here with acute GI bleed s/p EGD with interventions and L incarcerated inguinal hernia s/p repair 10/29. Vascular surgery following for R cold foot.    Plan:   - Optimize medical management for patient (ASA/stain, HTN control, DM control)  - Plan for AC pending clearance by GI   - Resume beta-blocker per primary team   - Consuelo hugger and nitro paste to R foot  - Continue care per primary team    Vascular Surgery  [id number]

## 2021-11-03 NOTE — PROGRESS NOTE ADULT - SUBJECTIVE AND OBJECTIVE BOX
Chief Complaint:  Patient is a 81y old  Female who presents with a chief complaint of GI bleed, SBO 2/2 L inguinal hernia (03 Nov 2021 06:04)      Reason for consult: melena    Interval Events:   Afebrile, Hgb stable  No abdominal pain/tenderness    Hospital Medications:  acetaminophen     Tablet .. 650 milliGRAM(s) Oral every 6 hours PRN  nitroglycerin    2% Ointment 0.5 Inch(s) Transdermal <User Schedule>  pantoprazole  Injectable 40 milliGRAM(s) IV Push every 12 hours  sucralfate 1 Gram(s) Oral four times a day      ROS:   Complete and normal except as mentioned above.    PHYSICAL EXAM:   Vital Signs:  Vital Signs Last 24 Hrs  T(C): 37.2 (03 Nov 2021 09:43), Max: 37.2 (03 Nov 2021 09:43)  T(F): 98.9 (03 Nov 2021 09:43), Max: 98.9 (03 Nov 2021 09:43)  HR: 86 (03 Nov 2021 09:43) (64 - 98)  BP: 138/63 (03 Nov 2021 09:43) (114/75 - 138/63)  BP(mean): --  RR: 18 (03 Nov 2021 09:43) (17 - 22)  SpO2: 98% (03 Nov 2021 09:43) (95% - 100%)  Daily     Daily     GENERAL: no acute distress  NEURO: alert  HEENT: anicteric sclera, no conjunctival pallor appreciated  CHEST: no respiratory distress, no accessory muscle use  CARDIAC: regular rate, +S1/S2  ABDOMEN: ostomy with Dark brown-black liquid/mushy stool, soft, nondistended, nontender, no rebound or guarding  EXTREMITIES: warm, well perfused, no edema  SKIN: no lesions noted    LABS: reviewed                        7.8    10.02 )-----------( 226      ( 03 Nov 2021 08:17 )             24.0     11-03    133<L>  |  101  |  8   ----------------------------<  105<H>  3.8   |  24  |  0.50    Ca    8.2<L>      03 Nov 2021 08:17  Phos  2.2     11-03  Mg     1.70     11-03          Interval Diagnostic Studies: see sunrise for full report   Chief Complaint:  Patient is a 81y old  Female who presents with a chief complaint of GI bleed, SBO 2/2 L inguinal hernia (03 Nov 2021 06:04)    Reason for consult: melena    Interval Events:   Afebrile, Hgb stable  No abdominal pain/tenderness    Hospital Medications:  acetaminophen     Tablet .. 650 milliGRAM(s) Oral every 6 hours PRN  nitroglycerin    2% Ointment 0.5 Inch(s) Transdermal <User Schedule>  pantoprazole  Injectable 40 milliGRAM(s) IV Push every 12 hours  sucralfate 1 Gram(s) Oral four times a day      ROS:   Complete and normal except as mentioned above.    PHYSICAL EXAM:   Vital Signs:  Vital Signs Last 24 Hrs  T(C): 37.2 (03 Nov 2021 09:43), Max: 37.2 (03 Nov 2021 09:43)  T(F): 98.9 (03 Nov 2021 09:43), Max: 98.9 (03 Nov 2021 09:43)  HR: 86 (03 Nov 2021 09:43) (64 - 98)  BP: 138/63 (03 Nov 2021 09:43) (114/75 - 138/63)  BP(mean): --  RR: 18 (03 Nov 2021 09:43) (17 - 22)  SpO2: 98% (03 Nov 2021 09:43) (95% - 100%)  Daily     Daily     GENERAL: no acute distress  NEURO: alert  HEENT: anicteric sclera, no conjunctival pallor appreciated  CHEST: no respiratory distress, no accessory muscle use  CARDIAC: regular rate, +S1/S2  ABDOMEN: ostomy with Dark brown-black liquid/mushy stool, soft, nondistended, nontender, no rebound or guarding  EXTREMITIES: warm, well perfused, no edema  SKIN: no lesions noted    LABS: reviewed                        7.8    10.02 )-----------( 226      ( 03 Nov 2021 08:17 )             24.0     11-03    133<L>  |  101  |  8   ----------------------------<  105<H>  3.8   |  24  |  0.50    Ca    8.2<L>      03 Nov 2021 08:17  Phos  2.2     11-03  Mg     1.70     11-03          Interval Diagnostic Studies: see sunrise for full report

## 2021-11-04 ENCOUNTER — TRANSCRIPTION ENCOUNTER (OUTPATIENT)
Age: 81
End: 2021-11-04

## 2021-11-04 LAB
ANION GAP SERPL CALC-SCNC: 7 MMOL/L — SIGNIFICANT CHANGE UP (ref 7–14)
APTT BLD: 21.9 SEC — LOW (ref 27–36.3)
BLD GP AB SCN SERPL QL: NEGATIVE — SIGNIFICANT CHANGE UP
BUN SERPL-MCNC: 6 MG/DL — LOW (ref 7–23)
CALCIUM SERPL-MCNC: 7.9 MG/DL — LOW (ref 8.4–10.5)
CHLORIDE SERPL-SCNC: 104 MMOL/L — SIGNIFICANT CHANGE UP (ref 98–107)
CO2 SERPL-SCNC: 24 MMOL/L — SIGNIFICANT CHANGE UP (ref 22–31)
CREAT SERPL-MCNC: 0.58 MG/DL — SIGNIFICANT CHANGE UP (ref 0.5–1.3)
GLUCOSE SERPL-MCNC: 90 MG/DL — SIGNIFICANT CHANGE UP (ref 70–99)
HCT VFR BLD CALC: 20.1 % — CRITICAL LOW (ref 34.5–45)
HCT VFR BLD CALC: 34.6 % — SIGNIFICANT CHANGE UP (ref 34.5–45)
HGB BLD-MCNC: 11.4 G/DL — LOW (ref 11.5–15.5)
HGB BLD-MCNC: 6.5 G/DL — CRITICAL LOW (ref 11.5–15.5)
INR BLD: 1 RATIO — SIGNIFICANT CHANGE UP (ref 0.88–1.16)
MAGNESIUM SERPL-MCNC: 2 MG/DL — SIGNIFICANT CHANGE UP (ref 1.6–2.6)
MCHC RBC-ENTMCNC: 30.1 PG — SIGNIFICANT CHANGE UP (ref 27–34)
MCHC RBC-ENTMCNC: 31.1 PG — SIGNIFICANT CHANGE UP (ref 27–34)
MCHC RBC-ENTMCNC: 32.3 GM/DL — SIGNIFICANT CHANGE UP (ref 32–36)
MCHC RBC-ENTMCNC: 32.9 GM/DL — SIGNIFICANT CHANGE UP (ref 32–36)
MCV RBC AUTO: 91.3 FL — SIGNIFICANT CHANGE UP (ref 80–100)
MCV RBC AUTO: 96.2 FL — SIGNIFICANT CHANGE UP (ref 80–100)
NRBC # BLD: 0 /100 WBCS — SIGNIFICANT CHANGE UP
NRBC # BLD: 0 /100 WBCS — SIGNIFICANT CHANGE UP
NRBC # FLD: 0 K/UL — SIGNIFICANT CHANGE UP
NRBC # FLD: 0 K/UL — SIGNIFICANT CHANGE UP
PHOSPHATE SERPL-MCNC: 3.2 MG/DL — SIGNIFICANT CHANGE UP (ref 2.5–4.5)
PLATELET # BLD AUTO: 202 K/UL — SIGNIFICANT CHANGE UP (ref 150–400)
PLATELET # BLD AUTO: 237 K/UL — SIGNIFICANT CHANGE UP (ref 150–400)
POTASSIUM SERPL-MCNC: 3.9 MMOL/L — SIGNIFICANT CHANGE UP (ref 3.5–5.3)
POTASSIUM SERPL-SCNC: 3.9 MMOL/L — SIGNIFICANT CHANGE UP (ref 3.5–5.3)
PROTHROM AB SERPL-ACNC: 11.4 SEC — SIGNIFICANT CHANGE UP (ref 10.6–13.6)
RBC # BLD: 2.09 M/UL — LOW (ref 3.8–5.2)
RBC # BLD: 3.79 M/UL — LOW (ref 3.8–5.2)
RBC # FLD: 19.8 % — HIGH (ref 10.3–14.5)
RBC # FLD: 21.4 % — HIGH (ref 10.3–14.5)
RH IG SCN BLD-IMP: NEGATIVE — SIGNIFICANT CHANGE UP
SODIUM SERPL-SCNC: 135 MMOL/L — SIGNIFICANT CHANGE UP (ref 135–145)
WBC # BLD: 6.42 K/UL — SIGNIFICANT CHANGE UP (ref 3.8–10.5)
WBC # BLD: 7.47 K/UL — SIGNIFICANT CHANGE UP (ref 3.8–10.5)
WBC # FLD AUTO: 6.42 K/UL — SIGNIFICANT CHANGE UP (ref 3.8–10.5)
WBC # FLD AUTO: 7.47 K/UL — SIGNIFICANT CHANGE UP (ref 3.8–10.5)

## 2021-11-04 PROCEDURE — 36247 INS CATH ABD/L-EXT ART 3RD: CPT

## 2021-11-04 PROCEDURE — 36248 INS CATH ABD/L-EXT ART ADDL: CPT

## 2021-11-04 PROCEDURE — 99232 SBSQ HOSP IP/OBS MODERATE 35: CPT | Mod: GC

## 2021-11-04 PROCEDURE — 37244 VASC EMBOLIZE/OCCLUDE BLEED: CPT

## 2021-11-04 PROCEDURE — 36245 INS CATH ABD/L-EXT ART 1ST: CPT | Mod: 59

## 2021-11-04 RX ORDER — FUROSEMIDE 40 MG
10 TABLET ORAL ONCE
Refills: 0 | Status: COMPLETED | OUTPATIENT
Start: 2021-11-04 | End: 2021-11-04

## 2021-11-04 RX ORDER — DEXTROSE MONOHYDRATE, SODIUM CHLORIDE, AND POTASSIUM CHLORIDE 50; .745; 4.5 G/1000ML; G/1000ML; G/1000ML
1000 INJECTION, SOLUTION INTRAVENOUS
Refills: 0 | Status: DISCONTINUED | OUTPATIENT
Start: 2021-11-04 | End: 2021-11-05

## 2021-11-04 RX ADMIN — Medication 1 GRAM(S): at 05:15

## 2021-11-04 RX ADMIN — PANTOPRAZOLE SODIUM 40 MILLIGRAM(S): 20 TABLET, DELAYED RELEASE ORAL at 05:15

## 2021-11-04 RX ADMIN — Medication 0.5 INCH(S): at 22:43

## 2021-11-04 RX ADMIN — Medication 0.5 INCH(S): at 12:45

## 2021-11-04 RX ADMIN — Medication 10 MILLIGRAM(S): at 21:04

## 2021-11-04 RX ADMIN — Medication 1 GRAM(S): at 12:10

## 2021-11-04 RX ADMIN — Medication 1 GRAM(S): at 23:29

## 2021-11-04 RX ADMIN — Medication 0.5 INCH(S): at 05:16

## 2021-11-04 RX ADMIN — Medication 1 TABLET(S): at 09:14

## 2021-11-04 RX ADMIN — DEXTROSE MONOHYDRATE, SODIUM CHLORIDE, AND POTASSIUM CHLORIDE 50 MILLILITER(S): 50; .745; 4.5 INJECTION, SOLUTION INTRAVENOUS at 12:28

## 2021-11-04 NOTE — CONSULT NOTE ADULT - SUBJECTIVE AND OBJECTIVE BOX
Interventional Radiology    Evaluate for Procedure: Angiogram with embolization for GI bleed    HPI: 81y Female with PMH of HTN, Raynaud's disease, pacemaker, rectal CA s/p recent LAR and diverting ostomy complicated by RLE ischemia on Eliquis, presenting with melena, found to have SBO from left inguinal hernia, now s/p EGD (Found to have bleeding duodenal ulcer) s/p hemostasis and reduction of SBO with mesh placement on 10/29. Underwent repeat EGD 11/2 with cautery and clipping of duodenal ulcer now rebleeding with acute Hgb drop. IR consulted for angiogram and embolization.     Allergies: ACE inhibitors (Angioedema)  amoxicillin (Angioedema)  irbesartan (Angioedema)  Norvasc (Angioedema)    Medications (Abx/Cardiac/Anticoagulation/Blood Products)    nitroglycerin    2% Ointment: 0.5 Inch(s) Transdermal (11-04 @ 05:16)    Data:    T(C): 36.6  HR: 90  BP: 143/76  RR: 18  SpO2: 100%    -WBC 6.42 / HgB 6.5 / Hct 20.1 / Plt 202  -Na 135 / Cl 104 / BUN 6 / Glucose 90  -K 3.9 / CO2 24 / Cr 0.58  -ALT -- / Alk Phos -- / T.Bili --  -INR 1.00 / PTT 21.9      Radiology: reviewed    Assessment/Plan: 82 yo F with bleeding duodenal ulcer s/p GI intervention now rebleeding with acute hgb drop. IR consulted for angiogram and embolization.     -- IR will plan to perform today 11/4/21  -- Keep NPO  -- hold anticoagulation  -- patient has thus far been transfused 1u pRBC a couple days ago. transfuse up to 4 u pRBC as well as FFP and platelets  --ICU consult  -- please place IR procedure request order and preprocedure note under Dr. Addison  --discussed with surgical team

## 2021-11-04 NOTE — PROGRESS NOTE ADULT - ATTENDING COMMENTS
No further overt bleeding, but Hgb again dropped to 6s today. IR has been consulted and plans for embolization today.   Continue high dose PPI. Appreciate primary team and IR assistance in management of this patient.

## 2021-11-04 NOTE — CONSULT NOTE ADULT - REASON FOR ADMISSION
GI bleed, SBO 2/2 L inguinal hernia

## 2021-11-04 NOTE — PROGRESS NOTE ADULT - ASSESSMENT
Assessment:  81y Female with PMH of HTN, Raynaud's disease, pacemaker, rectal CA s/p recent LAR and diverting ostomy complicated by RLE ischemia on Eliquis, presenting with melena, found to have SBO from left inguinal hernia, now s/p EGD (Found to have bleeding duodenal ulcer) s/p hemostasis and reduction of SBO with mesh placement on 10/29. Underwent repeat EGD 11/2 with cautery and clipping of duodenal ulcer     Plan:  -Diet, LRD as tolerated  -AC on hold for bleeding, will resume when cleared by GI and when HCT stablizes   -If rebleeds, will likely need repeat CTA with possible IR intervention   -BID protonix   -Trend H/H   -PT:  recommending home PT   -Home beta blocker held per vascular surgery recs, will resume if tachycardic      B Team Surgery   [id number]   Assessment:  81y Female with PMH of HTN, Raynaud's disease, pacemaker, rectal CA s/p recent LAR and diverting ostomy complicated by RLE ischemia on Eliquis, presenting with melena, found to have SBO from left inguinal hernia, now s/p EGD (Found to have bleeding duodenal ulcer) s/p hemostasis and reduction of SBO with mesh placement on 10/29. Underwent repeat EGD 11/2 with cautery and clipping of duodenal ulcer     Plan:  -AM CBC hemoglobin 6.5 from 7.8. IR consulted, appreciate recs  -Diet: NPO  -AC on hold for bleeding, will resume when cleared by GI and when HCT stablizes   -If rebleeds, will likely need repeat CTA with possible IR intervention   -BID protonix   -Trend H/H   -PT:  recommending home PT   -Home beta blocker held per vascular surgery recs, will resume if tachycardic      B Team Surgery   [id number]

## 2021-11-04 NOTE — PROGRESS NOTE ADULT - SUBJECTIVE AND OBJECTIVE BOX
SURGERY DAILY PROGRESS NOTE:     SUBJECTIVE:   No acute events overnight, patient seen and examined at bedside this AM.     OBJECTIVE:  Vital Signs Last 24 Hrs  T(C): 36.9 (03 Nov 2021 21:12), Max: 37.2 (03 Nov 2021 09:43)  T(F): 98.5 (03 Nov 2021 21:12), Max: 98.9 (03 Nov 2021 09:43)  HR: 88 (03 Nov 2021 21:12) (78 - 91)  BP: 123/73 (03 Nov 2021 21:12) (123/73 - 138/63)  BP(mean): --  RR: 17 (03 Nov 2021 21:12) (17 - 18)  SpO2: 99% (03 Nov 2021 21:12) (95% - 99%)    I&O's Detail    02 Nov 2021 07:01  -  03 Nov 2021 07:00  --------------------------------------------------------  IN:    dextrose 5% + sodium chloride 0.45% w/ Additives: 200 mL    dextrose 5% + sodium chloride 0.9%: 280 mL    Oral Fluid: 150 mL  Total IN: 630 mL    OUT:    Colostomy (mL): 300 mL    Voided (mL): 1650 mL  Total OUT: 1950 mL    Total NET: -1320 mL      03 Nov 2021 07:01  -  04 Nov 2021 01:31  --------------------------------------------------------  IN:    Oral Fluid: 150 mL  Total IN: 150 mL    OUT:    Colostomy (mL): 630 mL    Voided (mL): 300 mL  Total OUT: 930 mL    Total NET: -780 mL    Physical Exam:  General Appearance:  NAD  Respiratory: Unlabored breathing, no wheezing   Abdomen: Soft,nontender, lower midline incision healing well   Left groin incision c/d/i with dermabond   ostomy: ostomy with melena   Ext: R foot warm. R great toe with dry gangrene. Motion and sensation in foot and ankle intact       LABS:                        7.8    10.02 )-----------( 226      ( 03 Nov 2021 08:17 )             24.0   11-03    133<L>  |  101  |  8   ----------------------------<  105<H>  3.8   |  24  |  0.50    Ca    8.2<L>      03 Nov 2021 08:17  Phos  2.2     11-03  Mg     1.70     11-03

## 2021-11-04 NOTE — PROGRESS NOTE ADULT - SUBJECTIVE AND OBJECTIVE BOX
HPI:  10/29 EGD w/ bleeding duodenal ulcer that was coagulated and injected with epinephrine; repaired incarcerated L direct inguinal hernia  11/2 EGD w/ duo ulcer + clot s/p epinephrine injection, cautery, clip application  Patient examined at bedside this AM    Subjective:   Patient examined at bedside this AM  Sleeping comfortably  Duvet cover over feet   No changes in toe pain     Objective:  Vital Signs  T(C): 36.6 (11-04 @ 08:35), Max: 37.2 (11-03 @ 09:43)  HR: 90 (11-04 @ 08:35) (85 - 93)  BP: 143/76 (11-04 @ 08:35) (122/68 - 143/76)  RR: 18 (11-04 @ 08:35) (16 - 18)  SpO2: 100% (11-04 @ 08:35) (98% - 100%)  11-03-21 @ 07:01  -  11-04-21 @ 07:00  --------------------------------------------------------  IN:  Total IN: 0 mL    OUT:    Colostomy (mL): 680 mL    Voided (mL): 650 mL  Total OUT: 1330 mL    Total NET: -1330 mL      Physical Exam:  General: alert and oriented, NAD  Resp: airway patent, respirations unlabored  Ext: R foot cool but improved from previous days; L DP & PT both palpable; R venous PT signal; R arterial DP signal. Mobility and sensation is conserved. R great toe w/ focus of duskiness    Labs:                        6.5    6.42  )-----------( 202 ( 04 Nov 2021 06:23 )             20.1   11-04    135  |  104  |  6<L>  ----------------------------<  90  3.9   |  24  |  0.58    Ca    7.9<L>      04 Nov 2021 06:23  Phos  3.2     11-04  Mg     2.00     11-04      CAPILLARY BLOOD GLUCOSE          Microbiology:      Medications:   MEDICATIONS  (STANDING):  dextrose 5% + sodium chloride 0.45% with potassium chloride 20 mEq/L 1000 milliLiter(s) (100 mL/Hr) IV Continuous <Continuous>  nitroglycerin    2% Ointment 0.5 Inch(s) Transdermal <User Schedule>  pantoprazole  Injectable 40 milliGRAM(s) IV Push every 12 hours  potassium phosphate / sodium phosphate Tablet (K-PHOS No. 2) 1 Tablet(s) Oral three times a day with meals  sucralfate 1 Gram(s) Oral four times a day    MEDICATIONS  (PRN):  acetaminophen     Tablet .. 650 milliGRAM(s) Oral every 6 hours PRN Temp greater or equal to 38C (100.4F), Mild Pain (1 - 3)

## 2021-11-04 NOTE — CONSULT NOTE ADULT - SUBJECTIVE AND OBJECTIVE BOX
SICU Consultation Note  =====================================================  HPI:  81F hx of Raynaud's, lupus, HTN, known L inguinal hernia planned for repair later this year, rectal cancer s/p LAR and diverting transverse colostomy at [facility] 2 weeks ago c/b post op R cold leg on eliquis here with 1 day of hematemesis and melena through ostomy found to have high grade SBO 2/2 L inguinal hernia. Attempted to reduce at bedside after NGT placement, unsuccessful. NGT with dark blood output, GI aware. Vascular surgery no acute plan for R foot at this time given acute GI bleed and SBO. WBC 25, hgb 3 s/p PRBC x 3u, FFP x 2u, KCentra --> 11 --> 9, CTA with run off shows high grade SBO 2/2 L inguinal hernia and 3v occlusion to R foot.    Patient admitted to acute care surgery service for GIB and SBO at left inguinal hernia. Patient taken to OR by acute care surgery with gastroenterology.  Patient taken by GI for EGD performed in operating room at time of planned surgery for incarcerated femoral hernia associated with high-grade small bowel obstruction. EGD noted for large posterior wall duodenal bulbar ulcer. Transient oozing noted with spontaneous cessation. Site of bleeding injected with 2 mL of 1-10,000 epinephrine followed by bipolar cautery. No active bleeding at end of EGD examination. Patient then underwent L inguinal hernia repair with plug/mesh. Bowel was viable.    11/1-11/2 Patient noted to have downtrending H/H to 6.5/18.8. Transfused 1upRBC. Underwent repeat EGD 11/2 which revealed one non-bleeding duodenal ulcer with overlying clot and a visible vessel. Injected with epi. Treated with bipolar cautery. Clip was placed for marking. H/H responded appropriately.     Today patient with anemia again with H/H 6.5/20.1. IR consulted for possible angiogram and embolization of vessel visualized by GI on 11/2. SICU consulted given GIB requiring transfusion and possible IR embolization.    PAST MEDICAL HISTORY:  Hypertension    Osteoporosis    Varicose vein of leg    Raynaud disease        PAST SURGICAL HISTORY:  Inguinal hernia        MEDICATIONS:      ALLERGIES:  ACE inhibitors (Angioedema)  amoxicillin (Angioedema)  irbesartan (Angioedema)  Norvasc (Angioedema)      VITALS & I/Os:  Vital Signs Last 24 Hrs  T(C): 36.7 (28 Oct 2021 16:22), Max: 37 (28 Oct 2021 15:25)  T(F): 98 (28 Oct 2021 16:22), Max: 98.6 (28 Oct 2021 15:25)  HR: 96 (28 Oct 2021 16:22) (68 - 106)  BP: 123/70 (28 Oct 2021 16:22) (80/53 - 123/76)  BP(mean): --  RR: 20 (28 Oct 2021 16:22) (15 - 20)  SpO2: 100% (28 Oct 2021 16:22) (97% - 100%)    I&O's Summary      PHYSICAL EXAM:  General: No acute distress, pleasant oriented  Respiratory: Nonlabored  Cardiovascular: appears well perfused  Abdominal: Soft, nondistended, L groin TTP mild. No rebound or guarding. No organomegaly, no palpable mass.  Extremities: Warm    LABS:                        9.4    26.98 )-----------( 268      ( 28 Oct 2021 17:14 )             27.5     10-28    135  |  105  |  24<H>  ----------------------------<  131<H>  5.2   |  16<L>  |  0.68    Ca    7.8<L>      28 Oct 2021 17:14  Phos  2.8     10-28  Mg     1.50     10-28    TPro  5.0<L>  /  Alb  2.6<L>  /  TBili  0.9  /  DBili  x   /  AST  19  /  ALT  17  /  AlkPhos  82  10-28    Lactate:  10-28 @ 17:08  2.4    PT/INR - ( 28 Oct 2021 17:14 )   PT: 12.5 sec;   INR: 1.09 ratio         PTT - ( 28 Oct 2021 17:14 )  PTT:23.7 sec               (28 Oct 2021 18:30)    Allergies: ACE inhibitors (Angioedema)  amoxicillin (Angioedema)  irbesartan (Angioedema)  Norvasc (Angioedema)    PAST MEDICAL & SURGICAL HISTORY:  Hypertension    Osteoporosis    Varicose vein of leg    Raynaud disease    History of left inguinal hernia    Rectal cancer    Inguinal hernia    History of low anterior resection of rectum  diverting colostomy 10/2021 for rectal cancer      FAMILY HISTORY:  Family history of stroke (Mother)  Mother    Family history of acute myocardial infarction (Father)      HOME MEDICATIONS:    · 	apixaban 2.5 mg oral tablet: Last Dose Taken:  , 1 tab(s) orally 2 times a day  · 	levoFLOXacin 750 mg oral tablet: Last Dose Taken:  , 1 tab(s) orally every 24 hours  · 	loperamide 2 mg oral tablet: Last Dose Taken:  , orally 4 times a day  · 	metoprolol tartrate 25 mg oral tablet: Last Dose Taken:  , 0.5 tab(s) orally every 12 hours  · 	atorvastatin 10 mg oral tablet: Last Dose Taken:  , 1 tab(s) orally once a day  · 	aspirin 81 mg oral delayed release tablet: Last Dose Taken:  , 1 tab(s) orally once a day    CURRENT MEDICATIONS:   --------------------------------------------------------------------------------------  Neurologic Medications  acetaminophen     Tablet .. 650 milliGRAM(s) Oral every 6 hours PRN Temp greater or equal to 38C (100.4F), Mild Pain (1 - 3)    Respiratory Medications    Cardiovascular Medications  nitroglycerin    2% Ointment 0.5 Inch(s) Transdermal <User Schedule>    Gastrointestinal Medications  dextrose 5% + sodium chloride 0.45% with potassium chloride 20 mEq/L 1000 milliLiter(s) IV Continuous <Continuous>  pantoprazole  Injectable 40 milliGRAM(s) IV Push every 12 hours  sucralfate 1 Gram(s) Oral four times a day    Genitourinary Medications    Hematologic/Oncologic Medications    Antimicrobial/Immunologic Medications    Endocrine/Metabolic Medications    Topical/Other Medications    --------------------------------------------------------------------------------------  VITAL SIGNS, INS/OUTS (last 24 hours):  --------------------------------------------------------------------------------------  T(C): 36.6 (11-04-21 @ 12:00), Max: 36.9 (11-03-21 @ 21:12)  HR: 95 (11-04-21 @ 12:00) (85 - 95)  BP: 157/79 (11-04-21 @ 12:00) (122/68 - 157/79)  BP(mean): --  ABP: --  ABP(mean): --  RR: 17 (11-04-21 @ 12:00) (16 - 18)  SpO2: 99% (11-04-21 @ 12:00) (98% - 100%)  Wt(kg): --  CVP(mm Hg): --  CI: --  CAPILLARY BLOOD GLUCOSE       N/A      11-03 @ 07:01  -  11-04 @ 07:00  --------------------------------------------------------  IN:    Oral Fluid: 270 mL  Total IN: 270 mL    OUT:    Colostomy (mL): 680 mL    Voided (mL): 650 mL  Total OUT: 1330 mL    Total NET: -1060 mL      11-04 @ 07:01  -  11-04 @ 12:57  --------------------------------------------------------  IN:    dextrose 5% + sodium chloride 0.45% w/ Additives: 100 mL    PRBCs (Packed Red Blood Cells): 300 mL  Total IN: 400 mL    OUT:    Colostomy (mL): 100 mL    Voided (mL): 500 mL  Total OUT: 600 mL    Total NET: -200 mL    -------------------------------------------------------------------------------------    EXAM  NEUROLOGY  Exam: Normal, NAD, alert, oriented x 3, no focal deficits. ***    HEENT  Exam: Normocephalic, atraumatic.  EOMI     RESPIRATORY  Exam: Lungs clear to auscultation, Normal expansion/effort.      CARDIOVASCULAR  Exam: S1, S2.  Regular rate and rhythm. No peripheral edema      GI/NUTRITION  Exam: Abdomen soft, Non-tender, Non-distended. Midline incision C/D/I. Left groin incision C/D/I. Ostomy with dark brown stool in bag.  Current Diet:  NPO     VASCULAR  Exam: Extremities warm, pink, well-perfused. R foot warm. R great toe with dry gangrene.     MUSCULOSKELETAL  Exam: All extremities moving spontaneously without limitations.      SKIN:  Exam: Good skin turgor, no skin breakdown.     METABOLIC/FLUIDS/ELECTROLYTES  dextrose 5% + sodium chloride 0.45% with potassium chloride 20 mEq/L 1000 milliLiter(s) IV Continuous <Continuous>      HEMATOLOGIC  [x] DVT Prophylaxis:   Transfusions:	[] PRBC	[] Platelets		[] FFP	[] Cryoprecipitate    INFECTIOUS DISEASE  Antimicrobials/Immunologic Medications:      Tubes/Lines/Drains    [x] Peripheral IV  [] Central Venous Line     	[] R	[] L	[] IJ	[] Fem	[] SC	Date Placed:   [] Arterial Line		[] R	[] L	[] Fem	[] Rad	[] Ax	Date Placed:   [] PICC:         	[] Midline		[] Mediport  [] Urinary Catheter		Date Placed:     LABS  --------------------------------------------------------------------------------------  ((Insert SICU Labs here))***  --------------------------------------------------------------------------------------    OTHER LABS    IMAGING RESULTS  Echo:   CT:   Xray:     ASSESSMENT:  81y Female ***    PLAN:  ***  Neurologic:   Respiratory:   Cardiovascular:   Gastrointestinal/Nutrition:   Renal/Genitourinary:   Hematologic:   Infectious Disease:   Tubes/Lines/Drains:   Endocrine:   Disposition:     --------------------------------------------------------------------------------------    Critical Care Diagnoses:   SICU Consultation Note  =====================================================  HPI:  81F hx of Raynaud's, lupus, HTN, known L inguinal hernia planned for repair later this year, rectal cancer s/p LAR and diverting transverse colostomy at [facility] 2 weeks ago c/b post op R cold leg on eliquis here with 1 day of hematemesis and melena through ostomy found to have high grade SBO 2/2 L inguinal hernia. Attempted to reduce at bedside after NGT placement, unsuccessful. NGT with dark blood output, GI aware. Vascular surgery no acute plan for R foot at this time given acute GI bleed and SBO. WBC 25, hgb 3 s/p PRBC x 3u, FFP x 2u, KCentra --> 11 --> 9, CTA with run off shows high grade SBO 2/2 L inguinal hernia and 3v occlusion to R foot.    Patient admitted to acute care surgery service for GIB and SBO at left inguinal hernia. Patient taken to OR by acute care surgery with gastroenterology.  Patient taken by GI for EGD performed in operating room at time of planned surgery for incarcerated femoral hernia associated with high-grade small bowel obstruction. EGD noted for large posterior wall duodenal bulbar ulcer. Transient oozing noted with spontaneous cessation. Site of bleeding injected with 2 mL of 1-10,000 epinephrine followed by bipolar cautery. No active bleeding at end of EGD examination. Patient then underwent L inguinal hernia repair with plug/mesh. Bowel was viable.    11/1-11/2 Patient noted to have downtrending H/H to 6.5/18.8. Transfused 1upRBC. Underwent repeat EGD 11/2 which revealed one non-bleeding duodenal ulcer with overlying clot and a visible vessel. Injected with epi. Treated with bipolar cautery. Clip was placed for marking. H/H responded appropriately.     Today patient with anemia again with H/H 6.5/20.1. IR consulted for possible angiogram and embolization of vessel visualized by GI on 11/2. SICU consulted given GIB requiring transfusion and possible IR embolization.    PAST MEDICAL HISTORY:  Hypertension    Osteoporosis    Varicose vein of leg    Raynaud disease        PAST SURGICAL HISTORY:  Inguinal hernia        MEDICATIONS:      ALLERGIES:  ACE inhibitors (Angioedema)  amoxicillin (Angioedema)  irbesartan (Angioedema)  Norvasc (Angioedema)      VITALS & I/Os:  Vital Signs Last 24 Hrs  T(C): 36.7 (28 Oct 2021 16:22), Max: 37 (28 Oct 2021 15:25)  T(F): 98 (28 Oct 2021 16:22), Max: 98.6 (28 Oct 2021 15:25)  HR: 96 (28 Oct 2021 16:22) (68 - 106)  BP: 123/70 (28 Oct 2021 16:22) (80/53 - 123/76)  BP(mean): --  RR: 20 (28 Oct 2021 16:22) (15 - 20)  SpO2: 100% (28 Oct 2021 16:22) (97% - 100%)    I&O's Summary      PHYSICAL EXAM:  General: No acute distress, pleasant oriented  Respiratory: Nonlabored  Cardiovascular: appears well perfused  Abdominal: Soft, nondistended, L groin TTP mild. No rebound or guarding. No organomegaly, no palpable mass.  Extremities: Warm    LABS:                        9.4    26.98 )-----------( 268      ( 28 Oct 2021 17:14 )             27.5     10-28    135  |  105  |  24<H>  ----------------------------<  131<H>  5.2   |  16<L>  |  0.68    Ca    7.8<L>      28 Oct 2021 17:14  Phos  2.8     10-28  Mg     1.50     10-28    TPro  5.0<L>  /  Alb  2.6<L>  /  TBili  0.9  /  DBili  x   /  AST  19  /  ALT  17  /  AlkPhos  82  10-28    Lactate:  10-28 @ 17:08  2.4    PT/INR - ( 28 Oct 2021 17:14 )   PT: 12.5 sec;   INR: 1.09 ratio         PTT - ( 28 Oct 2021 17:14 )  PTT:23.7 sec               (28 Oct 2021 18:30)    Allergies: ACE inhibitors (Angioedema)  amoxicillin (Angioedema)  irbesartan (Angioedema)  Norvasc (Angioedema)    PAST MEDICAL & SURGICAL HISTORY:  Hypertension    Osteoporosis    Varicose vein of leg    Raynaud disease    History of left inguinal hernia    Rectal cancer    Inguinal hernia    History of low anterior resection of rectum  diverting colostomy 10/2021 for rectal cancer      FAMILY HISTORY:  Family history of stroke (Mother)  Mother    Family history of acute myocardial infarction (Father)      HOME MEDICATIONS:    · 	apixaban 2.5 mg oral tablet: Last Dose Taken:  , 1 tab(s) orally 2 times a day  · 	levoFLOXacin 750 mg oral tablet: Last Dose Taken:  , 1 tab(s) orally every 24 hours  · 	loperamide 2 mg oral tablet: Last Dose Taken:  , orally 4 times a day  · 	metoprolol tartrate 25 mg oral tablet: Last Dose Taken:  , 0.5 tab(s) orally every 12 hours  · 	atorvastatin 10 mg oral tablet: Last Dose Taken:  , 1 tab(s) orally once a day  · 	aspirin 81 mg oral delayed release tablet: Last Dose Taken:  , 1 tab(s) orally once a day    CURRENT MEDICATIONS:   --------------------------------------------------------------------------------------  Neurologic Medications  acetaminophen     Tablet .. 650 milliGRAM(s) Oral every 6 hours PRN Temp greater or equal to 38C (100.4F), Mild Pain (1 - 3)    Respiratory Medications    Cardiovascular Medications  nitroglycerin    2% Ointment 0.5 Inch(s) Transdermal <User Schedule>    Gastrointestinal Medications  dextrose 5% + sodium chloride 0.45% with potassium chloride 20 mEq/L 1000 milliLiter(s) IV Continuous <Continuous>  pantoprazole  Injectable 40 milliGRAM(s) IV Push every 12 hours  sucralfate 1 Gram(s) Oral four times a day    Genitourinary Medications    Hematologic/Oncologic Medications    Antimicrobial/Immunologic Medications    Endocrine/Metabolic Medications    Topical/Other Medications    --------------------------------------------------------------------------------------  VITAL SIGNS, INS/OUTS (last 24 hours):  --------------------------------------------------------------------------------------  T(C): 36.6 (11-04-21 @ 12:00), Max: 36.9 (11-03-21 @ 21:12)  HR: 95 (11-04-21 @ 12:00) (85 - 95)  BP: 157/79 (11-04-21 @ 12:00) (122/68 - 157/79)  BP(mean): --  ABP: --  ABP(mean): --  RR: 17 (11-04-21 @ 12:00) (16 - 18)  SpO2: 99% (11-04-21 @ 12:00) (98% - 100%)  Wt(kg): --  CVP(mm Hg): --  CI: --  CAPILLARY BLOOD GLUCOSE       N/A      11-03 @ 07:01  -  11-04 @ 07:00  --------------------------------------------------------  IN:    Oral Fluid: 270 mL  Total IN: 270 mL    OUT:    Colostomy (mL): 680 mL    Voided (mL): 650 mL  Total OUT: 1330 mL    Total NET: -1060 mL      11-04 @ 07:01  -  11-04 @ 12:57  --------------------------------------------------------  IN:    dextrose 5% + sodium chloride 0.45% w/ Additives: 100 mL    PRBCs (Packed Red Blood Cells): 300 mL  Total IN: 400 mL    OUT:    Colostomy (mL): 100 mL    Voided (mL): 500 mL  Total OUT: 600 mL    Total NET: -200 mL    -------------------------------------------------------------------------------------    EXAM  NEUROLOGY  Exam: Normal, NAD, alert, oriented x 3, no focal deficits. ***    HEENT  Exam: Normocephalic, atraumatic.  EOMI     RESPIRATORY  Exam: Lungs clear to auscultation, Normal expansion/effort.      CARDIOVASCULAR  Exam: S1, S2.  Regular rate and rhythm. No peripheral edema      GI/NUTRITION  Exam: Abdomen soft, Non-tender, Non-distended. Midline incision C/D/I. Left groin incision C/D/I. Ostomy with dark brown stool in bag.  Current Diet:  NPO     VASCULAR  Exam: Extremities warm, pink, well-perfused. R foot warm. R great toe with dry gangrene.     MUSCULOSKELETAL  Exam: All extremities moving spontaneously without limitations.      SKIN:  Exam: Good skin turgor, no skin breakdown.     METABOLIC/FLUIDS/ELECTROLYTES  dextrose 5% + sodium chloride 0.45% with potassium chloride 20 mEq/L 1000 milliLiter(s) IV Continuous <Continuous>      HEMATOLOGIC  [x] DVT Prophylaxis:   Transfusions:	[] PRBC	[] Platelets		[] FFP	[] Cryoprecipitate    INFECTIOUS DISEASE  Antimicrobials/Immunologic Medications:      Tubes/Lines/Drains    [x] Peripheral IV  [] Central Venous Line     	[] R	[] L	[] IJ	[] Fem	[] SC	Date Placed:   [] Arterial Line		[] R	[] L	[] Fem	[] Rad	[] Ax	Date Placed:   [] PICC:         	[] Midline		[] Mediport  [] Urinary Catheter		Date Placed:     LABS  --------------------------------------------------------------------------------------                        6.5    6.42  )-----------( 202 ( 04 Nov 2021 06:23 )             20.1   11-04    135  |  104  |  6<L>  ----------------------------<  90  3.9   |  24  |  0.58    Ca    7.9<L>      04 Nov 2021 06:23  Phos  3.2     11-04  Mg     2.00     11-04      --------------------------------------------------------------------------------------     SICU Consultation Note  =====================================================  HPI:  81F hx of Raynaud's, lupus, HTN, known L inguinal hernia planned for repair later this year, rectal cancer s/p LAR and diverting transverse colostomy at [facility] 2 weeks ago c/b post op R cold leg on eliquis here with 1 day of hematemesis and melena through ostomy found to have high grade SBO 2/2 L inguinal hernia. Attempted to reduce at bedside after NGT placement, unsuccessful. NGT with dark blood output, GI aware. Vascular surgery no acute plan for R foot at this time given acute GI bleed and SBO. WBC 25, hgb 3 s/p PRBC x 3u, FFP x 2u, KCentra --> 11 --> 9, CTA with run off shows high grade SBO 2/2 L inguinal hernia and 3v occlusion to R foot.    Patient admitted to acute care surgery service for GIB and SBO at left inguinal hernia. Patient taken to OR by acute care surgery with gastroenterology.  Patient taken by GI for EGD performed in operating room at time of planned surgery for incarcerated femoral hernia associated with high-grade small bowel obstruction. EGD noted for large posterior wall duodenal bulbar ulcer. Transient oozing noted with spontaneous cessation. Site of bleeding injected with 2 mL of 1-10,000 epinephrine followed by bipolar cautery. No active bleeding at end of EGD examination. Patient then underwent L inguinal hernia repair with plug/mesh. Bowel was viable.    11/1-11/2 Patient noted to have downtrending H/H to 6.5/18.8. Transfused 1upRBC. Underwent repeat EGD 11/2 which revealed one non-bleeding duodenal ulcer with overlying clot and a visible vessel. Injected with epi. Treated with bipolar cautery. Clip was placed for marking. H/H responded appropriately.     Today patient with anemia again with H/H 6.5/20.1. IR consulted for possible angiogram and embolization of vessel visualized by GI on 11/2. SICU consulted given GIB requiring transfusion and possible IR embolization.    PAST MEDICAL HISTORY:  Hypertension    Osteoporosis    Varicose vein of leg    Raynaud disease        PAST SURGICAL HISTORY:  Inguinal hernia        MEDICATIONS:      ALLERGIES:  ACE inhibitors (Angioedema)  amoxicillin (Angioedema)  irbesartan (Angioedema)  Norvasc (Angioedema)      VITALS & I/Os:  Vital Signs Last 24 Hrs  T(C): 36.7 (28 Oct 2021 16:22), Max: 37 (28 Oct 2021 15:25)  T(F): 98 (28 Oct 2021 16:22), Max: 98.6 (28 Oct 2021 15:25)  HR: 96 (28 Oct 2021 16:22) (68 - 106)  BP: 123/70 (28 Oct 2021 16:22) (80/53 - 123/76)  BP(mean): --  RR: 20 (28 Oct 2021 16:22) (15 - 20)  SpO2: 100% (28 Oct 2021 16:22) (97% - 100%)    I&O's Summary      PHYSICAL EXAM:  General: No acute distress, pleasant oriented  Respiratory: Nonlabored  Cardiovascular: appears well perfused  Abdominal: Soft, nondistended, L groin TTP mild. No rebound or guarding. No organomegaly, no palpable mass.  Extremities: Warm    LABS:                        9.4    26.98 )-----------( 268      ( 28 Oct 2021 17:14 )             27.5     10-28    135  |  105  |  24<H>  ----------------------------<  131<H>  5.2   |  16<L>  |  0.68    Ca    7.8<L>      28 Oct 2021 17:14  Phos  2.8     10-28  Mg     1.50     10-28    TPro  5.0<L>  /  Alb  2.6<L>  /  TBili  0.9  /  DBili  x   /  AST  19  /  ALT  17  /  AlkPhos  82  10-28    Lactate:  10-28 @ 17:08  2.4    PT/INR - ( 28 Oct 2021 17:14 )   PT: 12.5 sec;   INR: 1.09 ratio         PTT - ( 28 Oct 2021 17:14 )  PTT:23.7 sec               (28 Oct 2021 18:30)    Allergies: ACE inhibitors (Angioedema)  amoxicillin (Angioedema)  irbesartan (Angioedema)  Norvasc (Angioedema)    PAST MEDICAL & SURGICAL HISTORY:  Hypertension    Osteoporosis    Varicose vein of leg    Raynaud disease    History of left inguinal hernia    Rectal cancer    Inguinal hernia    History of low anterior resection of rectum  diverting colostomy 10/2021 for rectal cancer      FAMILY HISTORY:  Family history of stroke (Mother)  Mother    Family history of acute myocardial infarction (Father)      HOME MEDICATIONS:    · 	apixaban 2.5 mg oral tablet: Last Dose Taken:  , 1 tab(s) orally 2 times a day  · 	levoFLOXacin 750 mg oral tablet: Last Dose Taken:  , 1 tab(s) orally every 24 hours  · 	loperamide 2 mg oral tablet: Last Dose Taken:  , orally 4 times a day  · 	metoprolol tartrate 25 mg oral tablet: Last Dose Taken:  , 0.5 tab(s) orally every 12 hours  · 	atorvastatin 10 mg oral tablet: Last Dose Taken:  , 1 tab(s) orally once a day  · 	aspirin 81 mg oral delayed release tablet: Last Dose Taken:  , 1 tab(s) orally once a day    CURRENT MEDICATIONS:   --------------------------------------------------------------------------------------  Neurologic Medications  acetaminophen     Tablet .. 650 milliGRAM(s) Oral every 6 hours PRN Temp greater or equal to 38C (100.4F), Mild Pain (1 - 3)    Respiratory Medications    Cardiovascular Medications  nitroglycerin    2% Ointment 0.5 Inch(s) Transdermal <User Schedule>    Gastrointestinal Medications  dextrose 5% + sodium chloride 0.45% with potassium chloride 20 mEq/L 1000 milliLiter(s) IV Continuous <Continuous>  pantoprazole  Injectable 40 milliGRAM(s) IV Push every 12 hours  sucralfate 1 Gram(s) Oral four times a day    Genitourinary Medications    Hematologic/Oncologic Medications    Antimicrobial/Immunologic Medications    Endocrine/Metabolic Medications    Topical/Other Medications    --------------------------------------------------------------------------------------  VITAL SIGNS, INS/OUTS (last 24 hours):  --------------------------------------------------------------------------------------  T(C): 36.6 (11-04-21 @ 12:00), Max: 36.9 (11-03-21 @ 21:12)  HR: 95 (11-04-21 @ 12:00) (85 - 95)  BP: 157/79 (11-04-21 @ 12:00) (122/68 - 157/79)  BP(mean): --  ABP: --  ABP(mean): --  RR: 17 (11-04-21 @ 12:00) (16 - 18)  SpO2: 99% (11-04-21 @ 12:00) (98% - 100%)  Wt(kg): --  CVP(mm Hg): --  CI: --  CAPILLARY BLOOD GLUCOSE       N/A      11-03 @ 07:01  -  11-04 @ 07:00  --------------------------------------------------------  IN:    Oral Fluid: 270 mL  Total IN: 270 mL    OUT:    Colostomy (mL): 680 mL    Voided (mL): 650 mL  Total OUT: 1330 mL    Total NET: -1060 mL      11-04 @ 07:01  -  11-04 @ 12:57  --------------------------------------------------------  IN:    dextrose 5% + sodium chloride 0.45% w/ Additives: 100 mL    PRBCs (Packed Red Blood Cells): 300 mL  Total IN: 400 mL    OUT:    Colostomy (mL): 100 mL    Voided (mL): 500 mL  Total OUT: 600 mL    Total NET: -200 mL    -------------------------------------------------------------------------------------    EXAM  NEUROLOGY  Exam: Normal, NAD, alert, oriented x 3, no focal deficits. ***    HEENT  Exam: Normocephalic, atraumatic.  EOMI     RESPIRATORY  Exam: Lungs clear to auscultation, Normal expansion/effort.      CARDIOVASCULAR  Exam: S1, S2.  Regular rate and rhythm. No peripheral edema      GI/NUTRITION  Exam: Abdomen soft, Non-tender, Non-distended. Midline incision C/D/I. Left groin incision C/D/I. Ostomy with brown stool in bag.  Current Diet:  NPO     VASCULAR  Exam: Extremities warm, pink, well-perfused. R foot warm. R great toe with dry gangrene.     MUSCULOSKELETAL  Exam: All extremities moving spontaneously without limitations.      SKIN:  Exam: Good skin turgor, no skin breakdown.     METABOLIC/FLUIDS/ELECTROLYTES  dextrose 5% + sodium chloride 0.45% with potassium chloride 20 mEq/L 1000 milliLiter(s) IV Continuous <Continuous>      HEMATOLOGIC  [x] DVT Prophylaxis:   Transfusions:	[] PRBC	[] Platelets		[] FFP	[] Cryoprecipitate    INFECTIOUS DISEASE  Antimicrobials/Immunologic Medications:      Tubes/Lines/Drains    [x] Peripheral IV  [] Central Venous Line     	[] R	[] L	[] IJ	[] Fem	[] SC	Date Placed:   [] Arterial Line		[] R	[] L	[] Fem	[] Rad	[] Ax	Date Placed:   [] PICC:         	[] Midline		[] Mediport  [] Urinary Catheter		Date Placed:     LABS  --------------------------------------------------------------------------------------                        6.5    6.42  )-----------( 202 ( 04 Nov 2021 06:23 )             20.1   11-04    135  |  104  |  6<L>  ----------------------------<  90  3.9   |  24  |  0.58    Ca    7.9<L>      04 Nov 2021 06:23  Phos  3.2     11-04  Mg     2.00     11-04      --------------------------------------------------------------------------------------

## 2021-11-04 NOTE — PROGRESS NOTE ADULT - ASSESSMENT
DAVID QUEEN is a 81y Female with a PPM in place, unclear why, h/o rectal CA s/p rectal surgery with colostomy about 2 weeks prior to presentation at [facility] c/b ?ischemic leg with RLE thrombus started on Eliquis who initially presented to outside hospital with reports coffee ground emesis/melena from colostomy, GI consulted for further evaluation.     Impression:  #. Coffee ground emesis/melena in colostomy s/p EGD with oozing duodenal ulcer s/p epi/bicap 10/28. Repeat EGD 11/2 notable for one non-bleeding duodenal ulcer with overlying clot/visible vessel s/p epi and bipolar cautery and clip, clotted blood in stomach.  #. High grade SBO c/b possible incarcerated hernia s/p Ex lap repair of incarcerated left inguinal hernia with mesh  #. LE Thrombus/PVD  - OSH CTA 10/28 negative for active extravasation     Recommendations:  - CLD today, consider advancing diet tomorrow 11/4 if no signs of active bleeding and Hgb stable  - c/w PPI IV BID for at least   - Trend Hgb, Transfuse if Hgb < 8 considering cardiac history  - Ensure adequate hydration and electrolyte repletion   - Patient is at risk of rebleeding. Hold AC for at least 72 hrs if possible, unless required per primary. May consider hep gtt as patient at increased risk for rebleeding and this can be more quickly discontinued. Will defer final decision to primary, weigh risk vs benefit  - If rebleeds, consult IR. No additional available options from GI standpoint  - Rest of care per primary    Thank you for involving us in this patient's care. Please contact GI if any further questions or concerns.    Case discussed with Attending, Dr. Tom Ramos.    Madison Nunez MD  Gastroenterology/Hepatology Fellow, PGY-V    NON-URGENT CONSULTS:  Please email [email or web address]  [email or web address]  AT NIGHT AND ON WEEKENDS:  Contact on-call GI fellow via answering service ([phone]) from 5pm-8am and on weekends/holidays  MONDAY-FRIDAY 8AM-5PM:  Pager# [phone] ([facility])  GI Phone# [phone] ([facility]) DAVID QUEEN is a 81y Female with a PPM in place, unclear why, h/o rectal CA s/p rectal surgery with colostomy about 2 weeks prior to presentation at [facility] c/b ?ischemic leg with RLE thrombus started on Eliquis who initially presented to outside hospital with reports coffee ground emesis/melena from colostomy, GI consulted for further evaluation.     Impression:  #. Coffee ground emesis/melena in colostomy s/p EGD with oozing duodenal ulcer s/p epi/bicap 10/28. Repeat EGD 11/2 notable for one non-bleeding duodenal ulcer with overlying clot/visible vessel s/p epi and bipolar cautery and clip, clotted blood in stomach.  #. High grade SBO c/b possible incarcerated hernia s/p Ex lap repair of incarcerated left inguinal hernia with mesh  #. LE Thrombus/PVD  - OSH CTA 10/28 negative for active extravasation     Recommendations:  - Drop in Hgb, unable to offer any additional endoscopic intervention from GI  - Agree with plans for embolization with IR today 11/4  - c/w PPI IV BID for at least 72 hrs since 11/2 EGD  - Patient is at risk of rebleeding. Hold AC for at least 72 hrs if possible, unless required per primary. May consider hep gtt as patient at increased risk for rebleeding and this can be more quickly discontinued. Will defer final decision to primary, weigh risk vs benefit  - Trend Hgb, Transfuse if Hgb < 8 considering cardiac history  - Ensure adequate hydration and electrolyte repletion   - Rest of care per primary    Thank you for involving us in this patient's care. Please contact GI if any further questions or concerns. Signing off.    Case discussed with Attending, Dr. Tom Ramos.    Madison Nunez MD  Gastroenterology/Hepatology Fellow, PGY-V    NON-URGENT CONSULTS:  Please email [email or web address]  [email or web address]  AT NIGHT AND ON WEEKENDS:  Contact on-call GI fellow via answering service ([phone]) from 5pm-8am and on weekends/holidays  MONDAY-FRIDAY 8AM-5PM:  Pager# [phone] ([facility])  GI Phone# [phone] ([facility])

## 2021-11-04 NOTE — CONSULT NOTE ADULT - ATTENDING COMMENTS
Patient seen/examined in [facility] ED. Transferred from  with presentation of hemoptysis and Hgb 3.7. Patient being evaluated in ED by General Surgery with plan for OR. Vascular surgery consulted for ischemic right foot. Patient reportedly with pain in right foot since undergoing colon surgery at [facility] 2 weeks ago. Was placed on Eliquis. Presently femoral and popliteal pulses palpable. Pedal pulses non-palpable. Pedal signals not present. Patient presently not a candidate for vascular intervention until GI hemorrhage is evaluated/controlled. Will continue to follow.
Patient with acute blood loss anemia, and upper gi bleed, transferred from [facility] for vascular and ir intervention. Patient found to have incarcerated inguinal hernia with high grade sbo at hernia. Patient hypotensive and tachycardic likely from blood loss. GI called and planned for emergent upper endoscopy and hernia repair.  N mentating, lethargic likely from blood loss  resp on room air  cv hypotensive, tachycardic, hemorrhagic shock , received 3 units prbc, hemoglobin dropped from 11 to 8, plan for intervention with gi  gi, npo, ppi gtt  gu/renal monitor uop  heme scd, monitor hg, acute blood loss anemia from upper gi bleed  id no changes  endo no changes    The patient is a critical care patient with life threatening hemodynamic and metabolic instability in SICU.  I have personally interviewed when possible and examined the patient, reviewed data and laboratory tests/x-rays and all pertinent electronic images.  I was physically present for the key portions of the evaluation and management (E/M) service provided.   The SICU team has a constant risk benefit analyzes discussion with the primary team, all consultants, House Staff and PA's on all decisions.  These diagnoses are unrelated to the surgical procedure noted above.  I meet with family if needed to get further history, discuss the case and make care decisions for this patient who might not be able to participate.  Time involved in performance of separately billable procedures was not counted toward my critical care time. There is no overlap.  I spent 55-75 minutes ( 0800Hrs-0915Hrs in AM/ 1600Hrs-1715Hrs in PM, or other time indicated) of critical care time for the diagnoses, assessment, plan and interventions.  This time excludes time spent on separate procedures and teaching.
History/PE as above. Patient seen/examined. Clinical course discussed with daughter. See full EGD report. EGD performed in operating room at time of planned surgery for incarcerated femoral hernia associated with high-grade small bowel obstruction. EGD noted for large posterior wall duodenal bulbar ulcer. Transient oozing noted with spontaneous cessation. Site of bleeding injected with 2 mL of 1-10,000 epinephrine followed by bipolar cautery. No active bleeding at end of EGD examination. Advised monitoring serial hemoglobin/hematocrit and administering PPI drip 8 mg/hour for 72 hours. In the event of rebleed options include repeat upper endoscopy versus IR intervention. Following hospitalization will assess for Helicobacter pylori with fecal antigen or Helicobacter pylori urea breath test.
I agree with the detailed interval history, physical, and plan, which I have reviewed and edited where appropriate'; also agree with notes/assessment with my team on service.  I have personally examined the patient.  I was physically present for the key portions of the evaluation and management (E/M) service provided.  I reviewed all the pertinent data.  The patient is a critical care patient with life threatening hemodynamic and metabolic instability in SICU.  The SICU team has a constant risk benefit analyzes discussion and coordinating care with the primary team and all consultants.   The patient is in SICU with the chief complaint and diagnosis mentioned in the note.   The plan will be specified in the note.  81y s/p recent LAR and diverting ostomy complicated by RLE ischemia on Eliquis, with active GIB s/p repeat EGD; sp cautery and clipping of duodenal ulcer and a visible vessel identified awaiting IR angiogram, possible embolization. SICU consulted for hemodynamic monitoring in setting of possible GIB.  No acute distress,  Respiratory: clear  Cardiovascular: RR  Abdominal: Soft, nondistended, L groin TTP mild. No rebound or guarding. No organomegaly, no palpable mass.  Extremities: Warm  PLAN:    Neurologic:   -Tylenol  Respiratory:   - RA  Cardiovascular:   - normotensive  Gastrointestinal/Nutrition:   - NPO  - Pending IR angiogram, possible embolization  Renal/Genitourinary:   - Continue IVF   Hematologic:   - f/u post-transfusion CBC  Infectious Disease:   - No active issues  Endocrine:   - No active issues  -Disposition: Surgical floor, no current critical care needs; we will follow    Critical Care Diagnoses: GIB, acute blood loss anemia.

## 2021-11-04 NOTE — PRE PROCEDURE NOTE - PRE PROCEDURE EVALUATION
Interventional Radiology    HPI: 81y Female with acute blood loss anemia 2/2 UGIB in the setting of bleeding duodenal ulcer here for mesenteric angiogram and possible embolization.     Allergies: ACE inhibitors (Angioedema)  amoxicillin (Angioedema)  irbesartan (Angioedema)  Norvasc (Angioedema)    Medications (Abx/Cardiac/Anticoagulation/Blood Products)    nitroglycerin    2% Ointment: 0.5 Inch(s) Transdermal (11-04 @ 12:45)    Data:    T(C): 36.7  HR: 85  BP: 136/73  RR: 18  SpO2: 100%    Exam  General: No acute distress  Chest: Non labored breathing  Abdomen: Non-distended  Extremities: No swelling, warm    -WBC 6.42 / HgB 6.5 / Hct 20.1 / Plt 202  -Na 135 / Cl 104 / BUN 6 / Glucose 90  -K 3.9 / CO2 24 / Cr 0.58  -ALT -- / Alk Phos -- / T.Bili --  -INR1.00    Imaging: Reviewed.    Plan: 81y Female with acute blood loss anemia 2/2 UGIB in the setting of bleeding duodenal ulcer here for mesenteric angiogram and possible embolization.   - Plan for mesenteric angiogram possible embo    -Risks/Benefits/alternatives explained with the patient and/or healthcare proxy and witnessed informed consent obtained.

## 2021-11-04 NOTE — PROVIDER CONTACT NOTE (CRITICAL VALUE NOTIFICATION) - ACTION/TREATMENT ORDERED:
MD aware. will place appropriate orders. will order PRBC.
MD aware. will proceed with 1 unit PRBC.
Provider aware, will order blood transfusion

## 2021-11-04 NOTE — CHART NOTE - NSCHARTNOTEFT_GEN_A_CORE
Post Interventional Radiology Check    Patient is several hours post mesenteric angiogram and coil embolization of supraduodenal artery by interventional radiology. She is doing well, reports her pain is controlled, and has urinated and had ostomy function since the procedure. Denies any nausea or vomiting.     Vitals    T(C): 36.8 (11-04-21 @ 21:11), Max: 36.8 (11-04-21 @ 21:11)  HR: 80 (11-04-21 @ 21:11) (80 - 95)  BP: 138/80 (11-04-21 @ 21:11) (122/68 - 157/79)  RR: 17 (11-04-21 @ 21:11) (16 - 18)  SpO2: 100% (11-04-21 @ 21:11) (99% - 100%)      11-03 @ 07:01  -  11-04 @ 07:00  --------------------------------------------------------  IN:    Oral Fluid: 270 mL  Total IN: 270 mL    OUT:    Colostomy (mL): 680 mL    Voided (mL): 650 mL  Total OUT: 1330 mL    Total NET: -1060 mL      11-04 @ 07:01  -  11-04 @ 23:14  --------------------------------------------------------  IN:    dextrose 5% + sodium chloride 0.45% w/ Additives: 200 mL    PRBCs (Packed Red Blood Cells): 600 mL  Total IN: 800 mL    OUT:    Colostomy (mL): 100 mL    Incontinent per Collection Bag (mL): 500 mL    Voided (mL): 500 mL  Total OUT: 1100 mL    Total NET: -300 mL          Labs                        11.4   7.47  )-----------( 237      ( 04 Nov 2021 19:27 )             34.6       CBC Full  -  ( 04 Nov 2021 19:27 )  WBC Count : 7.47 K/uL  Hemoglobin : 11.4 g/dL  Hematocrit : 34.6 %  Platelet Count - Automated : 237 K/uL  Mean Cell Volume : 91.3 fL  Mean Cell Hemoglobin : 30.1 pg  Mean Cell Hemoglobin Concentration : 32.9 gm/dL  Auto Neutrophil # : x  Auto Lymphocyte # : x  Auto Monocyte # : x  Auto Eosinophil # : x  Auto Basophil # : x  Auto Neutrophil % : x  Auto Lymphocyte % : x  Auto Monocyte % : x  Auto Eosinophil % : x  Auto Basophil % : x      Physical Exam  General: well appearing, thin, no apparent distress  Abdomen: soft, nontender, ostomy pink and viable, LIH incision c/d/i  Extremities: left radial access site c/d/i, palpable radial pulse      Patient is a 81y old Female s/p mesenteric angiogram by IR with embolization of supraduodenal artery, doing well.     Plan:  -CLD  -OOBAT  --BID protonix   -Trend H/H   -PT:  recommending home PT   -Home beta blocker held per vascular surgery recs, will resume if tachycardic

## 2021-11-04 NOTE — PROGRESS NOTE ADULT - ASSESSMENT
81F w/ Raynaud's recent LAR with diverting ostomy for rectal cancer c/b post-op R cold leg started on Eliquis now here with acute GI bleed s/p EGD with interventions and L incarcerated inguinal hernia s/p repair 10/29. Vascular surgery following for R cold foot.    Plan:   - Optimize medical management for patient (ASA/stain, HTN control, DM control)  - Plan for AC pending clearance by GI and General Surgery  - Resume beta-blocker per primary team   - Continue nitro paste and warming to R foot  - Continue care per primary team    Vascular Surgery  [id number]

## 2021-11-04 NOTE — CHART NOTE - NSCHARTNOTEFT_GEN_A_CORE
Seen and examined after transfer from IR recovery to floor. Underwent mesenteric angio and supraduodenal coil embolization via left radial access.     Comfortable, recovering well, reports pain controlled. Received pRBCs, post transfusion CBC appropriate. Mentating well, abdomen soft, nontender, nondistended. Left radial access site hemostatic.     Vital Signs Last 24 Hrs  T(C): 36.7 (04 Nov 2021 12:55), Max: 36.9 (03 Nov 2021 21:12)  T(F): 98 (04 Nov 2021 12:55), Max: 98.5 (03 Nov 2021 21:12)  HR: 85 (04 Nov 2021 12:55) (85 - 95)  BP: 136/73 (04 Nov 2021 12:55) (122/68 - 157/79)  BP(mean): --  RR: 18 (04 Nov 2021 12:55) (16 - 18)  SpO2: 100% (04 Nov 2021 12:55) (99% - 100%)           I&O's Detail    03 Nov 2021 07:01  -  04 Nov 2021 07:00  --------------------------------------------------------  IN:    Oral Fluid: 270 mL  Total IN: 270 mL    OUT:    Colostomy (mL): 680 mL    Voided (mL): 650 mL  Total OUT: 1330 mL    Total NET: -1060 mL      04 Nov 2021 07:01  -  04 Nov 2021 20:45  --------------------------------------------------------  IN:    dextrose 5% + sodium chloride 0.45% w/ Additives: 200 mL    PRBCs (Packed Red Blood Cells): 600 mL  Total IN: 800 mL    OUT:    Colostomy (mL): 100 mL    Incontinent per Collection Bag (mL): 500 mL    Voided (mL): 500 mL  Total OUT: 1100 mL    Total NET: -300 mL                       11.4   7.47  )-----------( 237      ( 04 Nov 2021 19:27 )             34.6

## 2021-11-04 NOTE — PROVIDER CONTACT NOTE (CRITICAL VALUE NOTIFICATION) - SITUATION
Toxicology called to notify of H/H 6.5/18.8.
Critical Result: Hemoglobin 6.5 and Hematocrit 20.1
Repeat CBC showed H/H 7.0/21.3

## 2021-11-04 NOTE — PROGRESS NOTE ADULT - SUBJECTIVE AND OBJECTIVE BOX
Chief Complaint:  Patient is a 81y old  Female who presents with a chief complaint of GI bleed, SBO 2/2 L inguinal hernia (04 Nov 2021 08:55)    Interval Events:   Hgb 6.5 from 7.8 with brown ostomy output  IR consulted with plans for embolization today    Hospital Medications:  acetaminophen     Tablet .. 650 milliGRAM(s) Oral every 6 hours PRN  dextrose 5% + sodium chloride 0.45% with potassium chloride 20 mEq/L 1000 milliLiter(s) IV Continuous <Continuous>  nitroglycerin    2% Ointment 0.5 Inch(s) Transdermal <User Schedule>  pantoprazole  Injectable 40 milliGRAM(s) IV Push every 12 hours  sucralfate 1 Gram(s) Oral four times a day      ROS:   Complete and normal except as mentioned above.    PHYSICAL EXAM:   Vital Signs:  Vital Signs Last 24 Hrs  T(C): 36.6 (04 Nov 2021 08:35), Max: 36.9 (03 Nov 2021 21:12)  T(F): 97.8 (04 Nov 2021 08:35), Max: 98.5 (03 Nov 2021 21:12)  HR: 90 (04 Nov 2021 08:35) (85 - 93)  BP: 143/76 (04 Nov 2021 08:35) (122/68 - 143/76)  BP(mean): --  RR: 18 (04 Nov 2021 08:35) (16 - 18)  SpO2: 100% (04 Nov 2021 08:35) (98% - 100%)  Daily     Daily     GENERAL: no acute distress  NEURO: alert  HEENT: anicteric sclera, no conjunctival pallor appreciated  CHEST: no respiratory distress, no accessory muscle use  CARDIAC: regular rate, +S1/S2  ABDOMEN: brown ostomy outuput, soft, nondistended, nontender, no rebound or guarding  EXTREMITIES: warm, well perfused, no edema  SKIN: no lesions noted    LABS: reviewed                        6.5    6.42  )-----------( 202      ( 04 Nov 2021 06:23 )             20.1     11-04    135  |  104  |  6<L>  ----------------------------<  90  3.9   |  24  |  0.58    Ca    7.9<L>      04 Nov 2021 06:23  Phos  3.2     11-04  Mg     2.00     11-04          Interval Diagnostic Studies: see sunrise for full report

## 2021-11-04 NOTE — PROCEDURE NOTE - PROCEDURE FINDINGS AND DETAILS
Vascular & Interventional Radiology Post-Procedure Note    Pre-Procedure Diagnosis: Upper GI bleed  Post-Procedure Diagnosis: Same as pre.  Indications for Procedure: Active bleeding    Attending: Dr. Montague  Resident: Dr. Ruffin    Procedure Details/Findings: US demonstrating occluded left radial artery. Left radial artery accessed with micropuncture and recanalized, left radial artery sheath placed. Celiac angiogram and selective catheterization of the supraduodenal artery at the level of a duodenal clip. Successful coil embolization of the supraduodenal artery performed. Left radial artery with good blood return. Left wrist hemostasis achieved with TR band. Full report to follow.  Access (if applicable): Left radial artery    Complications: None   Estimated Blood Loss: Minimal  Specimen: None  Contrast: 80 cc Omni  Sedation: IV sedation by anesthesiology  Patient Condition/Disposition: Stable/IRS then floor    Plan:  1.) Successful coil embolization of the supraduodenal artery   2.) Monitor H/H; transfuse PRN  3.) Rest as per primary team

## 2021-11-04 NOTE — PROGRESS NOTE ADULT - ATTENDING COMMENTS
Pt seen and examined.  Agree with resident eval and plan.  Impression: Duodenal ulcer s/p cautery and clipping for visible vessel.  IR for embolization

## 2021-11-04 NOTE — CONSULT NOTE ADULT - ASSESSMENT
ASSESSMENT:  81y Female ***    PLAN:  ***  Neurologic:   Respiratory:   Cardiovascular:   Gastrointestinal/Nutrition:   Renal/Genitourinary:   Hematologic:   Infectious Disease:   Tubes/Lines/Drains:   Endocrine:   Disposition:     --------------------------------------------------------------------------------------    Critical Care Diagnoses:   ASSESSMENT:  81y Female with PMH of HTN, Raynaud's disease, pacemaker, rectal CA s/p recent LAR and diverting ostomy complicated by RLE ischemia on Eliquis, presenting with melena found to have SBO from left inguinal hernia, now s/p EGD revealing bleeding duodenal ulcer s/p epi/clipping, followed by L inguinal hernia repair with mesh placement 10/29. Developed anemia again 2/2 active GIB s/p repeat EGD 11/2 with cautery and clipping of duodenal ulcer and a visible vessel identified. Today with anemia presumed to be GIB from vessel identified during EGD, now awaiting IR angiogram, possible embolization. SICU consulted for hemodynamic monitoring in setting of possible GIB.    PLAN:    Neurologic:   - No active issues  - Pain control PRN Tylenol    Respiratory:   - Saturating 100% on RA    Cardiovascular:   - Currently HDS; nontachycardic, normotensive    Gastrointestinal/Nutrition:   - NPO  - Pending IR angiogram, possible embolization  - Continue Protonix, Carafate  - Colostomy functioning without melena or BRB  - Monitor ostomy output, quality    Renal/Genitourinary:   - Adequate UOP   - Continue IVF     Hematologic:   - Previously on Eliquis for RLE ischemia  - GIB 2/2 duodenal ulcer s/p 1uPRBC 11/1  - Anemia today with H/H 6.5/20.1  - Actively being transfused 2uPRBC, 1FFP, 1 plt  - f/u post-transfusion CBC  - Chemoprophylaxis for VTE held, continue SCDs    Infectious Disease:   - No active issues    Tubes/Lines/Drains: PIVs    Endocrine:   - No active issues  - Glucose 80s-100s on BMP    Disposition: Surgical floor, no current critical care needs    --------------------------------------------------------------------------------------    Critical Care Diagnoses: GIB

## 2021-11-05 ENCOUNTER — TRANSCRIPTION ENCOUNTER (OUTPATIENT)
Age: 81
End: 2021-11-05

## 2021-11-05 LAB
ANION GAP SERPL CALC-SCNC: 12 MMOL/L — SIGNIFICANT CHANGE UP (ref 7–14)
BUN SERPL-MCNC: 4 MG/DL — LOW (ref 7–23)
CALCIUM SERPL-MCNC: 8.2 MG/DL — LOW (ref 8.4–10.5)
CHLORIDE SERPL-SCNC: 100 MMOL/L — SIGNIFICANT CHANGE UP (ref 98–107)
CO2 SERPL-SCNC: 21 MMOL/L — LOW (ref 22–31)
CREAT SERPL-MCNC: 0.5 MG/DL — SIGNIFICANT CHANGE UP (ref 0.5–1.3)
GLUCOSE SERPL-MCNC: 86 MG/DL — SIGNIFICANT CHANGE UP (ref 70–99)
HCT VFR BLD CALC: 33.3 % — LOW (ref 34.5–45)
HGB BLD-MCNC: 11.1 G/DL — LOW (ref 11.5–15.5)
MAGNESIUM SERPL-MCNC: 1.8 MG/DL — SIGNIFICANT CHANGE UP (ref 1.6–2.6)
MCHC RBC-ENTMCNC: 30 PG — SIGNIFICANT CHANGE UP (ref 27–34)
MCHC RBC-ENTMCNC: 33.3 GM/DL — SIGNIFICANT CHANGE UP (ref 32–36)
MCV RBC AUTO: 90 FL — SIGNIFICANT CHANGE UP (ref 80–100)
NRBC # BLD: 0 /100 WBCS — SIGNIFICANT CHANGE UP
NRBC # FLD: 0 K/UL — SIGNIFICANT CHANGE UP
PHOSPHATE SERPL-MCNC: 2.7 MG/DL — SIGNIFICANT CHANGE UP (ref 2.5–4.5)
PLATELET # BLD AUTO: 240 K/UL — SIGNIFICANT CHANGE UP (ref 150–400)
POTASSIUM SERPL-MCNC: 3.7 MMOL/L — SIGNIFICANT CHANGE UP (ref 3.5–5.3)
POTASSIUM SERPL-SCNC: 3.7 MMOL/L — SIGNIFICANT CHANGE UP (ref 3.5–5.3)
RBC # BLD: 3.7 M/UL — LOW (ref 3.8–5.2)
RBC # FLD: 21.6 % — HIGH (ref 10.3–14.5)
SODIUM SERPL-SCNC: 133 MMOL/L — LOW (ref 135–145)
SURGICAL PATHOLOGY STUDY: SIGNIFICANT CHANGE UP
WBC # BLD: 6.8 K/UL — SIGNIFICANT CHANGE UP (ref 3.8–10.5)
WBC # FLD AUTO: 6.8 K/UL — SIGNIFICANT CHANGE UP (ref 3.8–10.5)

## 2021-11-05 PROCEDURE — 99232 SBSQ HOSP IP/OBS MODERATE 35: CPT | Mod: GC

## 2021-11-05 RX ORDER — POTASSIUM CHLORIDE 20 MEQ
10 PACKET (EA) ORAL ONCE
Refills: 0 | Status: COMPLETED | OUTPATIENT
Start: 2021-11-05 | End: 2021-11-05

## 2021-11-05 RX ORDER — MAGNESIUM SULFATE 500 MG/ML
1 VIAL (ML) INJECTION ONCE
Refills: 0 | Status: COMPLETED | OUTPATIENT
Start: 2021-11-05 | End: 2021-11-05

## 2021-11-05 RX ADMIN — Medication 1 GRAM(S): at 23:06

## 2021-11-05 RX ADMIN — PANTOPRAZOLE SODIUM 40 MILLIGRAM(S): 20 TABLET, DELAYED RELEASE ORAL at 05:52

## 2021-11-05 RX ADMIN — Medication 100 GRAM(S): at 10:47

## 2021-11-05 RX ADMIN — Medication 0.5 INCH(S): at 17:55

## 2021-11-05 RX ADMIN — Medication 1 GRAM(S): at 14:58

## 2021-11-05 RX ADMIN — Medication 0.5 INCH(S): at 02:57

## 2021-11-05 RX ADMIN — Medication 100 MILLIEQUIVALENT(S): at 10:28

## 2021-11-05 RX ADMIN — PANTOPRAZOLE SODIUM 40 MILLIGRAM(S): 20 TABLET, DELAYED RELEASE ORAL at 18:21

## 2021-11-05 RX ADMIN — Medication 0.5 INCH(S): at 05:51

## 2021-11-05 RX ADMIN — Medication 1 GRAM(S): at 18:22

## 2021-11-05 RX ADMIN — Medication 0.5 INCH(S): at 13:42

## 2021-11-05 RX ADMIN — Medication 1 GRAM(S): at 05:52

## 2021-11-05 NOTE — DISCHARGE NOTE PROVIDER - NSDCCPTREATMENT_GEN_ALL_CORE_FT
PRINCIPAL PROCEDURE  Procedure: Repair, hernia, inguinal, incarcerated, open, using mesh, adult  Findings and Treatment:       SECONDARY PROCEDURE  Procedure: UGI endoscopy  Findings and Treatment:

## 2021-11-05 NOTE — DIETITIAN INITIAL EVALUATION ADULT. - CONTINUE CURRENT NUTRITION CARE PLAN
Per daughter, will continue on regular diet to maximize menu options, she is familiar to low fiber diet and foods mother can tolerate. She is filling out selective menu./yes

## 2021-11-05 NOTE — DIETITIAN INITIAL EVALUATION ADULT. - ORAL INTAKE PTA/DIET HISTORY
Per daughter, patient was eating better prior to Oct 2021. States she consume good po intake of nutrient dense foods and adheres to Low fiber diet at home. However, last 1 month her appetite and PO intake declined relates to medical condition and hospitalization. Usual weight reported 109lbs and went down to 100lbs over last 1 month. This admission weight of 120.1lbs (10/29), 122.7lbs (10/30), 114.6lbs (10/31) likely weight error per daughter.

## 2021-11-05 NOTE — DISCHARGE NOTE PROVIDER - CARE PROVIDERS DIRECT ADDRESSES
,[email or web address]

## 2021-11-05 NOTE — DIETITIAN INITIAL EVALUATION ADULT. - OTHER INFO
Met with patient and daughter at bedside. Patient reports fair appetite and PO intake, consumed ~75% of breakfast today. Patient denies any nausea/vomiting or difficulty chewing and swallowing. +Colostomy. NKFA. Importance of having well balanced nutrient and protein dense foods. Offered po supplement of Ensure Enlive and magic cup which she was amenable to try.

## 2021-11-05 NOTE — DISCHARGE NOTE PROVIDER - PROVIDER TOKENS
PROVIDER:[TOKEN:[[id number]]] PROVIDER:[TOKEN:[[id number]],FOLLOWUP:[2 weeks]] PROVIDER:[TOKEN:[[id number]],FOLLOWUP:[2 weeks]],PROVIDER:[TOKEN:[[id number]],FOLLOWUP:[1 week]] PROVIDER:[TOKEN:[[id number]],FOLLOWUP:[2 weeks]],PROVIDER:[TOKEN:[[id number]],FOLLOWUP:[1 week]],PROVIDER:[TOKEN:[[id number]]]

## 2021-11-05 NOTE — PROGRESS NOTE ADULT - SUBJECTIVE AND OBJECTIVE BOX
Interventional Radiology Follow-Up Note    This is a 81y Female with UGIB 2/2 to duodenal ulcers s/p mesenteric angio and supraduodenal artery embolization on 11/4/21 in Interventional Radiology with Dr. Montague.     S: Patient seen and examined @ bedside. No complaints offered. Doing well.     Medication:   furosemide   Injectable: (11-04)  nitroglycerin    2% Ointment: (11-05)    Vitals: T(F): 98.2, Max: 98.7 (02:15)  HR: 79  BP: 138/88  RR: 17  SpO2: 99%    Physical Exam:  General: Nontoxic, in NAD, A&O x3.  Abdomen: soft, NTND, no peritoneal signs.  Extremities:  Left radial artery pulse 1+. Dressing clean and dry. No pedal edema or calf tenderness noted.    LABS:  WBC 6.80 / Hgb 11.1 / Hct 33.3 / Plt 240  Na 133 / K 3.7 / CO2 21 / Cl 100 / BUN 4 / Cr 0.50 / Glucose 86  ALT -- / AST -- / Alk Phos -- / Tbili --  Ptt -- / Pt -- / INR --

## 2021-11-05 NOTE — DISCHARGE NOTE PROVIDER - NSDCMRMEDTOKEN_GEN_ALL_CORE_FT
apixaban 2.5 mg oral tablet: 1 tab(s) orally 2 times a day  aspirin 81 mg oral delayed release tablet: 1 tab(s) orally once a day  atorvastatin 10 mg oral tablet: 1 tab(s) orally once a day  levoFLOXacin 750 mg oral tablet: 1 tab(s) orally every 24 hours  loperamide 2 mg oral tablet: orally 4 times a day  metoprolol tartrate 25 mg oral tablet: 0.5 tab(s) orally every 12 hours   acetaminophen 325 mg oral tablet: 2 tab(s) orally every 6 hours, As needed, Temp greater or equal to 38C (100.4F), Mild Pain (1 - 3)  apixaban 2.5 mg oral tablet: 1 tab(s) orally 2 times a day  aspirin 81 mg oral delayed release tablet: 1 tab(s) orally once a day  atorvastatin 10 mg oral tablet: 1 tab(s) orally once a day  Carafate 1 g oral tablet: 1 tab(s) orally 4 times a day  metoprolol tartrate 25 mg oral tablet: 0.5 tab(s) orally every 12 hours  Protonix 40 mg oral delayed release tablet: 1 tab(s) orally 2 times a day   acetaminophen 325 mg oral tablet: 2 tab(s) orally every 6 hours, As needed, Temp greater or equal to 38C (100.4F), Mild Pain (1 - 3)  apixaban 2.5 mg oral tablet: 1 tab(s) orally 2 times a day  aspirin 81 mg oral delayed release tablet: 1 tab(s) orally once a day  atorvastatin 10 mg oral tablet: 1 tab(s) orally once a day  Carafate 1 g oral tablet: 1 tab(s) orally 4 times a day  clarithromycin 500 mg oral tablet: 1 tab(s) orally every 12 hours   metoprolol tartrate 25 mg oral tablet: 0.5 tab(s) orally every 12 hours  metroNIDAZOLE 500 mg oral tablet: 1 tab(s) orally 3 times a day   Protonix 40 mg oral delayed release tablet: 1 tab(s) orally 2 times a day

## 2021-11-05 NOTE — PROGRESS NOTE ADULT - SUBJECTIVE AND OBJECTIVE BOX
ANESTHESIA POSTOP CHECK    81y Female POSTOP DAY 1     No COMPLAINTS    NO APPARENT ANESTHESIA COMPLICATIONS

## 2021-11-05 NOTE — PROGRESS NOTE ADULT - SUBJECTIVE AND OBJECTIVE BOX
Chief Complaint:  Patient is a 81y old  Female who presents with a chief complaint of GI bleed, SBO 2/2 L inguinal hernia (05 Nov 2021 12:42)    Interval Events:   s/p successful embolization of 2 segmental branches of the supraduodenal A; angiography of the celiac, gastroduodenal and supraduodenal arteries demonstrates branch vessels arising from the supraduodenal artery extending to an endoscopically placed tip with possible blush of extravasation.  HDS  Hgb 11.1 from 11.4 with brown ostomy output    Hospital Medications:  acetaminophen     Tablet .. 650 milliGRAM(s) Oral every 6 hours PRN  nitroglycerin    2% Ointment 0.5 Inch(s) Transdermal <User Schedule>  pantoprazole  Injectable 40 milliGRAM(s) IV Push every 12 hours  sucralfate 1 Gram(s) Oral four times a day      ROS:   Complete and normal except as mentioned above.    PHYSICAL EXAM:   Vital Signs:  Vital Signs Last 24 Hrs  T(C): 36.6 (05 Nov 2021 14:49), Max: 37.1 (05 Nov 2021 02:15)  T(F): 97.9 (05 Nov 2021 14:49), Max: 98.7 (05 Nov 2021 02:15)  HR: 86 (05 Nov 2021 14:49) (79 - 86)  BP: 111/61 (05 Nov 2021 14:49) (111/61 - 138/88)  BP(mean): --  RR: 16 (05 Nov 2021 14:49) (16 - 17)  SpO2: 98% (05 Nov 2021 14:49) (96% - 100%)  Daily     Daily     GENERAL: no acute distress  NEURO: alert  HEENT: anicteric sclera, no conjunctival pallor appreciated  CHEST: no respiratory distress, no accessory muscle use  CARDIAC: regular rate, +S1/S2  ABDOMEN: brown ostomy output, soft, nondistended, nontender, no rebound or guarding  EXTREMITIES: warm, well perfused, no edema  SKIN: no lesions noted    LABS: reviewed                        11.1   6.80  )-----------( 240      ( 05 Nov 2021 06:43 )             33.3     11-05    133<L>  |  100  |  4<L>  ----------------------------<  86  3.7   |  21<L>  |  0.50    Ca    8.2<L>      05 Nov 2021 06:43  Phos  2.7     11-05  Mg     1.80     11-05          Interval Diagnostic Studies: see sunrise for full report

## 2021-11-05 NOTE — DISCHARGE NOTE PROVIDER - HOSPITAL COURSE
81F hx of Raynaud's, lupus, HTN, known L inguinal hernia planned for repair later this year, rectal cancer s/p LAR and diverting transverse colostomy at [facility] 2 weeks ago c/b post op R cold leg on eliquis here with 1 day of hematemesis and melena through ostomy found to have high grade SBO 2/2 L inguinal hernia. Attempted to reduce at bedside after NGT placement, unsuccessful. NGT with dark blood output, GI aware. Vascular surgery no acute plan for R foot at this time given acute GI bleed and SBO. WBC 25, hgb 3 s/p PRBC x 3u, FFP x 2u, KCentra --> 11 --> 9, CTA with run off shows high grade SBO 2/2 L inguinal hernia and 3v occlusion to R foot.    has known about L inguinal hernia, has reduced it on her own in the past once before said she was planned for surgery for L IHR. Per daughter, patient was to take 14 days of Levaquin upon discharged (10/26-11/9) for UTI found prior to discharge.     Pt was taken to OR on 10/29 combined case with GI. GI did EGD which showed a bleeding duodenal ulcer that was cauterized. Pt then had her incarcerated inguinal hernia repaired with mesh by surgery. Pt was brought to SICU briefly postoperatively for closer monitoring.     On 11/1 pt had significant decrease in h/h requiring 1 unit PRBC. Pt was taken back to endoscopy with GI on 11/2 for which they found One non-bleeding duodenal ulcer with overlying clot . The area was cauterized and the vessel was clipped for marking. IR was consulted for further intervention and the patient was subsequently taken for embolization on 11/4.   During hospital course patients diet was slowly advanced as tolerated.       ****COMPLETE 11/5****     At this time, pt is tolerating a regular diet, ambulating and voiding.  Pt has been deemed stable for discharge at this time.   81F hx of Raynaud's, lupus, HTN, known L inguinal hernia planned for repair later this year, rectal cancer s/p LAR and diverting transverse colostomy at [facility] 2 weeks ago c/b post op R cold leg on eliquis here with 1 day of hematemesis and melena through ostomy found to have high grade SBO 2/2 L inguinal hernia. Attempted to reduce at bedside after NGT placement, unsuccessful. NGT with dark blood output, GI aware. Vascular surgery no acute plan for R foot at this time given acute GI bleed and SBO. WBC 25, hgb 3 s/p PRBC x 3u, FFP x 2u, KCentra --> 11 --> 9, CTA with run off shows high grade SBO 2/2 L inguinal hernia and 3v occlusion to R foot.    has known about L inguinal hernia, has reduced it on her own in the past once before said she was planned for surgery for L IHR. Per daughter, patient was to take 14 days of Levaquin upon discharged (10/26-11/9) for UTI found prior to discharge.     Pt was taken to OR on 10/29 combined case with GI. GI did EGD which showed a bleeding duodenal ulcer that was cauterized. Pt then had her incarcerated inguinal hernia repaired with mesh by surgery. Pt was brought to SICU briefly postoperatively for closer monitoring.     On 11/1 pt had significant decrease in h/h requiring 1 unit PRBC. Pt was taken back to endoscopy with GI on 11/2 for which they found One non-bleeding duodenal ulcer with overlying clot . The area was cauterized and the vessel was clipped for marking. IR was consulted for further intervention and the patient was subsequently taken for embolization on 11/4.   During hospital course patients diet was slowly advanced as tolerated.     On 11/6, a heparin drip was started, and the patient's hemoglobin remained stable. She had no signs of bleeding, and ostomy output was brown and non-melanotic.    On 11/7, the heparin drip was stopped, and she was restarted on Eliquis 2.5mg BID.    At this time, pt is tolerating a regular diet, ambulating and voiding appropriately, pain is controlled, and she is afebrile with stable vital signs.  Pt has been deemed stable for discharge at this time.   81F hx of Raynaud's, lupus, HTN, known L inguinal hernia planned for repair later this year, rectal cancer s/p LAR and diverting transverse colostomy at [facility] 2 weeks ago c/b post op R cold leg on eliquis here with 1 day of hematemesis and melena through ostomy found to have high grade SBO 2/2 L inguinal hernia. Attempted to reduce at bedside after NGT placement, unsuccessful. NGT with dark blood output, GI aware. Vascular surgery no acute plan for R foot at this time given acute GI bleed and SBO. WBC 25, hgb 3 s/p PRBC x 3u, FFP x 2u, KCentra --> 11 --> 9, CTA with run off shows high grade SBO 2/2 L inguinal hernia and 3v occlusion to R foot.    has known about L inguinal hernia, has reduced it on her own in the past once before said she was planned for surgery for L IHR. Per daughter, patient was to take 14 days of Levaquin upon discharged (10/26-11/9) for UTI found prior to discharge.     Pt was taken to OR on 10/29 combined case with GI. GI did EGD which showed a bleeding duodenal ulcer that was cauterized. Pt then had her incarcerated inguinal hernia repaired with mesh by surgery. Pt was brought to SICU briefly postoperatively for closer monitoring.     On 11/1-11/5 pt had significant decrease in h/h requiring 1 unit PRBC. Pt was taken back to endoscopy with GI on 11/2 for which they found One non-bleeding duodenal ulcer with overlying clot . The area was cauterized and the vessel was clipped for marking.   Patient's h/h continued to trend down so IR was consulted for further intervention and the patient was subsequently taken for embolization on 11/4.   During hospital course patients diet was slowly advanced as tolerated.     On 11/6, a heparin drip was started, and the patient's hemoglobin remained stable. She had no signs of bleeding, and ostomy output was brown and non-melanotic.    On 11/7, the heparin drip was stopped, and she was restarted on Eliquis 2.5mg BID.    11/8 - From B Team Surgery perspective, patient was stable for discharge home however, Vascular Surgery still planned Angiogram to evaluate Right first toe necrosis.  Therefore Eliquis was again d/c'd, patient was placed back on Heparin drip, and patient was transferred to Vascular Surgery service.      11/9 - Patient was maintained on Heparin drip and preopped/consented for Angiogram    11/10 -  Right Lower Extremity Angiogram : RIGHT LOWER EXTREMITY VESSELS: Right common iliac: The vessel was patent.  Right internal iliac: The vessel was patent. Right external iliac: The  vessel was patent. Right common femoral: The vessel was patent. Right  superficial femoral: The vessel was patent. Right deep femoral: The vessel  was patent. Right popliteal: The vessel was patent. Right anterior tibial:  The vessel was occluded. Right tibio-peroneal: The vessel was patent.  Right posterior tibial: There was a 100 % stenosis. Right posterior  tibial: Multiple areas of occlusion and stenosis including proximal calf,  distal calf, ankle (long segment).  PTA with 2.5mm balloon of proximal lesions. Right peroneal: Occludes  mid-calf. Trickle flow in proximal ankle.  Patient was returned to Floor after procedure and placed back on heparin drip.  Diet was advanced and tolerated.  Patient is voiding.  Patient and family would like to go home with home PT/rolling walker.     11/11 - Heparin drip was discontinued and patient was restarted on home Eliquis dose.  Patient tolerated well.       At this time, pt is tolerating a regular diet, ambulating with assistance/rolling walker and voiding appropriately, pain is controlled, and she is afebrile with stable vital signs.  Pt has been deemed stable for discharge at this time.

## 2021-11-05 NOTE — DISCHARGE NOTE PROVIDER - CARE PROVIDER_API CALL
Shade Jones)  Surgery  Critical Care  [address] [place] [address]  Phone: [phone]  Fax: [phone]  Follow Up Time:    Shade Jones)  Surgery  Critical Care  [address] [place] [address]  Phone: [phone]  Fax: [phone]  Follow Up Time: 2 weeks   Shade Jones)  Surgery  Critical Care  [address] [place] [address]  Phone: [phone]  Fax: [phone]  Follow Up Time: 2 weeks    Shaina Araujo)  Surgery  [address] [place] [address], [address]  [place] [address]  Phone: [phone]  Fax: [phone]  Follow Up Time: 1 week   Shade Jones)  Surgery  Critical Care  [address] [place] [address]  Phone: [phone]  Fax: [phone]  Follow Up Time: 2 weeks    Shaina Araujo)  Surgery  [address] [place] [address], [address]  [place] [address]  Phone: [phone]  Fax: [phone]  Follow Up Time: 1 week    Tom Ramos)  Gastroenterology; Internal Medicine  [address] [place] [address]  Phone: [phone]  Fax: [phone]  Follow Up Time:

## 2021-11-05 NOTE — DISCHARGE NOTE PROVIDER - NSDCFUADDAPPT_GEN_ALL_CORE_FT
Please follow up in [facility] [phone].  Call today for appointment as soon as possible.    Please follow up with Dr. Araujo (Vascular Surgeon).  Call today for appointment in 1 week.    Please follow up with your Rheumatologist.  Call today for appointment in 1 week for your Reynauds disease.    Please Follow up with Podiatry.  Call today for appointment in 1 week.      Please Follow up with Dr. Shade Jones (General Surgeon for Hernia/Bowel Obstruction).  Call today for appointment in 1 week.      Please follow up with Gastroenterology.  Call today for appointment in 1 week.   Please follow up in [facility] [phone].  Call today for appointment as soon as possible.    Please follow up with Dr. Araujo (Vascular Surgeon).  Call today for appointment in 1 week.    Please follow up with your Rheumatologist.  Call today for appointment in 1 week for your Reynauds disease.    Please Follow up with Podiatry.  Call today for appointment in 1 week.      Please Follow up with Dr. Shade Jones (General Surgeon for Hernia/Bowel Obstruction).  Call today for appointment in 1 week.      You are being treated for H Pylori infection that was diagnosed during your EGD by GI. Please finish all antibiotics as prescribed. Please follow up with Gastroenterology - Dr. Tom Ramos to make sure the infection has been cleared.  Call today for appointment in 1 week.  [phone]

## 2021-11-05 NOTE — PROGRESS NOTE ADULT - ASSESSMENT
Assessment/Plan:  81y Female with UGIB 2/2 bleeding duodenal ulcer s/p mesenteric angio and supraduodenal artery embolization on 11/4/21. Doing well. H/H stable, HD stable.    -continue global management per primary team  -monitor h/h; transfuse as needed  -trend vs/labs  -Please call IR with any questions or concerns

## 2021-11-05 NOTE — PROGRESS NOTE ADULT - ASSESSMENT
DAVID QUEEN is a 81y Female with a PPM in place, unclear why, h/o rectal CA s/p rectal surgery with colostomy about 2 weeks prior to presentation at [facility] c/b ?ischemic leg with RLE thrombus started on Eliquis who initially presented to outside hospital with reports coffee ground emesis/melena from colostomy, GI consulted for further evaluation.     Impression:  #. Coffee ground emesis/melena in colostomy s/p EGD with oozing duodenal ulcer s/p epi/bicap 10/28. Repeat EGD 11/2 notable for one non-bleeding duodenal ulcer with overlying clot/visible vessel s/p epi and bipolar cautery and clip, clotted blood in stomach. Now s/p successful embolization of 2 segmental branches of the supraduodenal A; angiography of the celiac, gastroduodenal and supraduodenal arteries demonstrates branch vessels arising from the supraduodenal artery extending to an endoscopically placed tip with possible blush of extravasation. Hgb now stable with brown ostomy output.  #. High grade SBO c/b possible incarcerated hernia s/p Ex lap repair of incarcerated left inguinal hernia with mesh  #. LE Thrombus/PVD  - OSH CTA 10/28 negative for active extravasation     Recommendations:  - Consider c/w IV PPI BID with transition to po 40 BID x 8 weeks upon discharge  - May benefit from long-term PPI while on AC  - Patient is at risk of rebleeding. May consider hep gtt as patient at increased risk for rebleeding and this can be more quickly discontinued. Will defer final decision to primary/IR, weigh risk vs benefit  - Trend Hgb, Transfuse if Hgb < 8 considering cardiac history  - Ensure adequate hydration and electrolyte repletion   - Rest of care per primary    Thank you for involving us in this patient's care. Please contact GI if any further questions or concerns.      Case discussed with Attending, Dr. Tom Ramos.    Maidson Nunez MD  Gastroenterology/Hepatology Fellow, PGY-V    NON-URGENT CONSULTS:  Please email [email or web address]  [email or web address]  AT NIGHT AND ON WEEKENDS:  Contact on-call GI fellow via answering service ([phone]) from 5pm-8am and on weekends/holidays  MONDAY-FRIDAY 8AM-5PM:  Pager# [phone] ([facility])  GI Phone# [phone] ([facility])

## 2021-11-05 NOTE — DIETITIAN INITIAL EVALUATION ADULT. - ORAL NUTRITION SUPPLEMENTS
1. Recommend add Ensure Enlive 240mls 1x daily (350 kcal, 20g protein). 2. Foodservice department will provide Magic Cup 2x daily (580kcal, 18gm pro) for trial.

## 2021-11-05 NOTE — PROGRESS NOTE ADULT - ASSESSMENT
Assessment:  81y Female with PMH of HTN, Raynaud's disease, pacemaker, rectal CA s/p recent LAR and diverting ostomy complicated by RLE ischemia on Eliquis, presenting with melena, found to have SBO from left inguinal hernia, now s/p EGD (Found to have bleeding duodenal ulcer) s/p hemostasis and reduction of SBO with mesh placement on 10/29. Underwent repeat EGD 11/2 with cautery and clipping of duodenal ulcer     Plan:  -CLD  -OOBAT  -BID protonix   -Trend H/H   -PT:  recommending home PT   -Home beta blocker held per vascular surgery recs, will resume if tachycardic.      B Team Surgery   [id number]   Assessment:  81y Female with PMH of HTN, Raynaud's disease, pacemaker, rectal CA s/p recent LAR and diverting ostomy complicated by RLE ischemia on Eliquis, presenting with melena, found to have SBO from left inguinal hernia, now s/p EGD (Found to have bleeding duodenal ulcer) s/p hemostasis and reduction of SBO with mesh placement on 10/29. Underwent repeat EGD 11/2 with cautery and clipping of duodenal ulcer. Hgb drop to 6.5 on 11/4, patient underwent embolization by IR and received 3 units PRBC. Now back on the floor doing well.    Plan:  -Advance diet to regular  -OOBAT  -BID protonix   -Trend H/H: Stable, responded well to 3U PRBC yesterday.  -20 lasix given increased volume from transfusion  -PT:  recommending home PT   -Home beta blocker held per vascular surgery recs, will resume if tachycardic.      B Team Surgery   [id number]

## 2021-11-05 NOTE — ADVANCED PRACTICE NURSE CONSULT - RECOMMEDATIONS
Supplies recommended and provided:   Stoma size: 1 1/8"   Stoma powder- item #[id number]    Liquid barrier film    Skin barrier ring- item # [id number]  Flat waffer (2 1/4")- item # [id number]   Drainable pouch (2 1/4")- item # [id number]    Please contact Wound/Ostomy Care Service Line if we can be of further assistance (ext [phone]).  Recommend Nutrition consult     Supplies recommended and provided:   Stoma size: 1 1/8"   Stoma powder- item #7906    Liquid barrier film    Skin barrier ring- item # [id number]  Flat waffer (2 1/4")- item # 86940   Drainable pouch (2 1/4")- item # 11471    Please contact Wound/Ostomy Care Service Line if we can be of further assistance (ext [phone]).

## 2021-11-05 NOTE — PROGRESS NOTE ADULT - ATTENDING COMMENTS
S/p embolization yesterday.   No overt bleeding.   Hgb stabilized in 11s.   PPI, as above.  No further GI interventions planned.

## 2021-11-05 NOTE — PROGRESS NOTE ADULT - SUBJECTIVE AND OBJECTIVE BOX
SURGERY DAILY PROGRESS NOTE:     SUBJECTIVE:   No acute events overnight, patient seen and examined at bedside this AM.     OBJECTIVE:  Vital Signs Last 24 Hrs  T(C): 36.8 (04 Nov 2021 21:11), Max: 36.8 (04 Nov 2021 21:11)  T(F): 98.3 (04 Nov 2021 21:11), Max: 98.3 (04 Nov 2021 21:11)  HR: 80 (04 Nov 2021 21:11) (80 - 95)  BP: 138/80 (04 Nov 2021 21:11) (122/68 - 157/79)  BP(mean): --  RR: 17 (04 Nov 2021 21:11) (16 - 18)  SpO2: 100% (04 Nov 2021 21:11) (99% - 100%)    I&O's Detail    03 Nov 2021 07:01  -  04 Nov 2021 07:00  --------------------------------------------------------  IN:    Oral Fluid: 270 mL  Total IN: 270 mL    OUT:    Colostomy (mL): 680 mL    Voided (mL): 650 mL  Total OUT: 1330 mL    Total NET: -1060 mL      04 Nov 2021 07:01  -  05 Nov 2021 01:59  --------------------------------------------------------  IN:    dextrose 5% + sodium chloride 0.45% w/ Additives: 300 mL    PRBCs (Packed Red Blood Cells): 600 mL  Total IN: 900 mL    OUT:    Colostomy (mL): 200 mL    Incontinent per Collection Bag (mL): 500 mL    Voided (mL): 1000 mL  Total OUT: 1700 mL    Total NET: -800 mL    Physical Exam:  General Appearance:  NAD  Respiratory: Unlabored breathing, no wheezing   Abdomen: Soft,nontender, lower midline incision healing well   Left groin incision c/d/i with dermabond   ostomy: ostomy with melena   Ext: R foot warm. R great toe with dry gangrene. Motion and sensation in foot and ankle intact       LABS:                                      11.4   7.47  )-----------( 237      ( 04 Nov 2021 19:27 )             34.6   11-04    135  |  104  |  6<L>  ----------------------------<  90  3.9   |  24  |  0.58    Ca    7.9<L>      04 Nov 2021 06:23  Phos  3.2     11-04  Mg     2.00     11-04                                               SURGERY DAILY PROGRESS NOTE:     SUBJECTIVE:   No acute events overnight, patient seen and examined at bedside this AM.     OBJECTIVE:  Vital Signs Last 24 Hrs  T(C): 36.8 (04 Nov 2021 21:11), Max: 36.8 (04 Nov 2021 21:11)  T(F): 98.3 (04 Nov 2021 21:11), Max: 98.3 (04 Nov 2021 21:11)  HR: 80 (04 Nov 2021 21:11) (80 - 95)  BP: 138/80 (04 Nov 2021 21:11) (122/68 - 157/79)  BP(mean): --  RR: 17 (04 Nov 2021 21:11) (16 - 18)  SpO2: 100% (04 Nov 2021 21:11) (99% - 100%)    I&O's Detail    03 Nov 2021 07:01  -  04 Nov 2021 07:00  --------------------------------------------------------  IN:    Oral Fluid: 270 mL  Total IN: 270 mL    OUT:    Colostomy (mL): 680 mL    Voided (mL): 650 mL  Total OUT: 1330 mL    Total NET: -1060 mL      04 Nov 2021 07:01  -  05 Nov 2021 01:59  --------------------------------------------------------  IN:    dextrose 5% + sodium chloride 0.45% w/ Additives: 300 mL    PRBCs (Packed Red Blood Cells): 600 mL  Total IN: 900 mL    OUT:    Colostomy (mL): 200 mL    Incontinent per Collection Bag (mL): 500 mL    Voided (mL): 1000 mL  Total OUT: 1700 mL    Total NET: -800 mL    Physical Exam:  General Appearance:  NAD  Respiratory: Unlabored breathing, no wheezing   Abdomen: Soft, nontender, lower midline incision healing well. Left groin incision c/d/i with dermabond   ostomy: ostomy with brown stool and air  Ext: R foot warm. R great toe with dry gangrene. Motion and sensation in foot and ankle intact       LABS:                                      11.4   7.47  )-----------( 237      ( 04 Nov 2021 19:27 )             34.6   11-04    135  |  104  |  6<L>  ----------------------------<  90  3.9   |  24  |  0.58    Ca    7.9<L>      04 Nov 2021 06:23  Phos  3.2     11-04  Mg     2.00     11-04

## 2021-11-05 NOTE — DISCHARGE NOTE PROVIDER - NSDCCPCAREPLAN_GEN_ALL_CORE_FT
PRINCIPAL DISCHARGE DIAGNOSIS  Diagnosis: SBO (small bowel obstruction)  Assessment and Plan of Treatment:       SECONDARY DISCHARGE DIAGNOSES  Diagnosis: GI bleed  Assessment and Plan of Treatment:     Diagnosis: Ischemic leg  Assessment and Plan of Treatment:      PRINCIPAL DISCHARGE DIAGNOSIS  Diagnosis: SBO (small bowel obstruction)  Assessment and Plan of Treatment:       SECONDARY DISCHARGE DIAGNOSES  Diagnosis: GI bleed  Assessment and Plan of Treatment:     Diagnosis: Ischemic leg  Assessment and Plan of Treatment:     Diagnosis: Duodenal ulcer  Assessment and Plan of Treatment:

## 2021-11-05 NOTE — DISCHARGE NOTE PROVIDER - NSDCFUADDINST_GEN_ALL_CORE_FT
WOUND CARE:  Please keep incisions clean and dry. Please do not Scrub or rub incisions. Do not use lotion or powder on incisions  BATHING: Please do not submerge wound underwater. You may shower and/or sponge bathe 48 hours after surgery.  ACTIVITY: No heavy lifting or straining. Otherwise, you may return to your usual level of physical activity. If you are taking narcotic pain medication (such as Oxycodone) DO NOT drive a car, operate machinery or make important decisions.  DIET: Return to your usual diet.  NOTIFY YOUR SURGEON IF: You have any bleeding that does not stop, any pus draining from your wound(s), any fever (over 100.4 F) or chills, persistent nausea/vomiting, persistent diarrhea, or if your pain is not controlled on your discharge pain medications.  FOLLOW-UP: Please follow up with your primary care physician in one week regarding your hospitalization.  Please follow up with your surgeon.  Call today for appointment in 1 week.

## 2021-11-05 NOTE — DIETITIAN INITIAL EVALUATION ADULT. - PERTINENT LABORATORY DATA
11-05 Na133 mmol/L<L> Glu 86 mg/dL K+ 3.7 mmol/L Cr  0.50 mg/dL BUN 4 mg/dL<L> 11-05 Phos 2.7 mg/dL    A1C with Estimated Average Glucose Result: 4.9: High Risk (prediabetic)    5.7 - 6.4 %  Diabetic, diagnostic           > 6.5 %  ADA diabetic treatment goal    < 7.0 %  HbA1C values may not accurately reflect mean blood glucose in patients  with Hb variants.  Suggest clinical correlation. % (10.29.21 @ 01:42)

## 2021-11-06 LAB
ANION GAP SERPL CALC-SCNC: 9 MMOL/L — SIGNIFICANT CHANGE UP (ref 7–14)
APTT BLD: 25 SEC — LOW (ref 27–36.3)
APTT BLD: 29.5 SEC — SIGNIFICANT CHANGE UP (ref 27–36.3)
BUN SERPL-MCNC: 7 MG/DL — SIGNIFICANT CHANGE UP (ref 7–23)
CALCIUM SERPL-MCNC: 8.3 MG/DL — LOW (ref 8.4–10.5)
CHLORIDE SERPL-SCNC: 100 MMOL/L — SIGNIFICANT CHANGE UP (ref 98–107)
CO2 SERPL-SCNC: 23 MMOL/L — SIGNIFICANT CHANGE UP (ref 22–31)
CREAT SERPL-MCNC: 0.58 MG/DL — SIGNIFICANT CHANGE UP (ref 0.5–1.3)
GLUCOSE SERPL-MCNC: 89 MG/DL — SIGNIFICANT CHANGE UP (ref 70–99)
HCT VFR BLD CALC: 32.9 % — LOW (ref 34.5–45)
HCT VFR BLD CALC: 33.4 % — LOW (ref 34.5–45)
HGB BLD-MCNC: 10.8 G/DL — LOW (ref 11.5–15.5)
HGB BLD-MCNC: 10.8 G/DL — LOW (ref 11.5–15.5)
MAGNESIUM SERPL-MCNC: 1.8 MG/DL — SIGNIFICANT CHANGE UP (ref 1.6–2.6)
MCHC RBC-ENTMCNC: 29.6 PG — SIGNIFICANT CHANGE UP (ref 27–34)
MCHC RBC-ENTMCNC: 29.8 PG — SIGNIFICANT CHANGE UP (ref 27–34)
MCHC RBC-ENTMCNC: 32.3 GM/DL — SIGNIFICANT CHANGE UP (ref 32–36)
MCHC RBC-ENTMCNC: 32.8 GM/DL — SIGNIFICANT CHANGE UP (ref 32–36)
MCV RBC AUTO: 90.1 FL — SIGNIFICANT CHANGE UP (ref 80–100)
MCV RBC AUTO: 92.3 FL — SIGNIFICANT CHANGE UP (ref 80–100)
NRBC # BLD: 0 /100 WBCS — SIGNIFICANT CHANGE UP
NRBC # BLD: 0 /100 WBCS — SIGNIFICANT CHANGE UP
NRBC # FLD: 0 K/UL — SIGNIFICANT CHANGE UP
NRBC # FLD: 0 K/UL — SIGNIFICANT CHANGE UP
PHOSPHATE SERPL-MCNC: 2.4 MG/DL — LOW (ref 2.5–4.5)
PLATELET # BLD AUTO: 242 K/UL — SIGNIFICANT CHANGE UP (ref 150–400)
PLATELET # BLD AUTO: 270 K/UL — SIGNIFICANT CHANGE UP (ref 150–400)
POTASSIUM SERPL-MCNC: 3.8 MMOL/L — SIGNIFICANT CHANGE UP (ref 3.5–5.3)
POTASSIUM SERPL-SCNC: 3.8 MMOL/L — SIGNIFICANT CHANGE UP (ref 3.5–5.3)
RBC # BLD: 3.62 M/UL — LOW (ref 3.8–5.2)
RBC # BLD: 3.65 M/UL — LOW (ref 3.8–5.2)
RBC # FLD: 20.4 % — HIGH (ref 10.3–14.5)
RBC # FLD: 20.5 % — HIGH (ref 10.3–14.5)
SODIUM SERPL-SCNC: 132 MMOL/L — LOW (ref 135–145)
WBC # BLD: 5.58 K/UL — SIGNIFICANT CHANGE UP (ref 3.8–10.5)
WBC # BLD: 5.98 K/UL — SIGNIFICANT CHANGE UP (ref 3.8–10.5)
WBC # FLD AUTO: 5.58 K/UL — SIGNIFICANT CHANGE UP (ref 3.8–10.5)
WBC # FLD AUTO: 5.98 K/UL — SIGNIFICANT CHANGE UP (ref 3.8–10.5)

## 2021-11-06 RX ORDER — HEPARIN SODIUM 5000 [USP'U]/ML
1000 INJECTION INTRAVENOUS; SUBCUTANEOUS
Qty: 25000 | Refills: 0 | Status: DISCONTINUED | OUTPATIENT
Start: 2021-11-06 | End: 2021-11-07

## 2021-11-06 RX ORDER — MAGNESIUM SULFATE 500 MG/ML
1 VIAL (ML) INJECTION ONCE
Refills: 0 | Status: COMPLETED | OUTPATIENT
Start: 2021-11-06 | End: 2021-11-06

## 2021-11-06 RX ORDER — HEPARIN SODIUM 5000 [USP'U]/ML
1000 INJECTION INTRAVENOUS; SUBCUTANEOUS
Qty: 25000 | Refills: 0 | Status: DISCONTINUED | OUTPATIENT
Start: 2021-11-06 | End: 2021-11-06

## 2021-11-06 RX ORDER — SODIUM,POTASSIUM PHOSPHATES 278-250MG
1 POWDER IN PACKET (EA) ORAL
Refills: 0 | Status: COMPLETED | OUTPATIENT
Start: 2021-11-06 | End: 2021-11-07

## 2021-11-06 RX ADMIN — Medication 0.5 INCH(S): at 17:21

## 2021-11-06 RX ADMIN — HEPARIN SODIUM 10 UNIT(S)/HR: 5000 INJECTION INTRAVENOUS; SUBCUTANEOUS at 14:04

## 2021-11-06 RX ADMIN — PANTOPRAZOLE SODIUM 40 MILLIGRAM(S): 20 TABLET, DELAYED RELEASE ORAL at 05:04

## 2021-11-06 RX ADMIN — Medication 1 GRAM(S): at 11:44

## 2021-11-06 RX ADMIN — Medication 1 GRAM(S): at 17:22

## 2021-11-06 RX ADMIN — Medication 0.5 INCH(S): at 23:32

## 2021-11-06 RX ADMIN — Medication 0.5 INCH(S): at 05:05

## 2021-11-06 RX ADMIN — Medication 0.5 INCH(S): at 02:00

## 2021-11-06 RX ADMIN — Medication 100 GRAM(S): at 13:48

## 2021-11-06 RX ADMIN — Medication 1 PACKET(S): at 17:22

## 2021-11-06 RX ADMIN — Medication 1 GRAM(S): at 05:05

## 2021-11-06 RX ADMIN — PANTOPRAZOLE SODIUM 40 MILLIGRAM(S): 20 TABLET, DELAYED RELEASE ORAL at 17:22

## 2021-11-06 RX ADMIN — Medication 1 GRAM(S): at 23:09

## 2021-11-06 RX ADMIN — Medication 0.5 INCH(S): at 11:44

## 2021-11-06 NOTE — PROGRESS NOTE ADULT - SUBJECTIVE AND OBJECTIVE BOX
SURGERY DAILY PROGRESS NOTE:     SUBJECTIVE:   No acute events overnight, patient seen and examined at bedside this AM.     OBJECTIVE:  Vital Signs Last 24 Hrs  T(C): 36.7 (05 Nov 2021 21:36), Max: 37.1 (05 Nov 2021 02:15)  T(F): 98 (05 Nov 2021 21:36), Max: 98.7 (05 Nov 2021 02:15)  HR: 98 (05 Nov 2021 21:36) (79 - 98)  BP: 106/74 (05 Nov 2021 21:36) (106/74 - 138/88)  BP(mean): --  RR: 17 (05 Nov 2021 21:36) (16 - 17)  SpO2: 98% (05 Nov 2021 21:36) (96% - 99%)    I&O's Detail    04 Nov 2021 07:01  -  05 Nov 2021 07:00  --------------------------------------------------------  IN:    dextrose 5% + sodium chloride 0.45% w/ Additives: 300 mL    PRBCs (Packed Red Blood Cells): 600 mL  Total IN: 900 mL    OUT:    Colostomy (mL): 400 mL    Incontinent per Collection Bag (mL): 500 mL    Voided (mL): 1600 mL  Total OUT: 2500 mL    Total NET: -1600 mL      05 Nov 2021 07:01  -  06 Nov 2021 01:38  --------------------------------------------------------  IN:  Total IN: 0 mL    OUT:    Colostomy (mL): 100 mL    Voided (mL): 300 mL  Total OUT: 400 mL    Total NET: -400 mL    Physical Exam:  General Appearance:  NAD  Respiratory: Unlabored breathing, no wheezing   Abdomen: Soft, nontender, lower midline incision healing well. Left groin incision c/d/i with dermabond   ostomy: ostomy with brown stool and air  Ext: R foot warm. R great toe with dry gangrene. Motion and sensation in foot and ankle intact       LABS:                                 11.1   6.80  )-----------( 240      ( 05 Nov 2021 06:43 )             33.3   11-05    133<L>  |  100  |  4<L>  ----------------------------<  86  3.7   |  21<L>  |  0.50    Ca    8.2<L>      05 Nov 2021 06:43  Phos  2.7     11-05  Mg     1.80     11-05

## 2021-11-06 NOTE — PROGRESS NOTE ADULT - ASSESSMENT
Assessment:  81y Female with PMH of HTN, Raynaud's disease, pacemaker, rectal CA s/p recent LAR and diverting ostomy complicated by RLE ischemia on Eliquis, presenting with melena, found to have SBO from left inguinal hernia, now s/p EGD (Found to have bleeding duodenal ulcer) s/p hemostasis and reduction of SBO with mesh placement on 10/29. Underwent repeat EGD 11/2 with cautery and clipping of duodenal ulcer. Hgb drop to 6.5 on 11/4, patient underwent embolization by IR and received 3 units PRBC. Now back on the floor doing well.    Plan:  -regular diet  -OOBAT  -BID protonix   -Trend H/H  -PT:  recommending home PT   -Home beta blocker held per vascular surgery recs, will resume if tachycardic.      B Team Surgery   [id number]   Assessment:  81y Female with PMH of HTN, Raynaud's disease, pacemaker, rectal CA s/p recent LAR and diverting ostomy complicated by RLE ischemia on Eliquis, presenting with melena, found to have SBO from left inguinal hernia, now s/p EGD (Found to have bleeding duodenal ulcer) s/p hemostasis and reduction of SBO with mesh placement on 10/29. Underwent repeat EGD 11/2 with cautery and clipping of duodenal ulcer. Hgb drop to 6.5 on 11/4, patient underwent embolization by IR and received 3 units PRBC. Now back on the floor doing well.    Plan:  -regular diet  -OOBAT  -BID protonix   -Trend H/H, if remains stable today will start hep gtt  -PT:  recommending home PT   -Home beta blocker held per vascular surgery recs, will resume if tachycardic.      B Team Surgery   [id number]

## 2021-11-07 LAB
ANION GAP SERPL CALC-SCNC: 10 MMOL/L — SIGNIFICANT CHANGE UP (ref 7–14)
APTT BLD: 112.8 SEC — HIGH (ref 27–36.3)
APTT BLD: 127.7 SEC — SIGNIFICANT CHANGE UP (ref 27–36.3)
BUN SERPL-MCNC: 10 MG/DL — SIGNIFICANT CHANGE UP (ref 7–23)
CALCIUM SERPL-MCNC: 8.5 MG/DL — SIGNIFICANT CHANGE UP (ref 8.4–10.5)
CHLORIDE SERPL-SCNC: 103 MMOL/L — SIGNIFICANT CHANGE UP (ref 98–107)
CO2 SERPL-SCNC: 23 MMOL/L — SIGNIFICANT CHANGE UP (ref 22–31)
CREAT SERPL-MCNC: 0.58 MG/DL — SIGNIFICANT CHANGE UP (ref 0.5–1.3)
GLUCOSE SERPL-MCNC: 95 MG/DL — SIGNIFICANT CHANGE UP (ref 70–99)
HCT VFR BLD CALC: 34.8 % — SIGNIFICANT CHANGE UP (ref 34.5–45)
HCT VFR BLD CALC: 38.9 % — SIGNIFICANT CHANGE UP (ref 34.5–45)
HGB BLD-MCNC: 10.7 G/DL — LOW (ref 11.5–15.5)
HGB BLD-MCNC: 12.1 G/DL — SIGNIFICANT CHANGE UP (ref 11.5–15.5)
MAGNESIUM SERPL-MCNC: 1.8 MG/DL — SIGNIFICANT CHANGE UP (ref 1.6–2.6)
MCHC RBC-ENTMCNC: 28.9 PG — SIGNIFICANT CHANGE UP (ref 27–34)
MCHC RBC-ENTMCNC: 29.1 PG — SIGNIFICANT CHANGE UP (ref 27–34)
MCHC RBC-ENTMCNC: 30.7 GM/DL — LOW (ref 32–36)
MCHC RBC-ENTMCNC: 31.1 GM/DL — LOW (ref 32–36)
MCV RBC AUTO: 93.1 FL — SIGNIFICANT CHANGE UP (ref 80–100)
MCV RBC AUTO: 94.6 FL — SIGNIFICANT CHANGE UP (ref 80–100)
NRBC # BLD: 0 /100 WBCS — SIGNIFICANT CHANGE UP
NRBC # BLD: 0 /100 WBCS — SIGNIFICANT CHANGE UP
NRBC # FLD: 0 K/UL — SIGNIFICANT CHANGE UP
NRBC # FLD: 0 K/UL — SIGNIFICANT CHANGE UP
PHOSPHATE SERPL-MCNC: 2.5 MG/DL — SIGNIFICANT CHANGE UP (ref 2.5–4.5)
PLATELET # BLD AUTO: 271 K/UL — SIGNIFICANT CHANGE UP (ref 150–400)
PLATELET # BLD AUTO: 278 K/UL — SIGNIFICANT CHANGE UP (ref 150–400)
POTASSIUM SERPL-MCNC: 3.9 MMOL/L — SIGNIFICANT CHANGE UP (ref 3.5–5.3)
POTASSIUM SERPL-SCNC: 3.9 MMOL/L — SIGNIFICANT CHANGE UP (ref 3.5–5.3)
RBC # BLD: 3.68 M/UL — LOW (ref 3.8–5.2)
RBC # BLD: 4.18 M/UL — SIGNIFICANT CHANGE UP (ref 3.8–5.2)
RBC # FLD: 19.9 % — HIGH (ref 10.3–14.5)
RBC # FLD: 20.3 % — HIGH (ref 10.3–14.5)
SODIUM SERPL-SCNC: 136 MMOL/L — SIGNIFICANT CHANGE UP (ref 135–145)
WBC # BLD: 4.75 K/UL — SIGNIFICANT CHANGE UP (ref 3.8–10.5)
WBC # BLD: 5.91 K/UL — SIGNIFICANT CHANGE UP (ref 3.8–10.5)
WBC # FLD AUTO: 4.75 K/UL — SIGNIFICANT CHANGE UP (ref 3.8–10.5)
WBC # FLD AUTO: 5.91 K/UL — SIGNIFICANT CHANGE UP (ref 3.8–10.5)

## 2021-11-07 RX ORDER — MAGNESIUM SULFATE 500 MG/ML
1 VIAL (ML) INJECTION ONCE
Refills: 0 | Status: COMPLETED | OUTPATIENT
Start: 2021-11-07 | End: 2021-11-07

## 2021-11-07 RX ORDER — APIXABAN 2.5 MG/1
2.5 TABLET, FILM COATED ORAL
Refills: 0 | Status: DISCONTINUED | OUTPATIENT
Start: 2021-11-07 | End: 2021-11-08

## 2021-11-07 RX ORDER — SODIUM,POTASSIUM PHOSPHATES 278-250MG
1 POWDER IN PACKET (EA) ORAL ONCE
Refills: 0 | Status: COMPLETED | OUTPATIENT
Start: 2021-11-07 | End: 2021-11-07

## 2021-11-07 RX ADMIN — Medication 1 GRAM(S): at 17:01

## 2021-11-07 RX ADMIN — HEPARIN SODIUM 10 UNIT(S)/HR: 5000 INJECTION INTRAVENOUS; SUBCUTANEOUS at 12:55

## 2021-11-07 RX ADMIN — Medication 1 GRAM(S): at 12:06

## 2021-11-07 RX ADMIN — Medication 0.5 INCH(S): at 17:01

## 2021-11-07 RX ADMIN — Medication 1 PACKET(S): at 15:21

## 2021-11-07 RX ADMIN — Medication 0.5 INCH(S): at 06:09

## 2021-11-07 RX ADMIN — PANTOPRAZOLE SODIUM 40 MILLIGRAM(S): 20 TABLET, DELAYED RELEASE ORAL at 06:09

## 2021-11-07 RX ADMIN — Medication 1 GRAM(S): at 06:09

## 2021-11-07 RX ADMIN — Medication 1 PACKET(S): at 06:09

## 2021-11-07 RX ADMIN — Medication 0.5 INCH(S): at 12:06

## 2021-11-07 RX ADMIN — HEPARIN SODIUM 11 UNIT(S)/HR: 5000 INJECTION INTRAVENOUS; SUBCUTANEOUS at 03:58

## 2021-11-07 RX ADMIN — APIXABAN 2.5 MILLIGRAM(S): 2.5 TABLET, FILM COATED ORAL at 17:30

## 2021-11-07 RX ADMIN — Medication 100 GRAM(S): at 15:21

## 2021-11-07 RX ADMIN — PANTOPRAZOLE SODIUM 40 MILLIGRAM(S): 20 TABLET, DELAYED RELEASE ORAL at 17:01

## 2021-11-07 RX ADMIN — Medication 1 GRAM(S): at 23:00

## 2021-11-07 NOTE — PROGRESS NOTE ADULT - SUBJECTIVE AND OBJECTIVE BOX
GENERAL SURGERY PROGRESS NOTE     DAVID QUEEN  81y  Female  Hospital day :10d    OVERNIGHT EVENTS: restarted heparin gtt yesterday, no bleeding noted in ostomy, H/H stable    T(F): 98.1 (11-07-21 @ 06:00), Max: 98.9 (11-06-21 @ 21:37)  HR: 87 (11-07-21 @ 06:00) (70 - 95)  BP: 139/82 (11-07-21 @ 06:00) (106/63 - 139/82)  RR: 17 (11-07-21 @ 06:00) (17 - 18)  SpO2: 99% (11-07-21 @ 06:00) (98% - 100%)    DIET/FLUIDS:     URINE:      GI proph:  pantoprazole  Injectable 40 milliGRAM(s) IV Push every 12 hours    AC/ proph: heparin  Infusion 1000 Unit(s)/Hr IV Continuous <Continuous>    ABx:     PHYSICAL EXAM:  GENERAL: NAD, well-appearing  CHEST/LUNG: Clear to auscultation bilaterally  HEART: Regular rate and rhythm  ABDOMEN: Soft, Nontender, ostomy with brown stool and gas  EXTREMITIES:  No clubbing, cyanosis, or edema    LABS  CAPILLARY BLOOD GLUCOSE                     10.8   5.98  )-----------( 270      ( 06 Nov 2021 20:17 )             32.9       11-06    132<L>  |  100  |  7   ----------------------------<  89  3.8   |  23  |  0.58      Phosphorus Level, Serum: 2.4 mg/dL (11-06-21 @ 08:03)    Coags:     x      ----< x       ( 07 Nov 2021 02:43 )     112.8

## 2021-11-07 NOTE — PROGRESS NOTE ADULT - ASSESSMENT
Assessment:  81y Female with PMH of HTN, Raynaud's disease, pacemaker, rectal CA s/p recent LAR and diverting ostomy complicated by RLE ischemia on Eliquis, presenting with melena, found to have SBO from left inguinal hernia, now s/p EGD (Found to have bleeding duodenal ulcer) s/p hemostasis and reduction of SBO with mesh placement on 10/29. Underwent repeat EGD 11/2 with cautery and clipping of duodenal ulcer. Hgb drop to 6.5 on 11/4, patient underwent embolization by IR and received 3 units PRBC. Now back on the floor doing well.    Plan:  -regular diet  -OOBAT  -BID protonix   - on hep gtt, trend q6 PTT, will switch to eliquis  -Trend H/H  -PT:  recommending home PT   -Home beta blocker held per vascular surgery recs, will resume if tachycardic.

## 2021-11-08 LAB
ANION GAP SERPL CALC-SCNC: 7 MMOL/L — SIGNIFICANT CHANGE UP (ref 7–14)
APTT BLD: 27.7 SEC — SIGNIFICANT CHANGE UP (ref 27–36.3)
APTT BLD: 58.7 SEC — HIGH (ref 27–36.3)
BUN SERPL-MCNC: 13 MG/DL — SIGNIFICANT CHANGE UP (ref 7–23)
CALCIUM SERPL-MCNC: 8.3 MG/DL — LOW (ref 8.4–10.5)
CHLORIDE SERPL-SCNC: 104 MMOL/L — SIGNIFICANT CHANGE UP (ref 98–107)
CO2 SERPL-SCNC: 25 MMOL/L — SIGNIFICANT CHANGE UP (ref 22–31)
CREAT SERPL-MCNC: 0.61 MG/DL — SIGNIFICANT CHANGE UP (ref 0.5–1.3)
GLUCOSE SERPL-MCNC: 93 MG/DL — SIGNIFICANT CHANGE UP (ref 70–99)
HCT VFR BLD CALC: 32 % — LOW (ref 34.5–45)
HCT VFR BLD CALC: 33.3 % — LOW (ref 34.5–45)
HGB BLD-MCNC: 10.8 G/DL — LOW (ref 11.5–15.5)
HGB BLD-MCNC: 9.9 G/DL — LOW (ref 11.5–15.5)
INR BLD: 1 RATIO — SIGNIFICANT CHANGE UP (ref 0.88–1.16)
MAGNESIUM SERPL-MCNC: 1.9 MG/DL — SIGNIFICANT CHANGE UP (ref 1.6–2.6)
MCHC RBC-ENTMCNC: 29.4 PG — SIGNIFICANT CHANGE UP (ref 27–34)
MCHC RBC-ENTMCNC: 30.3 PG — SIGNIFICANT CHANGE UP (ref 27–34)
MCHC RBC-ENTMCNC: 30.9 GM/DL — LOW (ref 32–36)
MCHC RBC-ENTMCNC: 32.4 GM/DL — SIGNIFICANT CHANGE UP (ref 32–36)
MCV RBC AUTO: 93.3 FL — SIGNIFICANT CHANGE UP (ref 80–100)
MCV RBC AUTO: 95 FL — SIGNIFICANT CHANGE UP (ref 80–100)
NRBC # BLD: 0 /100 WBCS — SIGNIFICANT CHANGE UP
NRBC # BLD: 0 /100 WBCS — SIGNIFICANT CHANGE UP
NRBC # FLD: 0 K/UL — SIGNIFICANT CHANGE UP
NRBC # FLD: 0 K/UL — SIGNIFICANT CHANGE UP
PHOSPHATE SERPL-MCNC: 2.4 MG/DL — LOW (ref 2.5–4.5)
PLATELET # BLD AUTO: 240 K/UL — SIGNIFICANT CHANGE UP (ref 150–400)
PLATELET # BLD AUTO: 260 K/UL — SIGNIFICANT CHANGE UP (ref 150–400)
POTASSIUM SERPL-MCNC: 4 MMOL/L — SIGNIFICANT CHANGE UP (ref 3.5–5.3)
POTASSIUM SERPL-SCNC: 4 MMOL/L — SIGNIFICANT CHANGE UP (ref 3.5–5.3)
PROTHROM AB SERPL-ACNC: 11.4 SEC — SIGNIFICANT CHANGE UP (ref 10.6–13.6)
RBC # BLD: 3.37 M/UL — LOW (ref 3.8–5.2)
RBC # BLD: 3.57 M/UL — LOW (ref 3.8–5.2)
RBC # FLD: 19.6 % — HIGH (ref 10.3–14.5)
RBC # FLD: 19.7 % — HIGH (ref 10.3–14.5)
SARS-COV-2 RNA SPEC QL NAA+PROBE: SIGNIFICANT CHANGE UP
SODIUM SERPL-SCNC: 136 MMOL/L — SIGNIFICANT CHANGE UP (ref 135–145)
WBC # BLD: 4.89 K/UL — SIGNIFICANT CHANGE UP (ref 3.8–10.5)
WBC # BLD: 5.94 K/UL — SIGNIFICANT CHANGE UP (ref 3.8–10.5)
WBC # FLD AUTO: 4.89 K/UL — SIGNIFICANT CHANGE UP (ref 3.8–10.5)
WBC # FLD AUTO: 5.94 K/UL — SIGNIFICANT CHANGE UP (ref 3.8–10.5)

## 2021-11-08 RX ORDER — PANTOPRAZOLE SODIUM 20 MG/1
40 TABLET, DELAYED RELEASE ORAL
Refills: 0 | Status: DISCONTINUED | OUTPATIENT
Start: 2021-11-08 | End: 2021-11-11

## 2021-11-08 RX ORDER — HEPARIN SODIUM 5000 [USP'U]/ML
950 INJECTION INTRAVENOUS; SUBCUTANEOUS
Qty: 25000 | Refills: 0 | Status: DISCONTINUED | OUTPATIENT
Start: 2021-11-08 | End: 2021-11-08

## 2021-11-08 RX ORDER — HEPARIN SODIUM 5000 [USP'U]/ML
1000 INJECTION INTRAVENOUS; SUBCUTANEOUS
Qty: 25000 | Refills: 0 | Status: DISCONTINUED | OUTPATIENT
Start: 2021-11-08 | End: 2021-11-10

## 2021-11-08 RX ORDER — POTASSIUM PHOSPHATE, MONOBASIC POTASSIUM PHOSPHATE, DIBASIC 236; 224 MG/ML; MG/ML
15 INJECTION, SOLUTION INTRAVENOUS ONCE
Refills: 0 | Status: COMPLETED | OUTPATIENT
Start: 2021-11-08 | End: 2021-11-08

## 2021-11-08 RX ADMIN — HEPARIN SODIUM 10 UNIT(S)/HR: 5000 INJECTION INTRAVENOUS; SUBCUTANEOUS at 20:00

## 2021-11-08 RX ADMIN — Medication 1 GRAM(S): at 12:28

## 2021-11-08 RX ADMIN — Medication 0.5 INCH(S): at 00:00

## 2021-11-08 RX ADMIN — PANTOPRAZOLE SODIUM 40 MILLIGRAM(S): 20 TABLET, DELAYED RELEASE ORAL at 06:16

## 2021-11-08 RX ADMIN — PANTOPRAZOLE SODIUM 40 MILLIGRAM(S): 20 TABLET, DELAYED RELEASE ORAL at 17:59

## 2021-11-08 RX ADMIN — HEPARIN SODIUM 950 UNIT(S)/HR: 5000 INJECTION INTRAVENOUS; SUBCUTANEOUS at 12:27

## 2021-11-08 RX ADMIN — Medication 1 GRAM(S): at 17:59

## 2021-11-08 RX ADMIN — POTASSIUM PHOSPHATE, MONOBASIC POTASSIUM PHOSPHATE, DIBASIC 62.5 MILLIMOLE(S): 236; 224 INJECTION, SOLUTION INTRAVENOUS at 07:45

## 2021-11-08 RX ADMIN — Medication 0.5 INCH(S): at 06:16

## 2021-11-08 RX ADMIN — Medication 1 GRAM(S): at 06:16

## 2021-11-08 RX ADMIN — Medication 0.5 INCH(S): at 12:28

## 2021-11-08 RX ADMIN — APIXABAN 2.5 MILLIGRAM(S): 2.5 TABLET, FILM COATED ORAL at 06:15

## 2021-11-08 NOTE — PROGRESS NOTE ADULT - SUBJECTIVE AND OBJECTIVE BOX
GENERAL SURGERY PROGRESS NOTE     SUBJECTIVE: No acute events overnight, patient seen and examined at bedside this AM.     OBJECTIVE:  Vital Signs Last 24 Hrs  T(C): 36.9 (11-07-21 @ 21:05), Max: 36.9 (11-07-21 @ 21:05)  T(F): 98.5 (11-07-21 @ 21:05), Max: 98.5 (11-07-21 @ 21:05)  HR: 94 (11-07-21 @ 21:05) (85 - 94)  BP: 99/54 (11-07-21 @ 21:05) (98/55 - 139/82)  BP(mean): --  RR: 18 (11-07-21 @ 21:05) (17 - 18)  SpO2: 98% (11-07-21 @ 21:05) (96% - 100%)    I&O's Detail    06 Nov 2021 08:01  -  07 Nov 2021 07:00  --------------------------------------------------------  IN:    IV PiggyBack: 100 mL  Total IN: 100 mL    OUT:    Colostomy (mL): 350 mL    Voided (mL): 900 mL  Total OUT: 1250 mL    Total NET: -1150 mL      07 Nov 2021 07:01  -  08 Nov 2021 00:52  --------------------------------------------------------  IN:    Heparin: 22 mL    IV PiggyBack: 100 mL    Oral Fluid: 120 mL  Total IN: 242 mL    OUT:    Colostomy (mL): 500 mL    Incontinent per Collection Bag (mL): 0 mL    Voided (mL): 600 mL  Total OUT: 1100 mL    Total NET: -858 mL    PHYSICAL EXAM:  GENERAL: NAD, well-appearing  CHEST/LUNG: Clear to auscultation bilaterally  HEART: Regular rate and rhythm  ABDOMEN: Soft, Nontender, ostomy with brown stool and gas  EXTREMITIES:  No clubbing, cyanosis, or edema    LABS                          12.1   5.91  )-----------( 278      ( 07 Nov 2021 11:14 )             38.9   11-07    136  |  103  |  10  ----------------------------<  95  3.9   |  23  |  0.58    Ca    8.5      07 Nov 2021 07:36  Phos  2.5     11-07  Mg     1.80     11-07

## 2021-11-08 NOTE — CHART NOTE - NSCHARTNOTEFT_GEN_A_CORE
81y Female with PMH of HTN, Raynaud's disease, pacemaker, rectal CA s/p recent LAR and diverting ostomy complicated by RLE ischemia (previously on Eliquis) presented with melena, found to have SBO from left inguinal hernia, now s/p EGD (Found to have bleeding duodenal ulcer) s/p reduction of SBO with mesh placement on 10/29. Pt subsequently underwent repeat EGD 11/2 with cautery and clipping of duodenal ulcer and IR Embolization on 11/4 for continued bleeding. Pt has been hemodynamically stable.   Restarted on AC   Will require Angiogram with Vascular Surgery-Plan for Wednesday   Discussed with Vascular Surgery and patient will be transferred to Dr. Araujo service for further management.

## 2021-11-08 NOTE — PROGRESS NOTE ADULT - ATTENDING SUPERVISION STATEMENT
ACP/Resident
Fellow
ACP/Resident
Fellow
Resident
Resident
Fellow
Resident
ACP/Resident
Fellow

## 2021-11-08 NOTE — CHART NOTE - NSCHARTNOTEFT_GEN_A_CORE
Had extensive discussion with GI, IR and vascular regarding plan of care and risks/benefit profile of AC. Patient was restarted on full AC and has been tolerating without any issues, vitals and H/H remained stable, ostomy output with no evidence of bleed. Vascular surgery planning on angio within this admission.    Plan:  - switched back to heparin gtt as patient is planned for angio with vascular, tomorrow vs wednesday  - ok to give heparin boluses as needed in planned vascular procedure  - NPO after midnight  - COVID swab sent today

## 2021-11-08 NOTE — PROGRESS NOTE ADULT - ASSESSMENT
Assessment:  81y Female with PMH of HTN, Raynaud's disease, pacemaker, rectal CA s/p recent LAR and diverting ostomy complicated by RLE ischemia on Eliquis, presenting with melena, found to have SBO from left inguinal hernia, now s/p EGD (Found to have bleeding duodenal ulcer) s/p hemostasis and reduction of SBO with mesh placement on 10/29. Underwent repeat EGD 11/2 with cautery and clipping of duodenal ulcer. Hgb drop to 6.5 on 11/4, patient underwent embolization by IR and received 3 units PRBC. Now back on the floor doing well.    Plan:  -regular diet  -OOBAT  -BID protonix   - on hep gtt, trend q6 PTT, will switch to eliquis  -Trend H/H  -PT:  recommending home PT   -Home beta blocker held per vascular surgery recs, will resume if tachycardic.    B Team Surgery   [id number] Assessment:  81y Female with PMH of HTN, Raynaud's disease, pacemaker, rectal CA s/p recent LAR and diverting ostomy complicated by RLE ischemia on Eliquis, presenting with melena, found to have SBO from left inguinal hernia, now s/p EGD (Found to have bleeding duodenal ulcer) s/p hemostasis and reduction of SBO with mesh placement on 10/29. Underwent repeat EGD 11/2 with cautery and clipping of duodenal ulcer. Hgb drop to 6.5 on 11/4, patient underwent embolization by IR and received 3 units PRBC. Now back on the floor doing well.    Plan:  -regular diet  -Eliquis 2.5mg BID resumed, will clarify dose with Vascular Surgery  -OOBAT  -BID protonix   -dc'd hep gtt, will recheck PTT  -Trend H/H: has been stable  -PT: recommending home PT   -Home beta blocker held per vascular surgery recs, will resume if tachycardic.  -Plan for discharge today    B Team Surgery   [id number]

## 2021-11-09 LAB
ANION GAP SERPL CALC-SCNC: 6 MMOL/L — LOW (ref 7–14)
APTT BLD: 59.3 SEC — HIGH (ref 27–36.3)
BUN SERPL-MCNC: 24 MG/DL — HIGH (ref 7–23)
CALCIUM SERPL-MCNC: 8.7 MG/DL — SIGNIFICANT CHANGE UP (ref 8.4–10.5)
CHLORIDE SERPL-SCNC: 102 MMOL/L — SIGNIFICANT CHANGE UP (ref 98–107)
CO2 SERPL-SCNC: 24 MMOL/L — SIGNIFICANT CHANGE UP (ref 22–31)
CREAT SERPL-MCNC: 0.56 MG/DL — SIGNIFICANT CHANGE UP (ref 0.5–1.3)
GLUCOSE SERPL-MCNC: 100 MG/DL — HIGH (ref 70–99)
HCT VFR BLD CALC: 29.6 % — LOW (ref 34.5–45)
HGB BLD-MCNC: 9.3 G/DL — LOW (ref 11.5–15.5)
MAGNESIUM SERPL-MCNC: 1.6 MG/DL — SIGNIFICANT CHANGE UP (ref 1.6–2.6)
MCHC RBC-ENTMCNC: 29.8 PG — SIGNIFICANT CHANGE UP (ref 27–34)
MCHC RBC-ENTMCNC: 31.4 GM/DL — LOW (ref 32–36)
MCV RBC AUTO: 94.9 FL — SIGNIFICANT CHANGE UP (ref 80–100)
NRBC # BLD: 0 /100 WBCS — SIGNIFICANT CHANGE UP
NRBC # FLD: 0 K/UL — SIGNIFICANT CHANGE UP
PHOSPHATE SERPL-MCNC: 3 MG/DL — SIGNIFICANT CHANGE UP (ref 2.5–4.5)
PLATELET # BLD AUTO: 192 K/UL — SIGNIFICANT CHANGE UP (ref 150–400)
POTASSIUM SERPL-MCNC: 4.2 MMOL/L — SIGNIFICANT CHANGE UP (ref 3.5–5.3)
POTASSIUM SERPL-SCNC: 4.2 MMOL/L — SIGNIFICANT CHANGE UP (ref 3.5–5.3)
RBC # BLD: 3.12 M/UL — LOW (ref 3.8–5.2)
RBC # FLD: 19.2 % — HIGH (ref 10.3–14.5)
SODIUM SERPL-SCNC: 132 MMOL/L — LOW (ref 135–145)
WBC # BLD: 5.97 K/UL — SIGNIFICANT CHANGE UP (ref 3.8–10.5)
WBC # FLD AUTO: 5.97 K/UL — SIGNIFICANT CHANGE UP (ref 3.8–10.5)

## 2021-11-09 RX ORDER — MAGNESIUM SULFATE 500 MG/ML
2 VIAL (ML) INJECTION ONCE
Refills: 0 | Status: COMPLETED | OUTPATIENT
Start: 2021-11-09 | End: 2021-11-09

## 2021-11-09 RX ORDER — SODIUM CHLORIDE 9 MG/ML
1000 INJECTION, SOLUTION INTRAVENOUS
Refills: 0 | Status: DISCONTINUED | OUTPATIENT
Start: 2021-11-10 | End: 2021-11-11

## 2021-11-09 RX ADMIN — Medication 650 MILLIGRAM(S): at 11:40

## 2021-11-09 RX ADMIN — Medication 50 GRAM(S): at 09:24

## 2021-11-09 RX ADMIN — Medication 0.5 INCH(S): at 11:30

## 2021-11-09 RX ADMIN — PANTOPRAZOLE SODIUM 40 MILLIGRAM(S): 20 TABLET, DELAYED RELEASE ORAL at 05:51

## 2021-11-09 RX ADMIN — Medication 1 GRAM(S): at 01:29

## 2021-11-09 RX ADMIN — Medication 1 GRAM(S): at 11:31

## 2021-11-09 RX ADMIN — Medication 0.5 INCH(S): at 23:44

## 2021-11-09 RX ADMIN — PANTOPRAZOLE SODIUM 40 MILLIGRAM(S): 20 TABLET, DELAYED RELEASE ORAL at 17:23

## 2021-11-09 RX ADMIN — Medication 650 MILLIGRAM(S): at 13:00

## 2021-11-09 RX ADMIN — Medication 0.5 INCH(S): at 05:50

## 2021-11-09 RX ADMIN — Medication 0.5 INCH(S): at 18:57

## 2021-11-09 RX ADMIN — Medication 1 GRAM(S): at 05:51

## 2021-11-09 RX ADMIN — Medication 1 GRAM(S): at 17:23

## 2021-11-09 NOTE — CHART NOTE - NSCHARTNOTEFT_GEN_A_CORE
Contacted today by primary regarding GI input for starting AC.  Mentioned to team yesterday as well as per GI note on 11/4, patient is at risk of rebleeding, while no absolute GI contraindication to heparin bolus/gtt, may consider hep gtt as patient at increased risk for rebleeding and this can be discontinued more quickly. Will defer final decision to primary/IR, weigh risk vs benefit.    Madison Nunez MD  Gastroenterology/Hepatology Fellow, PGY-V    NON-URGENT CONSULTS:  Please email [email or web address]  AT NIGHT AND ON WEEKENDS:  Contact on-call GI fellow via answering service ([phone]) from 5pm-8am and on weekends/holidays  MONDAY-FRIDAY 8AM-5PM:  Pager# [phone] ([facility])  GI Phone# [phone] ([facility]) Contacted today by primary regarding GI input for AC.  Mentioned to team yesterday as well as per GI note on 11/4, patient is at risk of rebleeding, while no absolute GI contraindication to heparin bolus/gtt, may consider hep gtt as patient at increased risk for rebleeding and this can be discontinued more quickly. Will defer final decision to primary/IR, weigh risk vs benefit.    Madison Nunez MD  Gastroenterology/Hepatology Fellow, PGY-V    NON-URGENT CONSULTS:  Please email [email or web address]  AT NIGHT AND ON WEEKENDS:  Contact on-call GI fellow via answering service ([phone]) from 5pm-8am and on weekends/holidays  MONDAY-FRIDAY 8AM-5PM:  Pager# [phone] ([facility])  GI Phone# [phone] ([facility]) Contacted today by primary regarding GI input for AC.  Mentioned to team yesterday as well as per GI note on 11/4, patient is at risk of rebleeding, while no absolute GI contraindication to heparin bolus/gtt, use may be considered as patient at increased risk for rebleeding and this can be discontinued more quickly. Will defer final decision to primary/IR, weigh risk vs benefit.    Madison Nunez MD  Gastroenterology/Hepatology Fellow, PGY-V    NON-URGENT CONSULTS:  Please email [email or web address]  AT NIGHT AND ON WEEKENDS:  Contact on-call GI fellow via answering service ([phone]) from 5pm-8am and on weekends/holidays  MONDAY-FRIDAY 8AM-5PM:  Pager# [phone] ([facility])  GI Phone# [phone] ([facility]) Contacted today by primary regarding GI input for AC.  Mentioned to team yesterday as well as per GI note on 11/5, patient is at risk of rebleeding, while no absolute GI contraindication to heparin bolus/gtt, use may be considered as patient at increased risk for rebleeding and this can be discontinued more quickly. Will defer final decision to primary/IR, weigh risk vs benefit.    Madison Nunez MD  Gastroenterology/Hepatology Fellow, PGY-V    NON-URGENT CONSULTS:  Please email [email or web address]  AT NIGHT AND ON WEEKENDS:  Contact on-call GI fellow via answering service ([phone]) from 5pm-8am and on weekends/holidays  MONDAY-FRIDAY 8AM-5PM:  Pager# [phone] ([facility])  GI Phone# [phone] ([facility])

## 2021-11-09 NOTE — CHART NOTE - NSCHARTNOTEFT_GEN_A_CORE
Preop Dx: R cold foot  Surgeon: Dr. Araujo  Procedure: RLE angiogram, possible angioplasty, possible stent    Vital Signs Last 24 Hrs  T(C): 36.4 (09 Nov 2021 09:45), Max: 37.1 (08 Nov 2021 17:44)  T(F): 97.6 (09 Nov 2021 09:45), Max: 98.8 (08 Nov 2021 21:37)  HR: 95 (09 Nov 2021 09:45) (88 - 107)  BP: 117/65 (09 Nov 2021 09:45) (102/67 - 129/73)  BP(mean): --  RR: 18 (09 Nov 2021 09:45) (18 - 19)  SpO2: 96% (09 Nov 2021 09:45) (95% - 100%)                        9.3    5.97  )-----------( 192      ( 09 Nov 2021 02:55 )             29.6     11-09    132<L>  |  102  |  24<H>  ----------------------------<  100<H>  4.2   |  24  |  0.56    Ca    8.7      09 Nov 2021 02:55  Phos  3.0     11-09  Mg     1.60     11-09      PT/INR - ( 08 Nov 2021 06:59 )   PT: 11.4 sec;   INR: 1.00 ratio         PTT - ( 09 Nov 2021 02:55 )  PTT:59.3 sec  Daily     Daily     EKG: 10/28/21  CXR: 10/30/21  Type and Screen: 11/4/21  COVID: ordered on 11/9      A/P: 81y Female     - OR 11/10/21 for LLE angiogram, possible angioplasty, possible stent with Dr. Araujo  - NPO past midnight, except medications  - IVF while NPO  - Consent signed and in chart  - Medical clearance for OR documented  - 4AM labs to be performed on 11/10 (coags, T&S, CBC, BMP)  - Hep gtt to be stopped en route to angio suite    Vasc surg, [id number]

## 2021-11-09 NOTE — PROGRESS NOTE ADULT - ASSESSMENT
Assessment      Plan:  -      Vascular (C Team) Surgery  [id number] w/ Raynaud's recent LAR with diverting ostomy for rectal cancer c/b post-op R cold leg started on Eliquis now here with acute GI bleed s/p EGD with interventions and L incarcerated inguinal hernia s/p repair 10/29. Vascular surgery following for R cold foot patient now transferred to vascular service for OR tomorrow for Angiogram    Plan:   - Regular diet today, NPO after Midnight for OR tomorrow  - IVF after midnight  - Obtain consent  - Covid negative from yesterday  - CBC is stable  - Continue heparin infusion for RLE acute on chronic limb ischemia  - Seen and discussed with Vascular    Vascular Surgery  [id number]

## 2021-11-09 NOTE — ADVANCED PRACTICE NURSE CONSULT - RECOMMEDATIONS
Resume VNS services when discharge   Please contact Wound Care Service Line if we can be of further assistance (ext [phone]).

## 2021-11-09 NOTE — PROGRESS NOTE ADULT - SUBJECTIVE AND OBJECTIVE BOX
Vascular Surgery Progress Note    Subjective/24hour Events:       Vital Signs:  Vital Signs Last 24 Hrs  T(C): 36.8 (09 Nov 2021 01:38), Max: 37.1 (08 Nov 2021 17:44)  T(F): 98.3 (09 Nov 2021 01:38), Max: 98.8 (08 Nov 2021 21:37)  HR: 99 (09 Nov 2021 01:38) (82 - 107)  BP: 102/67 (09 Nov 2021 01:38) (102/67 - 137/77)  BP(mean): --  RR: 18 (09 Nov 2021 01:38) (18 - 19)  SpO2: 95% (09 Nov 2021 01:38) (95% - 100%)    CAPILLARY BLOOD GLUCOSE          I&O's Detail    07 Nov 2021 07:01  -  08 Nov 2021 07:00  --------------------------------------------------------  IN:    Heparin: 22 mL    IV PiggyBack: 100 mL    Oral Fluid: 360 mL  Total IN: 482 mL    OUT:    Colostomy (mL): 700 mL    Incontinent per Collection Bag (mL): 0 mL    Voided (mL): 600 mL  Total OUT: 1300 mL    Total NET: -818 mL      08 Nov 2021 07:01  -  09 Nov 2021 03:19  --------------------------------------------------------  IN:    Oral Fluid: 720 mL  Total IN: 720 mL    OUT:    Colostomy (mL): 850 mL    Voided (mL): 701 mL  Total OUT: 1551 mL    Total NET: -831 mL          Physical Exam:  Gen:  CV:  Resp:  Abd:  Ext:  Vasc:      Labs:    11-08    136  |  104  |  13  ----------------------------<  93  4.0   |  25  |  0.61    Ca    8.3<L>      08 Nov 2021 06:24  Phos  2.4     11-08  Mg     1.90     11-08                              9.3    5.97  )-----------( 192      ( 09 Nov 2021 02:55 )             29.6     PT/INR - ( 08 Nov 2021 06:59 )   PT: 11.4 sec;   INR: 1.00 ratio         PTT - ( 09 Nov 2021 02:55 )  PTT:59.3 sec   Vascular Surgery Progress Note    Subjective/24hour Events: Patient transferred to vascular overnight. PAtient without complaints this am    Vital Signs:  Vital Signs Last 24 Hrs  T(C): 36.8 (09 Nov 2021 01:38), Max: 37.1 (08 Nov 2021 17:44)  T(F): 98.3 (09 Nov 2021 01:38), Max: 98.8 (08 Nov 2021 21:37)  HR: 99 (09 Nov 2021 01:38) (82 - 107)  BP: 102/67 (09 Nov 2021 01:38) (102/67 - 137/77)  BP(mean): --  RR: 18 (09 Nov 2021 01:38) (18 - 19)  SpO2: 95% (09 Nov 2021 01:38) (95% - 100%)    CAPILLARY BLOOD GLUCOSE          I&O's Detail    07 Nov 2021 07:01  -  08 Nov 2021 07:00  --------------------------------------------------------  IN:    Heparin: 22 mL    IV PiggyBack: 100 mL    Oral Fluid: 360 mL  Total IN: 482 mL    OUT:    Colostomy (mL): 700 mL    Incontinent per Collection Bag (mL): 0 mL    Voided (mL): 600 mL  Total OUT: 1300 mL    Total NET: -818 mL      08 Nov 2021 07:01  -  09 Nov 2021 03:19  --------------------------------------------------------  IN:    Oral Fluid: 720 mL  Total IN: 720 mL    OUT:    Colostomy (mL): 850 mL    Voided (mL): 701 mL  Total OUT: 1551 mL    Total NET: -831 mL          Physical Exam:  Gen: awake and alert, NAD  Ext: right foot with dry wounds no drainage, foot wound + PT and DP signal      Labs:    11-08    136  |  104  |  13  ----------------------------<  93  4.0   |  25  |  0.61    Ca    8.3<L>      08 Nov 2021 06:24  Phos  2.4     11-08  Mg     1.90     11-08                              9.3    5.97  )-----------( 192      ( 09 Nov 2021 02:55 )             29.6     PT/INR - ( 08 Nov 2021 06:59 )   PT: 11.4 sec;   INR: 1.00 ratio         PTT - ( 09 Nov 2021 02:55 )  PTT:59.3 sec

## 2021-11-09 NOTE — ADVANCED PRACTICE NURSE CONSULT - ASSESSMENT
Patient lethargic weak, thin- has had a 20 pound weight loss since the ostomy surgery. Juanita at bedside- able to complete pouch independently with minimal verbal guidance.     Stoma size: 1 1/8" See A&I flowsheet for full stoma assessment details.     
meet up with daughter Juanita at the bedside. Patient's daughter able to demonstrate entire pouch change independently Patient able to observe all steps, however, at this time did not participate much reporting her daughter will teach her at home. Daughter asking appropriate questions and reporting feeling confident about ostomy care.     Supplies provided for discharge.

## 2021-11-09 NOTE — ADVANCED PRACTICE NURSE CONSULT - REASON FOR CONSULT
Follow up visit for post op teaching. 
Patient received sitting in the chair. Follow up visit for ongoing teaching. Daughter Juanita at the bedside. Patient understand surgery type. Patient able to reports pouch changes and frequency but states she " needs reinforcement" with actual pouch change. Up to now daughter and VNS has assisted patient with changes and draining. Pouch intact at this time. Daughter requesting to return tomorrow for pouch change, 
Pateint seen for reinforcement of colostomy teaching. Patient with h/o Raynaud's, lupus, HTN, known L inguinal hernia planned for repair later this year, rectal cancer s/p LAR and diverting transverse colostomy at [facility] 2 weeks ago c/b post op R cold leg on eliquis here with 1 day of hematemesis and melena through ostomy found to have high grade SBO 2/2 L inguinal hernia. Patient daughter, Juanita, at bedside- interviewed- stating her mother has been in rehab from previous hospital stay and had only come home for 3 days before being readmitted. Visiting nurse had come to the house to start teaching. Has home supplies at bedside.

## 2021-11-10 LAB
ANION GAP SERPL CALC-SCNC: 11 MMOL/L — SIGNIFICANT CHANGE UP (ref 7–14)
APTT BLD: 74.2 SEC — HIGH (ref 27–36.3)
BLD GP AB SCN SERPL QL: NEGATIVE — SIGNIFICANT CHANGE UP
BUN SERPL-MCNC: 17 MG/DL — SIGNIFICANT CHANGE UP (ref 7–23)
CALCIUM SERPL-MCNC: 8.4 MG/DL — SIGNIFICANT CHANGE UP (ref 8.4–10.5)
CHLORIDE SERPL-SCNC: 105 MMOL/L — SIGNIFICANT CHANGE UP (ref 98–107)
CO2 SERPL-SCNC: 19 MMOL/L — LOW (ref 22–31)
CREAT SERPL-MCNC: 0.57 MG/DL — SIGNIFICANT CHANGE UP (ref 0.5–1.3)
GLUCOSE SERPL-MCNC: 100 MG/DL — HIGH (ref 70–99)
HCT VFR BLD CALC: 33.6 % — LOW (ref 34.5–45)
HGB BLD-MCNC: 10.6 G/DL — LOW (ref 11.5–15.5)
INR BLD: 1.02 RATIO — SIGNIFICANT CHANGE UP (ref 0.88–1.16)
MAGNESIUM SERPL-MCNC: 1.7 MG/DL — SIGNIFICANT CHANGE UP (ref 1.6–2.6)
MCHC RBC-ENTMCNC: 28.8 PG — SIGNIFICANT CHANGE UP (ref 27–34)
MCHC RBC-ENTMCNC: 31.5 GM/DL — LOW (ref 32–36)
MCV RBC AUTO: 91.3 FL — SIGNIFICANT CHANGE UP (ref 80–100)
NRBC # BLD: 0 /100 WBCS — SIGNIFICANT CHANGE UP
NRBC # FLD: 0 K/UL — SIGNIFICANT CHANGE UP
PHOSPHATE SERPL-MCNC: 2.7 MG/DL — SIGNIFICANT CHANGE UP (ref 2.5–4.5)
PLATELET # BLD AUTO: 119 K/UL — LOW (ref 150–400)
POTASSIUM SERPL-MCNC: 4.4 MMOL/L — SIGNIFICANT CHANGE UP (ref 3.5–5.3)
POTASSIUM SERPL-SCNC: 4.4 MMOL/L — SIGNIFICANT CHANGE UP (ref 3.5–5.3)
PROTHROM AB SERPL-ACNC: 11.6 SEC — SIGNIFICANT CHANGE UP (ref 10.6–13.6)
RBC # BLD: 3.68 M/UL — LOW (ref 3.8–5.2)
RBC # FLD: 18.6 % — HIGH (ref 10.3–14.5)
RH IG SCN BLD-IMP: NEGATIVE — SIGNIFICANT CHANGE UP
SODIUM SERPL-SCNC: 135 MMOL/L — SIGNIFICANT CHANGE UP (ref 135–145)
WBC # BLD: 5.29 K/UL — SIGNIFICANT CHANGE UP (ref 3.8–10.5)
WBC # FLD AUTO: 5.29 K/UL — SIGNIFICANT CHANGE UP (ref 3.8–10.5)

## 2021-11-10 PROCEDURE — 76937 US GUIDE VASCULAR ACCESS: CPT | Mod: 26

## 2021-11-10 PROCEDURE — 99152 MOD SED SAME PHYS/QHP 5/>YRS: CPT

## 2021-11-10 PROCEDURE — 75625 CONTRAST EXAM ABDOMINL AORTA: CPT | Mod: 26

## 2021-11-10 PROCEDURE — 75710 ARTERY X-RAYS ARM/LEG: CPT | Mod: 26,59

## 2021-11-10 PROCEDURE — 37228: CPT

## 2021-11-10 RX ORDER — MAGNESIUM SULFATE 500 MG/ML
2 VIAL (ML) INJECTION ONCE
Refills: 0 | Status: COMPLETED | OUTPATIENT
Start: 2021-11-10 | End: 2021-11-11

## 2021-11-10 RX ORDER — POTASSIUM PHOSPHATE, MONOBASIC POTASSIUM PHOSPHATE, DIBASIC 236; 224 MG/ML; MG/ML
15 INJECTION, SOLUTION INTRAVENOUS ONCE
Refills: 0 | Status: DISCONTINUED | OUTPATIENT
Start: 2021-11-10 | End: 2021-11-11

## 2021-11-10 RX ORDER — HEPARIN SODIUM 5000 [USP'U]/ML
1000 INJECTION INTRAVENOUS; SUBCUTANEOUS
Qty: 25000 | Refills: 0 | Status: DISCONTINUED | OUTPATIENT
Start: 2021-11-10 | End: 2021-11-11

## 2021-11-10 RX ORDER — SODIUM CHLORIDE 9 MG/ML
500 INJECTION INTRAMUSCULAR; INTRAVENOUS; SUBCUTANEOUS
Refills: 0 | Status: COMPLETED | OUTPATIENT
Start: 2021-11-10 | End: 2021-11-11

## 2021-11-10 RX ADMIN — HEPARIN SODIUM 10 UNIT(S)/HR: 5000 INJECTION INTRAVENOUS; SUBCUTANEOUS at 21:30

## 2021-11-10 RX ADMIN — Medication 1 GRAM(S): at 05:10

## 2021-11-10 RX ADMIN — Medication 1 GRAM(S): at 12:28

## 2021-11-10 RX ADMIN — Medication 0.5 INCH(S): at 12:40

## 2021-11-10 RX ADMIN — Medication 0.5 INCH(S): at 05:10

## 2021-11-10 RX ADMIN — Medication 1 GRAM(S): at 00:43

## 2021-11-10 RX ADMIN — SODIUM CHLORIDE 75 MILLILITER(S): 9 INJECTION, SOLUTION INTRAVENOUS at 05:11

## 2021-11-10 RX ADMIN — PANTOPRAZOLE SODIUM 40 MILLIGRAM(S): 20 TABLET, DELAYED RELEASE ORAL at 05:11

## 2021-11-10 NOTE — CHART NOTE - NSCHARTNOTEFT_GEN_A_CORE
Vascular Surgeyr  Post Operative Check Note  Patient: DAVID QUEEN 81y (1940) Female   MRN: [id number]  Location: [address]  Visit: 10-28-21 Inpatient  Date: 11-10-21 @ 21:17    Procedure: S/P ***    Subjective:       Objective:  Vitals: T(F): 98.4 (11-10-21 @ 20:30), Max: 98.4 (11-10-21 @ 20:30)  HR: 85 (11-10-21 @ 20:30)  BP: 145/70 (11-10-21 @ 20:30) (111/79 - 145/70)  RR: 16 (11-10-21 @ 20:30)  SpO2: 96% (11-10-21 @ 20:30)  Vent Settings:     In:   11-09-21 @ 07:01  -  11-10-21 @ 07:00  --------------------------------------------------------  IN: 0 mL      IV Fluids: dextrose 5% + sodium chloride 0.9%. 1000 milliLiter(s) (75 mL/Hr) IV Continuous <Continuous>  magnesium sulfate  IVPB 2 Gram(s) IV Intermittent once  potassium phosphate IVPB 15 milliMole(s) IV Intermittent once  sodium chloride 0.9%. 500 milliLiter(s) (75 mL/Hr) IV Continuous <Continuous>      Out:   11-09-21 @ 07:01  -  11-10-21 @ 07:00  --------------------------------------------------------  OUT: 850 mL      EBL:     Voided Urine:   11-09-21 @ 07:01  -  11-10-21 @ 07:00  --------------------------------------------------------  OUT: 850 mL      Young Catheter: yes no   Drains:   RUTHANN:    ,   Chest Tube:      NG Tube:         Physical Examination:  General: NAD, resting comfortably in bed  HEENT: Normocephalic atraumatic  Respiratory: Nonlabored respirations, normal CW expansion.  Cardio: S1S2, regular rate and rhythm.  Abdomen: softly distended, appropriately tender, surgical incisions are c/d/i. NGT/OGT*  Vascular: extremities are warm and well perfused.     Imaging:  No post-op imaging studies    Assessment:  81yFemale patient S/P *** for ***    Plan:  - IV Abx:   - Pain control PRN  - Diet:  - Activity:  - DVT ppx:    Date/Time: 11-10-21 @ 21:17 Vascular Surgery  Post Operative Check Note  Patient: DAVID QUEEN 81y (1940) Female   MRN: [id number]  Location: [address]  Visit: 10-28-21 Inpatient  Date: 11-10-21 @ 21:17    Procedure: S/P RLE angiogram    PT plasty was attempted; however, was    Subjective:       Objective:  Vitals: T(F): 98.4 (11-10-21 @ 20:30), Max: 98.4 (11-10-21 @ 20:30)  HR: 85 (11-10-21 @ 20:30)  BP: 145/70 (11-10-21 @ 20:30) (111/79 - 145/70)  RR: 16 (11-10-21 @ 20:30)  SpO2: 96% (11-10-21 @ 20:30)  Vent Settings:     In:   11-09-21 @ 07:01  -  11-10-21 @ 07:00  --------------------------------------------------------  IN: 0 mL      IV Fluids: dextrose 5% + sodium chloride 0.9%. 1000 milliLiter(s) (75 mL/Hr) IV Continuous <Continuous>  magnesium sulfate  IVPB 2 Gram(s) IV Intermittent once  potassium phosphate IVPB 15 milliMole(s) IV Intermittent once  sodium chloride 0.9%. 500 milliLiter(s) (75 mL/Hr) IV Continuous <Continuous>      Out:   11-09-21 @ 07:01  -  11-10-21 @ 07:00  --------------------------------------------------------  OUT: 850 mL      EBL:     Voided Urine:   11-09-21 @ 07:01  -  11-10-21 @ 07:00  --------------------------------------------------------  OUT: 850 mL      Young Catheter: yes no   Drains:   RUTHANN:    ,   Chest Tube:      NG Tube:         Physical Examination:  General: NAD, resting comfortably in bed  HEENT: Normocephalic atraumatic  Respiratory: Nonlabored respirations, normal CW expansion.  Cardio: S1S2, regular rate and rhythm.  Abdomen: softly distended, appropriately tender, surgical incisions are c/d/i. NGT/OGT*  Vascular: extremities are warm and well perfused.     Imaging:  No post-op imaging studies    Assessment:  81yFemale patient S/P *** for ***    Plan:  - IV Abx:   - Pain control PRN  - Diet:  - Activity:  - DVT ppx:    Date/Time: 11-10-21 @ 21:17 Vascular Surgery  Post Operative Check Note  Patient: DAVID QUEEN 81y (1940) Female   MRN: [id number]  Location: [address]  Visit: 10-28-21 Inpatient  Date: 11-10-21 @ 21:17    Procedure: S/P RLE angiogram    PT plasty was attempted; however, was not successful.    Subjective: Patient seen and examined at bedside. No acute events since procedure. Pain controlled. No nausea/vomiting. Recovering appropriately with no complaints.      Objective:  Vitals: T(F): 98.4 (11-10-21 @ 20:30), Max: 98.4 (11-10-21 @ 20:30)  HR: 85 (11-10-21 @ 20:30)  BP: 145/70 (11-10-21 @ 20:30) (111/79 - 145/70)  RR: 16 (11-10-21 @ 20:30)  SpO2: 96% (11-10-21 @ 20:30)  Vent Settings:     In:   11-09-21 @ 07:01  -  11-10-21 @ 07:00  --------------------------------------------------------  IN: 0 mL      IV Fluids: dextrose 5% + sodium chloride 0.9%. 1000 milliLiter(s) (75 mL/Hr) IV Continuous <Continuous>  magnesium sulfate  IVPB 2 Gram(s) IV Intermittent once  potassium phosphate IVPB 15 milliMole(s) IV Intermittent once  sodium chloride 0.9%. 500 milliLiter(s) (75 mL/Hr) IV Continuous <Continuous>      Out:   11-09-21 @ 07:01  -  11-10-21 @ 07:00  --------------------------------------------------------  OUT: 850 mL      EBL:     Voided Urine:   11-09-21 @ 07:01  -  11-10-21 @ 07:00  --------------------------------------------------------  OUT: 850 mL      Young Catheter: no  No drains.      Physical Examination:  General: NAD, resting comfortably in bed  CV: Regular rate  Resp: Nonlabored breathing on room air  Ext:  - L groin access site is soft, no palpable collections, overlying dressing is c/d/i with no strikethrough  - RLE is warm  Vascular: R PT and DP doppler signals present      Imaging:  No post-op imaging studies    Assessment:  81yFemale patient S/P RLE angiogram for concerns for RLE limb ischemia on initial presentation.    Plan:  - Will resume hep gtt, goal aPTT 58-99  - IVF @ 75cc/hr  - Pain control  - Diet: Regular rate  - Activity: flat for four hours, bedrest until tomorrow morning  - DVT ppx: On hep gtt      Date/Time: 11-10-21 @ 21:17  Vascular (C Team) Surgery  [id number]

## 2021-11-10 NOTE — PROGRESS NOTE ADULT - ASSESSMENT
Assessment      Plan:  -      Vascular (C Team) Surgery  [id number] 81F w/ Raynaud's recent LAR with diverting ostomy for rectal cancer c/b post-op R cold leg started on Eliquis now here with acute GI bleed s/p EGD with interventions and L incarcerated inguinal hernia s/p repair 10/29. Vascular surgery following for R cold foot patient now transferred to vascular service for OR tomorrow for Angiogram    Plan:   - OR Today  - F/u GI Recs  - Pain control  - Diet: NPO  - Continue heparin infusion for RLE acute on chronic limb ischemia  - Dispo; Planning    C-Team Surgery  [id number]

## 2021-11-10 NOTE — PROGRESS NOTE ADULT - SUBJECTIVE AND OBJECTIVE BOX
Vascular Surgery Progress Note    Subjective/24hour Events:       Vital Signs:  Vital Signs Last 24 Hrs  T(C): 36.7 (09 Nov 2021 21:40), Max: 36.9 (09 Nov 2021 05:44)  T(F): 98 (09 Nov 2021 21:40), Max: 98.5 (09 Nov 2021 05:44)  HR: 94 (09 Nov 2021 21:40) (88 - 95)  BP: 122/53 (09 Nov 2021 21:40) (110/59 - 132/54)  BP(mean): --  RR: 17 (09 Nov 2021 21:40) (17 - 19)  SpO2: 97% (09 Nov 2021 21:40) (94% - 98%)    CAPILLARY BLOOD GLUCOSE          I&O's Detail    08 Nov 2021 07:01  -  09 Nov 2021 07:00  --------------------------------------------------------  IN:    Oral Fluid: 720 mL  Total IN: 720 mL    OUT:    Colostomy (mL): 850 mL    Voided (mL): 701 mL  Total OUT: 1551 mL    Total NET: -831 mL      09 Nov 2021 07:01  -  10 Nov 2021 03:00  --------------------------------------------------------  IN:  Total IN: 0 mL    OUT:    Colostomy (mL): 550 mL    Voided (mL): 300 mL  Total OUT: 850 mL    Total NET: -850 mL          Physical Exam:  Gen:  CV:  Resp:  Ext:  Vasc:      Labs:    11-09    132<L>  |  102  |  24<H>  ----------------------------<  100<H>  4.2   |  24  |  0.56    Ca    8.7      09 Nov 2021 02:55  Phos  3.0     11-09  Mg     1.60     11-09                              9.3    5.97  )-----------( 192      ( 09 Nov 2021 02:55 )             29.6     PT/INR - ( 08 Nov 2021 06:59 )   PT: 11.4 sec;   INR: 1.00 ratio         PTT - ( 09 Nov 2021 02:55 )  PTT:59.3 sec   Morning Surgical Progress Note    SUBJECTIVE: Patient seen and examined at bedside with surgical team, patient without complaints.       Vital Signs Last 24 Hrs  T(C): 36.6 (10 Nov 2021 11:31), Max: 36.8 (10 Nov 2021 05:05)  T(F): 97.9 (10 Nov 2021 11:31), Max: 98.2 (10 Nov 2021 05:05)  HR: 93 (10 Nov 2021 11:31) (88 - 94)  BP: 111/79 (10 Nov 2021 11:31) (110/59 - 138/62)  BP(mean): --  RR: 16 (10 Nov 2021 11:31) (16 - 17)  SpO2: 100% (10 Nov 2021 11:31) (94% - 100%)I&O's Detail    09 Nov 2021 07:01  -  10 Nov 2021 07:00  --------------------------------------------------------  IN:  Total IN: 0 mL    OUT:    Colostomy (mL): 550 mL    Voided (mL): 300 mL  Total OUT: 850 mL    Total NET: -850 mL        Medications  MEDICATIONS  (STANDING):  dextrose 5% + sodium chloride 0.9%. 1000 milliLiter(s) (75 mL/Hr) IV Continuous <Continuous>  heparin  Infusion 1000 Unit(s)/Hr (10 mL/Hr) IV Continuous <Continuous>  nitroglycerin    2% Ointment 0.5 Inch(s) Transdermal <User Schedule>  pantoprazole    Tablet 40 milliGRAM(s) Oral two times a day  sucralfate 1 Gram(s) Oral four times a day    MEDICATIONS  (PRN):  acetaminophen     Tablet .. 650 milliGRAM(s) Oral every 6 hours PRN Temp greater or equal to 38C (100.4F), Mild Pain (1 - 3)      Physical Exam  Gen: awake and alert, NAD  Ext: right foot with dry wounds no drainage, foot wound + PT and DP signal    LABS:                        10.6   5.29  )-----------( 119      ( 10 Nov 2021 04:21 )             33.6     11-10    135  |  105  |  17  ----------------------------<  100<H>  4.4   |  19<L>  |  0.57    Ca    8.4      10 Nov 2021 04:21  Phos  2.7     11-10  Mg     1.70     11-10      PT/INR - ( 10 Nov 2021 04:21 )   PT: 11.6 sec;   INR: 1.02 ratio         PTT - ( 10 Nov 2021 04:21 )  PTT:74.2 sec      ABO Interpretation: O (11-10-21 @ 04:29)       Morning Surgical Progress Note    SUBJECTIVE: Patient seen and examined at bedside with surgical team, patient without complaints. Pt did not endorse any chest pain, SOB, fevers/chills, N/V/D/C.      Vital Signs Last 24 Hrs  T(C): 36.6 (10 Nov 2021 11:31), Max: 36.8 (10 Nov 2021 05:05)  T(F): 97.9 (10 Nov 2021 11:31), Max: 98.2 (10 Nov 2021 05:05)  HR: 93 (10 Nov 2021 11:31) (88 - 94)  BP: 111/79 (10 Nov 2021 11:31) (110/59 - 138/62)  BP(mean): --  RR: 16 (10 Nov 2021 11:31) (16 - 17)  SpO2: 100% (10 Nov 2021 11:31) (94% - 100%)I&O's Detail    09 Nov 2021 07:01  -  10 Nov 2021 07:00  --------------------------------------------------------  IN:  Total IN: 0 mL    OUT:    Colostomy (mL): 550 mL    Voided (mL): 300 mL  Total OUT: 850 mL    Total NET: -850 mL        Medications  MEDICATIONS  (STANDING):  dextrose 5% + sodium chloride 0.9%. 1000 milliLiter(s) (75 mL/Hr) IV Continuous <Continuous>  heparin  Infusion 1000 Unit(s)/Hr (10 mL/Hr) IV Continuous <Continuous>  nitroglycerin    2% Ointment 0.5 Inch(s) Transdermal <User Schedule>  pantoprazole    Tablet 40 milliGRAM(s) Oral two times a day  sucralfate 1 Gram(s) Oral four times a day    MEDICATIONS  (PRN):  acetaminophen     Tablet .. 650 milliGRAM(s) Oral every 6 hours PRN Temp greater or equal to 38C (100.4F), Mild Pain (1 - 3)      Physical Exam  Gen: awake and alert, NAD  Ext: right foot with dry wounds no drainage, foot wound + PT and DP signal    LABS:                        10.6   5.29  )-----------( 119      ( 10 Nov 2021 04:21 )             33.6     11-10    135  |  105  |  17  ----------------------------<  100<H>  4.4   |  19<L>  |  0.57    Ca    8.4      10 Nov 2021 04:21  Phos  2.7     11-10  Mg     1.70     11-10      PT/INR - ( 10 Nov 2021 04:21 )   PT: 11.6 sec;   INR: 1.02 ratio         PTT - ( 10 Nov 2021 04:21 )  PTT:74.2 sec      ABO Interpretation: O (11-10-21 @ 04:29)

## 2021-11-11 ENCOUNTER — TRANSCRIPTION ENCOUNTER (OUTPATIENT)
Age: 81
End: 2021-11-11

## 2021-11-11 VITALS
SYSTOLIC BLOOD PRESSURE: 121 MMHG | DIASTOLIC BLOOD PRESSURE: 69 MMHG | HEART RATE: 89 BPM | RESPIRATION RATE: 17 BRPM | TEMPERATURE: 98 F | OXYGEN SATURATION: 95 %

## 2021-11-11 LAB
ANION GAP SERPL CALC-SCNC: 8 MMOL/L — SIGNIFICANT CHANGE UP (ref 7–14)
APTT BLD: 36.7 SEC — HIGH (ref 27–36.3)
BUN SERPL-MCNC: 8 MG/DL — SIGNIFICANT CHANGE UP (ref 7–23)
CALCIUM SERPL-MCNC: 9.2 MG/DL — SIGNIFICANT CHANGE UP (ref 8.4–10.5)
CHLORIDE SERPL-SCNC: 104 MMOL/L — SIGNIFICANT CHANGE UP (ref 98–107)
CO2 SERPL-SCNC: 22 MMOL/L — SIGNIFICANT CHANGE UP (ref 22–31)
CREAT SERPL-MCNC: 0.54 MG/DL — SIGNIFICANT CHANGE UP (ref 0.5–1.3)
GLUCOSE SERPL-MCNC: 113 MG/DL — HIGH (ref 70–99)
HCT VFR BLD CALC: 39.4 % — SIGNIFICANT CHANGE UP (ref 34.5–45)
HGB BLD-MCNC: 12.6 G/DL — SIGNIFICANT CHANGE UP (ref 11.5–15.5)
INR BLD: 0.98 RATIO — SIGNIFICANT CHANGE UP (ref 0.88–1.16)
MAGNESIUM SERPL-MCNC: 2.4 MG/DL — SIGNIFICANT CHANGE UP (ref 1.6–2.6)
MCHC RBC-ENTMCNC: 30.3 PG — SIGNIFICANT CHANGE UP (ref 27–34)
MCHC RBC-ENTMCNC: 32 GM/DL — SIGNIFICANT CHANGE UP (ref 32–36)
MCV RBC AUTO: 94.7 FL — SIGNIFICANT CHANGE UP (ref 80–100)
NRBC # BLD: 0 /100 WBCS — SIGNIFICANT CHANGE UP
NRBC # FLD: 0 K/UL — SIGNIFICANT CHANGE UP
PHOSPHATE SERPL-MCNC: 3.1 MG/DL — SIGNIFICANT CHANGE UP (ref 2.5–4.5)
PLATELET # BLD AUTO: 248 K/UL — SIGNIFICANT CHANGE UP (ref 150–400)
POTASSIUM SERPL-MCNC: 3.7 MMOL/L — SIGNIFICANT CHANGE UP (ref 3.5–5.3)
POTASSIUM SERPL-SCNC: 3.7 MMOL/L — SIGNIFICANT CHANGE UP (ref 3.5–5.3)
PROTHROM AB SERPL-ACNC: 11.3 SEC — SIGNIFICANT CHANGE UP (ref 10.6–13.6)
RBC # BLD: 4.16 M/UL — SIGNIFICANT CHANGE UP (ref 3.8–5.2)
RBC # FLD: 18.8 % — HIGH (ref 10.3–14.5)
SODIUM SERPL-SCNC: 134 MMOL/L — LOW (ref 135–145)
WBC # BLD: 3.45 K/UL — LOW (ref 3.8–10.5)
WBC # FLD AUTO: 3.45 K/UL — LOW (ref 3.8–10.5)

## 2021-11-11 RX ORDER — APIXABAN 2.5 MG/1
2.5 TABLET, FILM COATED ORAL EVERY 12 HOURS
Refills: 0 | Status: DISCONTINUED | OUTPATIENT
Start: 2021-11-11 | End: 2021-11-11

## 2021-11-11 RX ORDER — PANTOPRAZOLE SODIUM 20 MG/1
1 TABLET, DELAYED RELEASE ORAL
Qty: 0 | Refills: 0 | DISCHARGE
Start: 2021-11-11

## 2021-11-11 RX ORDER — SUCRALFATE 1 G
1 TABLET ORAL
Qty: 0 | Refills: 0 | DISCHARGE
Start: 2021-11-11

## 2021-11-11 RX ORDER — PANTOPRAZOLE SODIUM 20 MG/1
1 TABLET, DELAYED RELEASE ORAL
Qty: 84 | Refills: 0
Start: 2021-11-11 | End: 2021-12-22

## 2021-11-11 RX ORDER — LOPERAMIDE HCL 2 MG
0 TABLET ORAL
Qty: 0 | Refills: 0 | DISCHARGE

## 2021-11-11 RX ORDER — CLARITHROMYCIN 500 MG
1 TABLET ORAL
Qty: 28 | Refills: 0
Start: 2021-11-11 | End: 2021-11-24

## 2021-11-11 RX ORDER — CIPROFLOXACIN LACTATE 400MG/40ML
1 VIAL (ML) INTRAVENOUS
Qty: 0 | Refills: 0 | DISCHARGE

## 2021-11-11 RX ORDER — ACETAMINOPHEN 500 MG
2 TABLET ORAL
Qty: 0 | Refills: 0 | DISCHARGE
Start: 2021-11-11

## 2021-11-11 RX ORDER — SUCRALFATE 1 G
1 TABLET ORAL
Qty: 84 | Refills: 0
Start: 2021-11-11 | End: 2021-12-01

## 2021-11-11 RX ORDER — METRONIDAZOLE 500 MG
1 TABLET ORAL
Qty: 42 | Refills: 0
Start: 2021-11-11 | End: 2021-11-24

## 2021-11-11 RX ADMIN — Medication 1 GRAM(S): at 00:42

## 2021-11-11 RX ADMIN — Medication 1 GRAM(S): at 12:14

## 2021-11-11 RX ADMIN — SODIUM CHLORIDE 75 MILLILITER(S): 9 INJECTION, SOLUTION INTRAVENOUS at 00:37

## 2021-11-11 RX ADMIN — Medication 0.5 INCH(S): at 05:53

## 2021-11-11 RX ADMIN — HEPARIN SODIUM 12 UNIT(S)/HR: 5000 INJECTION INTRAVENOUS; SUBCUTANEOUS at 01:39

## 2021-11-11 RX ADMIN — Medication 1 GRAM(S): at 05:52

## 2021-11-11 RX ADMIN — Medication 0.5 INCH(S): at 11:32

## 2021-11-11 RX ADMIN — PANTOPRAZOLE SODIUM 40 MILLIGRAM(S): 20 TABLET, DELAYED RELEASE ORAL at 05:54

## 2021-11-11 RX ADMIN — SODIUM CHLORIDE 75 MILLILITER(S): 9 INJECTION INTRAMUSCULAR; INTRAVENOUS; SUBCUTANEOUS at 00:38

## 2021-11-11 RX ADMIN — APIXABAN 2.5 MILLIGRAM(S): 2.5 TABLET, FILM COATED ORAL at 09:45

## 2021-11-11 RX ADMIN — Medication 50 GRAM(S): at 04:38

## 2021-11-11 NOTE — PROGRESS NOTE ADULT - ASSESSMENT
Assessment      Plan:  -      Vascular (C Team) Surgery  [id number] 81F w/ Rhiannaud's recent LAR with diverting ostomy for rectal cancer c/b post-op R cold leg started on Eliquis now here with acute GI bleed s/p EGD with interventions and L incarcerated inguinal hernia s/p repair 10/29. Vascular surgery following for R cold foot patient now transferred to vascular service, now s/p RLE angiogram on 11/10.    Plan:   - F/u GI Recs  - Pain control  - Diet: reg  - Hep gtt discontinued on 11/10; restarted on Eliquis 2.5mg BID for acute limb ischemia  - Dispo; Planning    C-Team Surgery  [id number]

## 2021-11-11 NOTE — DISCHARGE NOTE NURSING/CASE MANAGEMENT/SOCIAL WORK - NSDCFUADDAPPT_GEN_ALL_CORE_FT
Please follow up in [facility] [phone].  Call today for appointment as soon as possible.    Please follow up with Dr. Araujo (Vascular Surgeon).  Call today for appointment in 1 week.    Please follow up with your Rheumatologist.  Call today for appointment in 1 week for your Reynauds disease.    Please Follow up with Podiatry.  Call today for appointment in 1 week.      Please Follow up with Dr. Shade Jones (General Surgeon for Hernia/Bowel Obstruction).  Call today for appointment in 1 week.      You are being treated for H Pylori infection that was diagnosed during your EGD by GI. Please finish all antibiotics as prescribed. Please follow up with Gastroenterology - Dr. Tom Ramos to make sure the infection has been cleared.  Call today for appointment in 1 week.  [phone]

## 2021-11-11 NOTE — PROGRESS NOTE ADULT - SUBJECTIVE AND OBJECTIVE BOX
Vascular Surgery Progress Note    Subjective/24hour Events:       Vital Signs:  Vital Signs Last 24 Hrs  T(C): 36.6 (11 Nov 2021 02:25), Max: 36.9 (10 Nov 2021 20:30)  T(F): 97.8 (11 Nov 2021 02:25), Max: 98.4 (10 Nov 2021 20:30)  HR: 83 (11 Nov 2021 02:25) (83 - 93)  BP: 147/70 (11 Nov 2021 02:25) (111/79 - 147/70)  BP(mean): --  RR: 16 (11 Nov 2021 02:25) (16 - 17)  SpO2: 97% (11 Nov 2021 02:25) (96% - 100%)    CAPILLARY BLOOD GLUCOSE          I&O's Detail    09 Nov 2021 07:01  -  10 Nov 2021 07:00  --------------------------------------------------------  IN:  Total IN: 0 mL    OUT:    Colostomy (mL): 550 mL    Voided (mL): 300 mL  Total OUT: 850 mL    Total NET: -850 mL      10 Nov 2021 07:01  -  11 Nov 2021 03:09  --------------------------------------------------------  IN:  Total IN: 0 mL    OUT:    Incontinent per Collection Bag (mL): 350 mL    Voided (mL): 0 mL  Total OUT: 350 mL    Total NET: -350 mL          Physical Exam:  Gen:  CV:  Resp:  Abd:  Ext:  Vasc:      Labs:    11-10    135  |  105  |  17  ----------------------------<  100<H>  4.4   |  19<L>  |  0.57    Ca    8.4      10 Nov 2021 04:21  Phos  2.7     11-10  Mg     1.70     11-10                              10.6   5.29  )-----------( 119      ( 10 Nov 2021 04:21 )             33.6     PT/INR - ( 10 Nov 2021 04:21 )   PT: 11.6 sec;   INR: 1.02 ratio         PTT - ( 11 Nov 2021 00:01 )  PTT:36.7 sec   Vascular Surgery Progress Note    Subjective/24hour Events: Patient seen and examined at bedside this morning. No acute events overnight. Denies nausea, vomiting, chest pain, shortness of breath.       Vital Signs:  Vital Signs Last 24 Hrs  T(C): 36.6 (11 Nov 2021 02:25), Max: 36.9 (10 Nov 2021 20:30)  T(F): 97.8 (11 Nov 2021 02:25), Max: 98.4 (10 Nov 2021 20:30)  HR: 83 (11 Nov 2021 02:25) (83 - 93)  BP: 147/70 (11 Nov 2021 02:25) (111/79 - 147/70)  BP(mean): --  RR: 16 (11 Nov 2021 02:25) (16 - 17)  SpO2: 97% (11 Nov 2021 02:25) (96% - 100%)    CAPILLARY BLOOD GLUCOSE          I&O's Detail    09 Nov 2021 07:01  -  10 Nov 2021 07:00  --------------------------------------------------------  IN:  Total IN: 0 mL    OUT:    Colostomy (mL): 550 mL    Voided (mL): 300 mL  Total OUT: 850 mL    Total NET: -850 mL      10 Nov 2021 07:01  -  11 Nov 2021 03:09  --------------------------------------------------------  IN:  Total IN: 0 mL    OUT:    Incontinent per Collection Bag (mL): 350 mL    Voided (mL): 0 mL  Total OUT: 350 mL    Total NET: -350 mL    Physical Exam  Gen: awake and alert, NAD  Ext: right foot with dry wounds no drainage, foot wound DP signal    Labs:    11-10    135  |  105  |  17  ----------------------------<  100<H>  4.4   |  19<L>  |  0.57    Ca    8.4      10 Nov 2021 04:21  Phos  2.7     11-10  Mg     1.70     11-10                              10.6   5.29  )-----------( 119      ( 10 Nov 2021 04:21 )             33.6     PT/INR - ( 10 Nov 2021 04:21 )   PT: 11.6 sec;   INR: 1.02 ratio         PTT - ( 11 Nov 2021 00:01 )  PTT:36.7 sec

## 2021-11-11 NOTE — PROGRESS NOTE ADULT - REASON FOR ADMISSION
GI bleed, SBO 2/2 L inguinal hernia

## 2021-11-11 NOTE — DISCHARGE NOTE NURSING/CASE MANAGEMENT/SOCIAL WORK - PATIENT PORTAL LINK FT
You can access the FollowMyHealth Patient Portal offered by [facility] by registering at the following website: [email or web address]. By joining [facility]’s FollowMyHealth portal, you will also be able to view your health information using other applications (apps) compatible with our system.

## 2021-11-11 NOTE — PROGRESS NOTE ADULT - PROVIDER SPECIALTY LIST ADULT
Gastroenterology
Surgery
Vascular Surgery
Anesthesia
Gastroenterology
Surgery
Vascular Surgery
Vascular Surgery
Anesthesia
Gastroenterology
Intervent Radiology
SICU
Surgery
Vascular Surgery
Gastroenterology
Gastroenterology
Surgery
Vascular Surgery
Vascular Surgery

## 2021-11-13 PROBLEM — C20 MALIGNANT NEOPLASM OF RECTUM: Chronic | Status: ACTIVE | Noted: 2021-10-28

## 2021-11-13 PROBLEM — Z87.19 PERSONAL HISTORY OF OTHER DISEASES OF THE DIGESTIVE SYSTEM: Chronic | Status: ACTIVE | Noted: 2021-10-28

## 2021-11-16 ENCOUNTER — APPOINTMENT (OUTPATIENT)
Dept: SURGERY | Facility: HOSPITAL | Age: 81
End: 2021-11-16
Payer: MEDICARE

## 2021-11-16 VITALS
BODY MASS INDEX: 19.24 KG/M2 | SYSTOLIC BLOOD PRESSURE: 148 MMHG | TEMPERATURE: 98.2 F | HEIGHT: 60 IN | WEIGHT: 98 LBS | HEART RATE: 102 BPM | DIASTOLIC BLOOD PRESSURE: 80 MMHG

## 2021-11-16 PROCEDURE — 99024 POSTOP FOLLOW-UP VISIT: CPT

## 2021-11-16 NOTE — PLAN
[FreeTextEntry1] : Patient with multiple comorbidities\par f/u with vascular, colorectal surgeon, PMD\par f/u prn

## 2021-11-16 NOTE — HISTORY OF PRESENT ILLNESS
[de-identified] : 81ym with incarcerated left inguinal hernia s/p open inguinal hernia repair with mesh and EGD for upper gi bleed. Patient with history of diverting loop ileostomy and lar for cancer. Patient tolerating diet, ostomy functioning. On exam incision is well healed. Pathology consistent with hernia sac

## 2021-11-22 ENCOUNTER — APPOINTMENT (OUTPATIENT)
Dept: WOUND CARE | Facility: CLINIC | Age: 81
End: 2021-11-22
Payer: MEDICARE

## 2021-11-22 ENCOUNTER — APPOINTMENT (OUTPATIENT)
Dept: VASCULAR SURGERY | Facility: CLINIC | Age: 81
End: 2021-11-22
Payer: MEDICARE

## 2021-11-22 VITALS
HEIGHT: 60 IN | TEMPERATURE: 97.2 F | SYSTOLIC BLOOD PRESSURE: 130 MMHG | BODY MASS INDEX: 19.24 KG/M2 | HEART RATE: 90 BPM | DIASTOLIC BLOOD PRESSURE: 82 MMHG | WEIGHT: 98 LBS

## 2021-11-22 VITALS
RESPIRATION RATE: 16 BRPM | DIASTOLIC BLOOD PRESSURE: 64 MMHG | TEMPERATURE: 96.6 F | SYSTOLIC BLOOD PRESSURE: 133 MMHG | HEART RATE: 88 BPM

## 2021-11-22 DIAGNOSIS — I70.269 ATHEROSCLEROSIS OF NATIVE ARTERIES OF EXTREMITIES WITH GANGRENE, UNSPECIFIED EXTREMITY: ICD-10-CM

## 2021-11-22 PROCEDURE — 99214 OFFICE O/P EST MOD 30 MIN: CPT

## 2021-11-22 PROCEDURE — 93922 UPR/L XTREMITY ART 2 LEVELS: CPT

## 2021-11-22 PROCEDURE — 99203 OFFICE O/P NEW LOW 30 MIN: CPT

## 2021-11-22 NOTE — PHYSICAL EXAM
[0] : right 0 [Ankle Swelling (On Exam)] : present [Ankle Swelling On The Right] : mild [de-identified] : has recently lost approx 10 lbs, but does not appear cachectic [de-identified] : not labored [FreeTextEntry1] : right foot is warm , but there is dry gangrene of te distal tip of great toe, no cellulitis [de-identified] : limited ambualtion [de-identified] : atrophic [de-identified] : awake , speaks halting English [Please See PDF for [employer]] : Please See PDF for [employer].

## 2021-11-22 NOTE — REASON FOR VISIT
[Consultation] : a consultation visit [Family Member] : family member [FreeTextEntry1] : dry gangrene of toe

## 2021-11-22 NOTE — ASSESSMENT
[FreeTextEntry1] : s/p endovascular procedure of right leg, with resultant warm foot and gangrene of distal right great tie\par \par Clinical right foot perfusion seems adeqiate\par \par Is a candidate for HBO\par Goal would be to limit the extent of dry gangrene, and limit extent of resection necessary, to only the great toe\par \par Discussed with daughter and patient\par  Await authorization

## 2021-11-22 NOTE — HISTORY OF PRESENT ILLNESS
[FreeTextEntry1] : Daughter present and provides bulk of history\par Patient is of [place] origin, who was recently d/c from [facility] after undergoing treatment for rectal cancer. She is reported to have a temporary ileostomy\par She has been evaluated by vascular surgery where an endovascular procedure was performed, but the patient has developed a gangrenous toe. \par  She reportedly has pain associated with this\par She has been referred by vascular for possible HBO\par She is not a diabetic and has no smoking history\par daughter also reports a recent Dx of "Ulcers",and UGI bleed

## 2021-11-23 NOTE — HISTORY OF PRESENT ILLNESS
[FreeTextEntry1] : I have just had the pleasure of seeing Mrs. Katie Harrell in follow up for lower extremity arterial insufficiency. Mrs. Harrell is a delightful 81-year-old lady who initially presented with a longstanding history of lower extremity venous insufficiency. On 4/3/2018, she underwent right GSV RFA and stab phlebectomy. Follow up ultrasonography demonstrated a closed GSV, without evidence of DVT. Following this, Mrs. Harrell fractured her pelvis (late 2018). This was managed non-operatively. She worked with physical therapy and has largely recovered from her injury. She was ambulating with a cane. During her last follow up, she complained of constant pain in her legs and feet (at rest and when ambulating). KEREN were 0.96/0.86 (right) and 0.74/0.73 (left). We discussed proceeding with angiography, however, she deferred the intervention. \par Following her last visit and approximately one month ago, Mrs. Harrell underwent colon resection and ostomy creation (LAR) for colorectal malignancy. During her hospitalization, she developed pain and coolness of the right lower extremity. She was placed on therapeutic Eliquis. She then presented to [facility] with hemoptysis and was found to have Hgb 3, as well as ischemic changes in the right foot. She was transferred to [facility], where she underwent EGD with GI and inguinal hernia repair with GS. Once she was deemed at acceptable risk for anticoagulation, right leg angiography was performed. 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 PTA of the PT was performed. She has since been discharged home. She is currently taking Aspirin 81mg daily and Eliquis 2.5mg BID. She has ischemic changes of the right 1st toe (dry gangrene), blistering of the 2nd toe and mild ischemic changes of the 5th toe. She was evaluated at the [facility] today for a decision for hyperbaric oxygen therapy.\par \par PMH: Raynaud's, SLE, HTN, rectal cancer, pacemaker\par \par Medications: Lipitor, Carafate, Aspirin, Clarithromycin, Metoprolol, Flagyl, Protonix, Eliquis\par \par Allergies: ACEI, Irbesartan, Norvasc, Amoxicillin\par \par Social history: Never smoker\par \par FH: NC\par

## 2021-11-23 NOTE — ASSESSMENT
[FreeTextEntry1] : In summary, Mrs. Bogdanowicz p/w RLE PAD/Raynaud's/SLE. KEREN today were 0.53/0.47 (right) and 0.73/0.6 (left). \par -She should continue Aspirin and Eliquis as prescribed\par -I instructed her to restart Lipitor\par -I will reach out to her Cardiologist Dr. Grissom at [facility] ([phone]) to inquire whether she has any contraindication to starting Cilostazol\par -She will follow up with the [facility] for hyperbaric oxygen therapy; she is to apply Betadine to the foot wounds\par -She will follow up with Rheumatology to inquire about any additional treatment needed for Raynaud's/SLE\par -She will follow up once her [facility] evaluation is completed\par

## 2021-11-23 NOTE — PHYSICAL EXAM
[de-identified] : On physical examination the patient is in no acute distress and neurologically intact. The lungs are clear to auscultation and the heart has a regular rate and rhythm. Abdomen- ostomy WNL. Bilateral femoral pulses are palpable. Pedal pulses are non-palpable. Right ankle with some edema. Dry gangrene of 1st distal toe. Ulceration/blistering of 1st webspace/2nd toe, minimal ischemic changes of 5th toe. Small area of skin breakdown over 1st metatarsal head.

## 2021-11-24 NOTE — ED ADULT TRIAGE NOTE - ACCOMPANIED BY
Pt resting in room, pt appears more comfortable and less anxious, breathing now nonlabored, ST on cardiac monitor, skin color wnl, no acute distress noted. Call light within reach, pt updated of POC.   Other

## 2021-11-30 ENCOUNTER — APPOINTMENT (OUTPATIENT)
Dept: WOUND CARE | Facility: CLINIC | Age: 81
End: 2021-11-30

## 2021-12-07 ENCOUNTER — APPOINTMENT (OUTPATIENT)
Dept: WOUND CARE | Facility: CLINIC | Age: 81
End: 2021-12-07

## 2021-12-19 ENCOUNTER — INPATIENT (INPATIENT)
Facility: HOSPITAL | Age: 81
LOS: 7 days | Discharge: HOME HEALTH | DRG: 474 | End: 2021-12-27
Attending: INTERNAL MEDICINE | Admitting: INTERNAL MEDICINE
Payer: MEDICARE

## 2021-12-19 VITALS
SYSTOLIC BLOOD PRESSURE: 134 MMHG | OXYGEN SATURATION: 96 % | HEART RATE: 97 BPM | HEIGHT: 62 IN | DIASTOLIC BLOOD PRESSURE: 81 MMHG | TEMPERATURE: 98 F | RESPIRATION RATE: 18 BRPM | WEIGHT: 125 LBS

## 2021-12-19 DIAGNOSIS — K40.90 UNILATERAL INGUINAL HERNIA, WITHOUT OBSTRUCTION OR GANGRENE, NOT SPECIFIED AS RECURRENT: Chronic | ICD-10-CM

## 2021-12-19 DIAGNOSIS — Z90.49 ACQUIRED ABSENCE OF OTHER SPECIFIED PARTS OF DIGESTIVE TRACT: Chronic | ICD-10-CM

## 2021-12-19 LAB
ALBUMIN SERPL ELPH-MCNC: 3.1 G/DL — LOW (ref 3.3–5)
ALP SERPL-CCNC: 179 U/L — HIGH (ref 40–120)
ALT FLD-CCNC: 12 U/L — SIGNIFICANT CHANGE UP (ref 10–45)
ANION GAP SERPL CALC-SCNC: 12 MMOL/L — SIGNIFICANT CHANGE UP (ref 5–17)
APTT BLD: 22.5 SEC — LOW (ref 27.5–35.5)
AST SERPL-CCNC: 21 U/L — SIGNIFICANT CHANGE UP (ref 10–40)
BASOPHILS # BLD AUTO: 0.02 K/UL — SIGNIFICANT CHANGE UP (ref 0–0.2)
BASOPHILS NFR BLD AUTO: 0.2 % — SIGNIFICANT CHANGE UP (ref 0–2)
BILIRUB SERPL-MCNC: 0.1 MG/DL — LOW (ref 0.2–1.2)
BLD GP AB SCN SERPL QL: NEGATIVE — SIGNIFICANT CHANGE UP
BUN SERPL-MCNC: 23 MG/DL — SIGNIFICANT CHANGE UP (ref 7–23)
CALCIUM SERPL-MCNC: 9.5 MG/DL — SIGNIFICANT CHANGE UP (ref 8.4–10.5)
CHLORIDE SERPL-SCNC: 104 MMOL/L — SIGNIFICANT CHANGE UP (ref 96–108)
CO2 SERPL-SCNC: 19 MMOL/L — LOW (ref 22–31)
CREAT SERPL-MCNC: 0.7 MG/DL — SIGNIFICANT CHANGE UP (ref 0.5–1.3)
EOSINOPHIL # BLD AUTO: 0.28 K/UL — SIGNIFICANT CHANGE UP (ref 0–0.5)
EOSINOPHIL NFR BLD AUTO: 2.8 % — SIGNIFICANT CHANGE UP (ref 0–6)
GLUCOSE SERPL-MCNC: 134 MG/DL — HIGH (ref 70–99)
HCT VFR BLD CALC: 29.4 % — LOW (ref 34.5–45)
HGB BLD-MCNC: 9.3 G/DL — LOW (ref 11.5–15.5)
IMM GRANULOCYTES NFR BLD AUTO: 0.6 % — SIGNIFICANT CHANGE UP (ref 0–1.5)
INR BLD: 1.04 RATIO — SIGNIFICANT CHANGE UP (ref 0.88–1.16)
LYMPHOCYTES # BLD AUTO: 1.17 K/UL — SIGNIFICANT CHANGE UP (ref 1–3.3)
LYMPHOCYTES # BLD AUTO: 11.7 % — LOW (ref 13–44)
MCHC RBC-ENTMCNC: 28.1 PG — SIGNIFICANT CHANGE UP (ref 27–34)
MCHC RBC-ENTMCNC: 31.6 GM/DL — LOW (ref 32–36)
MCV RBC AUTO: 88.8 FL — SIGNIFICANT CHANGE UP (ref 80–100)
MONOCYTES # BLD AUTO: 0.67 K/UL — SIGNIFICANT CHANGE UP (ref 0–0.9)
MONOCYTES NFR BLD AUTO: 6.7 % — SIGNIFICANT CHANGE UP (ref 2–14)
NEUTROPHILS # BLD AUTO: 7.84 K/UL — HIGH (ref 1.8–7.4)
NEUTROPHILS NFR BLD AUTO: 78 % — HIGH (ref 43–77)
NRBC # BLD: 0 /100 WBCS — SIGNIFICANT CHANGE UP (ref 0–0)
PLATELET # BLD AUTO: 357 K/UL — SIGNIFICANT CHANGE UP (ref 150–400)
POTASSIUM SERPL-MCNC: 4.5 MMOL/L — SIGNIFICANT CHANGE UP (ref 3.5–5.3)
POTASSIUM SERPL-SCNC: 4.5 MMOL/L — SIGNIFICANT CHANGE UP (ref 3.5–5.3)
PROT SERPL-MCNC: 6.9 G/DL — SIGNIFICANT CHANGE UP (ref 6–8.3)
PROTHROM AB SERPL-ACNC: 12.5 SEC — SIGNIFICANT CHANGE UP (ref 10.6–13.6)
RBC # BLD: 3.31 M/UL — LOW (ref 3.8–5.2)
RBC # FLD: 15 % — HIGH (ref 10.3–14.5)
RH IG SCN BLD-IMP: NEGATIVE — SIGNIFICANT CHANGE UP
SODIUM SERPL-SCNC: 135 MMOL/L — SIGNIFICANT CHANGE UP (ref 135–145)
WBC # BLD: 10.04 K/UL — SIGNIFICANT CHANGE UP (ref 3.8–10.5)
WBC # FLD AUTO: 10.04 K/UL — SIGNIFICANT CHANGE UP (ref 3.8–10.5)

## 2021-12-19 PROCEDURE — 99285 EMERGENCY DEPT VISIT HI MDM: CPT | Mod: 25,GC

## 2021-12-19 PROCEDURE — 93010 ELECTROCARDIOGRAM REPORT: CPT | Mod: GC

## 2021-12-19 PROCEDURE — 71045 X-RAY EXAM CHEST 1 VIEW: CPT | Mod: 26

## 2021-12-19 PROCEDURE — 73630 X-RAY EXAM OF FOOT: CPT | Mod: 26,RT

## 2021-12-19 RX ORDER — VANCOMYCIN HCL 1 G
1000 VIAL (EA) INTRAVENOUS ONCE
Refills: 0 | Status: COMPLETED | OUTPATIENT
Start: 2021-12-19 | End: 2021-12-19

## 2021-12-19 RX ORDER — CEFEPIME 1 G/1
2000 INJECTION, POWDER, FOR SOLUTION INTRAMUSCULAR; INTRAVENOUS ONCE
Refills: 0 | Status: COMPLETED | OUTPATIENT
Start: 2021-12-19 | End: 2021-12-19

## 2021-12-19 RX ADMIN — CEFEPIME 100 MILLIGRAM(S): 1 INJECTION, POWDER, FOR SOLUTION INTRAMUSCULAR; INTRAVENOUS at 23:55

## 2021-12-19 NOTE — ED ADULT NURSE NOTE - OBJECTIVE STATEMENT
81 y female presents from home with right toe "gangrene" scheduled for surgery Tuesday; amputation of the left toes. Reports to pain worsening today while ambulating. follows with vascular surgery outpatient- had stents placed LLE. right first and second toe black- no drainage. denies fevers, chills, lightheadedness, dizziness, n/v/d. a&ox3. skin warm and dry. breathing comfortably in bed- no distress. abdomen soft nondistended- colostomy in place from hx of colon CA. safety maintained- bed locked in lowest position. vss. awaiting labs, radiology and consult from vascular.

## 2021-12-19 NOTE — ED PROVIDER NOTE - NS ED ROS FT
Constitutional: no fevers, chills  HEENT: no HA, vision changes, rhinorrhea, sore throat  Cardiac: no chest pain, palpitations  Respiratory: no SOB, cough or hemoptysis  GI: no n/v/d/c, abd pain, bloody or dark stools  : no dysuria, frequency, or hematuria  MSK: +foot pain, neck pain or back pain  Skin: no rashes, jaundice, pruritis  Neuro: no numbness/tingling, weakness, unsteady gait  Psych: depression or suicidal thoughts

## 2021-12-19 NOTE — ED PROVIDER NOTE - CLINICAL SUMMARY MEDICAL DECISION MAKING FREE TEXT BOX
Attending MD Werner: 81F with PAD sp angioplasty/stenting 1 day prior as outpatient with vascular surgeon,  HTN, Raynauds presenting at request of her podiatrist for pre-op testing and admission for planned toe amputation 12/21/21. Exam reveals easily palpable R DP pulse, RLE is warm and well perfused clinically. 1st and 2nd toes gangrenous. Some skin breakdown surrounding which could represent wet gangrene. Will obtain pre op labs, XR foot, podiatry consultation, vascular consultation       *The above represents an initial assessment/impression. Please refer to progress notes for potential changes in patient clinical course*

## 2021-12-19 NOTE — ED ADULT NURSE NOTE - NS PRO AD PATIENT TYPE ON CHART
Provider Comments  Provider Comments:   CALLED PT TO R/S MISSED APPT NO ANSWER AND NO MACHINE LADAND      Signatures   Electronically signed by : DANITZA Gaston; Jun 6 2017 11:16AM EST                       (Author)
Health Care Proxy (HCP)

## 2021-12-19 NOTE — ED ADULT TRIAGE NOTE - CHIEF COMPLAINT QUOTE
plan for sx Tuesday for gangrene toe to R foot, sent in by podiatry for medical clearance  c/o R foot pain

## 2021-12-19 NOTE — ED PROVIDER NOTE - OBJECTIVE STATEMENT
pt with h/o raynauds HTN angioedema and rectal ca recently s/p LAR c/b "R cold leg". She has had 2 angiograms, most recently yesterday with 3 stents placed in the pedal vasculature. Pt experiencing dry gangrenous changes of R 1st, 2nd, and 5th toes. sent in for admission pre-op by podiatry Dr Hsu

## 2021-12-19 NOTE — CONSULT NOTE ADULT - SUBJECTIVE AND OBJECTIVE BOX
Podiatry pager #: [phone]/ [phone]    Patient is a 81y old  Female who presents with a chief complaint of R foot gangrene     HPI:  81F with PAD sp angioplasty/stenting 1 day prior as outpatient with vascular surgeon,  HTN, Donavannauds presenting at request of her podiatrist for pre-op testing and admission for planned toe amputation 12/21/21. Exam reveals easily palpable R DP pulse, RLE is warm and well perfused clinically. 1st and 2nd toes gangrenous. Some skin breakdown surrounding which could represent wet gangrene. Will obtain pre op labs, XR foot, podiatry consultation, vascular consultation      PAST MEDICAL & SURGICAL HISTORY:  Hypertension    Osteoporosis    Varicose vein of leg    Raynaud disease    History of left inguinal hernia    Rectal cancer    Inguinal hernia    History of low anterior resection of rectum  diverting colostomy 10/2021 for rectal cancer        MEDICATIONS  (STANDING):  cefepime   IVPB 2000 milliGRAM(s) IV Intermittent once  vancomycin  IVPB. 1000 milliGRAM(s) IV Intermittent once    MEDICATIONS  (PRN):      Allergies    ACE inhibitors (Angioedema)  amoxicillin (Angioedema)  irbesartan (Angioedema)  Norvasc (Angioedema)    Intolerances        VITALS:    Vital Signs Last 24 Hrs  T(C): 36.5 (19 Dec 2021 15:58), Max: 36.5 (19 Dec 2021 15:58)  T(F): 97.7 (19 Dec 2021 15:58), Max: 97.7 (19 Dec 2021 15:58)  HR: 97 (19 Dec 2021 15:58) (97 - 97)  BP: 134/81 (19 Dec 2021 15:58) (134/81 - 134/81)  BP(mean): --  RR: 18 (19 Dec 2021 15:58) (18 - 18)  SpO2: 96% (19 Dec 2021 15:58) (96% - 96%)    LABS:                          9.3    10.04 )-----------( 357      ( 19 Dec 2021 21:30 )             29.4       12-19    135  |  104  |  23  ----------------------------<  134<H>  4.5   |  19<L>  |  0.70    Ca    9.5      19 Dec 2021 21:30    TPro  6.9  /  Alb  3.1<L>  /  TBili  0.1<L>  /  DBili  x   /  AST  21  /  ALT  12  /  AlkPhos  179<H>  12-19      CAPILLARY BLOOD GLUCOSE          PT/INR - ( 19 Dec 2021 21:30 )   PT: 12.5 sec;   INR: 1.04 ratio         PTT - ( 19 Dec 2021 21:30 )  PTT:22.5 sec    LOWER EXTREMITY PHYSICAL EXAM:    Vascular: DP/PT 2/4 to R foot, 0/4 to L foot, CFT diminished to R foot first and second digit, less than 3s to other digits B/L, Temperature gradient warm to warm on R and warm to cool on L.   Neuro: Epicritic sensation intact to the level of digits, B/L.  Musculoskeletal/Ortho: unremarkable   Skin: Right foot first and second digit dry gangrene to the level of the metatarsal phalangeal joints with mild wet conversion at bases, periwound erythema to the forefoot, no crepitus, no purulence, no drainage, and no malodor. R foot heel with abrasion to level of dermis with no signs of infection. Left foot with no clinical signs of infection or lesions.       RADIOLOGY & ADDITIONAL STUDIES:    pending

## 2021-12-19 NOTE — ED PROVIDER NOTE - ATTENDING CONTRIBUTION TO CARE
Attending MD Werner:  I personally have seen and examined this patient. I have performed a substantive portion of the visit including all aspects of the medical decision making.  Resident note reviewed and agree on plan of care and except where noted.  See HPI, PE, and MDM for details.

## 2021-12-19 NOTE — ED PROVIDER NOTE - PHYSICAL EXAMINATION
General: non-toxic, NAD  HEENT: NCAT, PERRL  Cardiac: RRR, no murmurs, 2+ peripheral pulses  Resp: CTAB  Abdomen: soft, non-distended, bowel sounds present, no ttp, no rebound or guarding. no organomegaly  Extremities: R LE peripheral edema, no calf tenderness or erythema  Skin: gangrenous changes 1st 2nd anf 5th digits R foot, dry. palpable pt and dp pulses bilaterally.   Neuro: AAOx4, 5+motor, sensation grossly intact  Psych: mood and affect appropriate

## 2021-12-19 NOTE — CONSULT NOTE ADULT - ASSESSMENT
81F with R foot first and second digit gangrene   - Pt assessed bedside with daughter present   - Afebrile, VSS, WBC 10.04, ESR and CRP pending   - Xrays pending   - Exam: Right foot first and second digit dry gangrene to the level of the metatarsal phalangeal joints with mild wet conversion at bases, periwound erythema to the forefoot, no crepitus, no purulence, no drainage, and no malodor. R foot heel with abrasion to level of dermis with no signs of infection. Left foot with no clinical signs of infection or lesions.   - Recommend admit to medicine   - Recommend IV vancomycin and cefepime   - Wound dressed with betadine and DSD   - Consent for R foot surgery obtained   - Ordered KEREN/PVRs  - Wound culture obtained   - Ordered Z flows   - Please document medical clearance for R foot surgery under light sedation   - Pod plan R foot partial first and second ray resections this Tuesday   - Discussed with attending  81F with R foot first and second digit gangrene   - Pt assessed bedside with daughter present   - Afebrile, VSS, WBC 10.04, ESR and CRP pending   - Xrays pending   - Exam: Right foot first and second digit dry gangrene to the level of the metatarsal phalangeal joints with mild wet conversion at bases, periwound erythema to the forefoot, no crepitus, no purulence, no drainage, and no malodor. R foot heel with abrasion to level of dermis with no signs of infection. Left foot with no clinical signs of infection or lesions.   - Recommend admit to medicine   - Recommend IV vancomycin and cefepime   - Wound dressed with betadine and DSD   - Consent for R foot surgery obtained   - Ordered KEREN/PVRs  - Rec vascular consult  - Wound culture obtained   - Ordered Z flows   - Please document medical clearance for R foot surgery under light sedation   - Pod plan R foot partial first and second ray resections this Tuesday   - Discussed with attending

## 2021-12-20 ENCOUNTER — TRANSCRIPTION ENCOUNTER (OUTPATIENT)
Age: 81
End: 2021-12-20

## 2021-12-20 DIAGNOSIS — I96 GANGRENE, NOT ELSEWHERE CLASSIFIED: ICD-10-CM

## 2021-12-20 LAB — SARS-COV-2 RNA SPEC QL NAA+PROBE: SIGNIFICANT CHANGE UP

## 2021-12-20 PROCEDURE — 93922 UPR/L XTREMITY ART 2 LEVELS: CPT | Mod: 26

## 2021-12-20 RX ORDER — ASPIRIN/CALCIUM CARB/MAGNESIUM 324 MG
1 TABLET ORAL
Qty: 0 | Refills: 0 | DISCHARGE

## 2021-12-20 RX ORDER — ATORVASTATIN CALCIUM 80 MG/1
10 TABLET, FILM COATED ORAL AT BEDTIME
Refills: 0 | Status: DISCONTINUED | OUTPATIENT
Start: 2021-12-20 | End: 2021-12-21

## 2021-12-20 RX ORDER — PANTOPRAZOLE SODIUM 20 MG/1
40 TABLET, DELAYED RELEASE ORAL
Refills: 0 | Status: DISCONTINUED | OUTPATIENT
Start: 2021-12-20 | End: 2021-12-21

## 2021-12-20 RX ORDER — OXYCODONE HYDROCHLORIDE 5 MG/1
10 TABLET ORAL EVERY 4 HOURS
Refills: 0 | Status: DISCONTINUED | OUTPATIENT
Start: 2021-12-20 | End: 2021-12-21

## 2021-12-20 RX ORDER — CEFEPIME 1 G/1
2000 INJECTION, POWDER, FOR SOLUTION INTRAMUSCULAR; INTRAVENOUS EVERY 12 HOURS
Refills: 0 | Status: DISCONTINUED | OUTPATIENT
Start: 2021-12-20 | End: 2021-12-21

## 2021-12-20 RX ORDER — METOPROLOL TARTRATE 50 MG
0.5 TABLET ORAL
Qty: 0 | Refills: 0 | DISCHARGE

## 2021-12-20 RX ORDER — INFLUENZA VIRUS VACCINE 15; 15; 15; 15 UG/.5ML; UG/.5ML; UG/.5ML; UG/.5ML
0.7 SUSPENSION INTRAMUSCULAR ONCE
Refills: 0 | Status: COMPLETED | OUTPATIENT
Start: 2021-12-20 | End: 2021-12-20

## 2021-12-20 RX ORDER — APIXABAN 2.5 MG/1
1 TABLET, FILM COATED ORAL
Qty: 0 | Refills: 0 | DISCHARGE

## 2021-12-20 RX ORDER — METOPROLOL TARTRATE 50 MG
12.5 TABLET ORAL EVERY 12 HOURS
Refills: 0 | Status: DISCONTINUED | OUTPATIENT
Start: 2021-12-20 | End: 2021-12-21

## 2021-12-20 RX ORDER — ACETAMINOPHEN 500 MG
650 TABLET ORAL EVERY 6 HOURS
Refills: 0 | Status: DISCONTINUED | OUTPATIENT
Start: 2021-12-20 | End: 2021-12-21

## 2021-12-20 RX ORDER — OXYCODONE HYDROCHLORIDE 5 MG/1
5 TABLET ORAL EVERY 4 HOURS
Refills: 0 | Status: DISCONTINUED | OUTPATIENT
Start: 2021-12-20 | End: 2021-12-21

## 2021-12-20 RX ORDER — VANCOMYCIN HCL 1 G
1000 VIAL (EA) INTRAVENOUS EVERY 12 HOURS
Refills: 0 | Status: DISCONTINUED | OUTPATIENT
Start: 2021-12-20 | End: 2021-12-21

## 2021-12-20 RX ADMIN — PANTOPRAZOLE SODIUM 40 MILLIGRAM(S): 20 TABLET, DELAYED RELEASE ORAL at 12:11

## 2021-12-20 RX ADMIN — Medication 650 MILLIGRAM(S): at 11:07

## 2021-12-20 RX ADMIN — OXYCODONE HYDROCHLORIDE 10 MILLIGRAM(S): 5 TABLET ORAL at 22:02

## 2021-12-20 RX ADMIN — ATORVASTATIN CALCIUM 10 MILLIGRAM(S): 80 TABLET, FILM COATED ORAL at 22:02

## 2021-12-20 RX ADMIN — OXYCODONE HYDROCHLORIDE 10 MILLIGRAM(S): 5 TABLET ORAL at 11:06

## 2021-12-20 RX ADMIN — OXYCODONE HYDROCHLORIDE 10 MILLIGRAM(S): 5 TABLET ORAL at 11:59

## 2021-12-20 RX ADMIN — OXYCODONE HYDROCHLORIDE 10 MILLIGRAM(S): 5 TABLET ORAL at 22:32

## 2021-12-20 RX ADMIN — Medication 250 MILLIGRAM(S): at 12:11

## 2021-12-20 RX ADMIN — Medication 650 MILLIGRAM(S): at 11:59

## 2021-12-20 RX ADMIN — CEFEPIME 100 MILLIGRAM(S): 1 INJECTION, POWDER, FOR SOLUTION INTRAMUSCULAR; INTRAVENOUS at 11:07

## 2021-12-20 RX ADMIN — CEFEPIME 100 MILLIGRAM(S): 1 INJECTION, POWDER, FOR SOLUTION INTRAMUSCULAR; INTRAVENOUS at 22:02

## 2021-12-20 RX ADMIN — Medication 250 MILLIGRAM(S): at 01:32

## 2021-12-20 NOTE — CONSULT NOTE ADULT - SUBJECTIVE AND OBJECTIVE BOX
GENERAL SURGERY CONSULT NOTE  Consulting surgical team:  Consulting attending:    HPI:  HPI:      PAST MEDICAL HISTORY:  Hypertension    Osteoporosis    Varicose vein of leg    Raynaud disease    History of left inguinal hernia    Rectal cancer        PAST SURGICAL HISTORY:  Inguinal hernia    History of low anterior resection of rectum        MEDICATIONS:  vancomycin  IVPB. 1000 milliGRAM(s) IV Intermittent once      ALLERGIES:  ACE inhibitors (Angioedema)  amoxicillin (Angioedema)  irbesartan (Angioedema)  Norvasc (Angioedema)      VITALS & I/Os:  Vital Signs Last 24 Hrs  T(C): 36.5 (19 Dec 2021 15:58), Max: 36.5 (19 Dec 2021 15:58)  T(F): 97.7 (19 Dec 2021 15:58), Max: 97.7 (19 Dec 2021 15:58)  HR: 97 (19 Dec 2021 15:58) (97 - 97)  BP: 134/81 (19 Dec 2021 15:58) (134/81 - 134/81)  BP(mean): --  RR: 18 (19 Dec 2021 15:58) (18 - 18)  SpO2: 96% (19 Dec 2021 15:58) (96% - 96%)    I&O's Summary      PHYSICAL EXAM:  GEN: resting comfortably in bed, in NAD  HEENT: normocephalic, non-tender to palpation, no abrasions visible, no step-offs palpated  NECK: no JVD, non-tender to palpation at posterior midline, no pain with flexion, extension, and bilateral neck rotation  CHEST: non-tender to palpation across clavicles and b/l anterior ribs  BACK: non-tender to palpation along cervical, thoracic, lumbar spine midline and b/l posterior ribs; no palpable step-offs or hematomas  ABD: soft, non-distended, non-tender   RECTUM: good rectal tone  LUE: non-tender to palpation across upper and lower arm, 5/5  strength, fingers warm, well-perfused, full ROM in shoulder, elbow, wrist, and fingers, palpable radial + ulnar pulses  RUE: non-tender to palpation across upper and lower arm, 5/5  strength, fingers warm, well-perfused, full ROM in shoulder, elbow, wrist, and fingers, palpable radial + ulnar pulses  LLE: non-tender to palpation across upper and lower leg; full ROM in hip, knee, ankle, and toes, 5/5 dorsiflexion + plantarflexion, palpable DP + PT pulses; warm, well-perfused  RLE: non-tender to palpation across upper and lower leg; full ROM in hip, knee, ankle, and toes, 5/5 dorsiflexion + plantarflexion, palpable DP + PT pulses; warm, well-perfused  NEURO: AAOx4, no focal neuro deficits; CN II-IX intact    LABS:                        9.3    10.04 )-----------( 357      ( 19 Dec 2021 21:30 )             29.4     12-19    135  |  104  |  23  ----------------------------<  134<H>  4.5   |  19<L>  |  0.70    Ca    9.5      19 Dec 2021 21:30    TPro  6.9  /  Alb  3.1<L>  /  TBili  0.1<L>  /  DBili  x   /  AST  21  /  ALT  12  /  AlkPhos  179<H>  12-19    Lactate:    PT/INR - ( 19 Dec 2021 21:30 )   PT: 12.5 sec;   INR: 1.04 ratio         PTT - ( 19 Dec 2021 21:30 )  PTT:22.5 sec              IMAGING:   GENERAL SURGERY CONSULT NOTE  Consulting surgical team: Vascular Surgery  Consulting attending: Dr. Araujo    HPI:  80yo F with Hx Raynaud's, lupus, HTN, rectal CA (s/p LAR and diverting colostomy at [facility] earlier this year c/b post-op cold leg) and recent admission for high-grade SBO and cold R leg on 10/28/21 who presents as pre-op for podiatry amputation for R 1st and 2nd toe gangrene by Dr. Hsu. Patient states that she saw Dr. Newton, an interventional cardiologist, yesterday, who placed 3 stents with successful revascularization of pedal vasculature. Vascular surgery consulted due to patient reporting that she was seen by vascular surgeon Dr. Newton yesterday and for swelling in RLE.     PAST MEDICAL HISTORY:  Hypertension  Osteoporosis  Varicose vein of leg  Raynaud disease  History of left inguinal hernia  Rectal cancer    PAST SURGICAL HISTORY:  Inguinal hernia  History of low anterior resection of rectum    MEDICATIONS:  vancomycin  IVPB. 1000 milliGRAM(s) IV Intermittent once    ALLERGIES:  ACE inhibitors (Angioedema)  amoxicillin (Angioedema)  irbesartan (Angioedema)  Norvasc (Angioedema)    VITALS & I/Os:  Vital Signs Last 24 Hrs  T(C): 36.5 (19 Dec 2021 15:58), Max: 36.5 (19 Dec 2021 15:58)  T(F): 97.7 (19 Dec 2021 15:58), Max: 97.7 (19 Dec 2021 15:58)  HR: 97 (19 Dec 2021 15:58) (97 - 97)  BP: 134/81 (19 Dec 2021 15:58) (134/81 - 134/81)  RR: 18 (19 Dec 2021 15:58) (18 - 18)  SpO2: 96% (19 Dec 2021 15:58) (96% - 96%)      PHYSICAL EXAM:  GEN: resting in bed comfortably in NAD  NEURO: awake, alert  CV: warm, well-perfused  RESP: no increased WOB  ABD: soft, non-distended, non-tender, stoma pink and viable, productive of gas and stool in bag   EXTR: no gross deformities; spontaneous movement in b/l UE  - LLE: palpable DP + PT pulses; warm, well-perfused  - RLE: palpable DP + PT pulses; warm, well-perfused; dry gangrenous changes of R 1st and 2nd toes and distal aspect of 5th toe without malodor or purulence; RLE with 1+ non-pitting edema to mid-shin     LABS:             9.3    10.04 )-----------( 357      ( 19 Dec 2021 21:30 )             29.4     135  |  104  |  23  ----------------------------<  134<H>  4.5   |  19<L>  |  0.70    Ca    9.5      19 Dec 2021 21:30    TPro  6.9  /  Alb  3.1<L>  /  TBili  0.1<L>  /  DBili  x   /  AST  21  /  ALT  12  /  AlkPhos  179<H>  12-19    Lactate:    PT/INR - ( 19 Dec 2021 21:30 )   PT: 12.5 sec;   INR: 1.04 ratio    PTT - ( 19 Dec 2021 21:30 )  PTT:22.5 sec    IMAGING:    XR R Foot - 12/19:   FINDINGS:    No acute fracture.  Small focus of air adjacent to the first distal phalanx. No bony erosive   changes.    IMPRESSION:  Small focus of air adjacent to the first distal phalanx. No radiographic   evidence for osteomyelitis. However, MRI foot  with IV contrast is   recommended for further evaluation clinically warranted.

## 2021-12-20 NOTE — H&P ADULT - ASSESSMENT
81y Female complaining of pain, foot.  pt with h/o raynauds HTN angioedema and rectal ca recently s/p LAR c/b "R cold leg". She has had 2 angiograms, most recently yesterday with 3 stents placed in the pedal vasculature. Pt experiencing dry gangrenous changes of R 1st, 2nd, and 5th toes. sent in for admission pre-op by podiatry Dr Hsu     81y Female complaining of pain, foot.  pt with h/o raynauds HTN angioedema and rectal ca recently s/p LAR c/b "R cold leg". She has had 2 angiograms, most recently yesterday with 3 stents placed in the pedal vasculature. Pt experiencing dry gangrenous changes of R 1st, 2nd, and 5th toes. sent in for admission pre-op by podiatry Dr Hsu    Gangrene of right first and second toe  - cont iv abx  - ID consult  - pain control with oxycodoner  - scheduled for amputation tomorrow  - pt has no medical contraindications for the planned procedure    HTN  - c/w metoprolol    HTN  - c/w lipitor    jonnas  - hold asa and eliquis for now and resume post op

## 2021-12-20 NOTE — H&P ADULT - NSHPLABSRESULTS_GEN_ALL_CORE
LABS:                        9.3    10.04 )-----------( 357      ( 19 Dec 2021 21:30 )             29.4     12-19    135  |  104  |  23  ----------------------------<  134<H>  4.5   |  19<L>  |  0.70    Ca    9.5      19 Dec 2021 21:30    TPro  6.9  /  Alb  3.1<L>  /  TBili  0.1<L>  /  DBili  x   /  AST  21  /  ALT  12  /  AlkPhos  179<H>  12-19    PT/INR - ( 19 Dec 2021 21:30 )   PT: 12.5 sec;   INR: 1.04 ratio         PTT - ( 19 Dec 2021 21:30 )  PTT:22.5 sec          RADIOLOGY & ADDITIONAL TESTS:    Imaging Personally Reviewed:    Consultant(s) Notes Reviewed:      Care Discussed with Consultants/Other Providers:

## 2021-12-20 NOTE — PROGRESS NOTE ADULT - ASSESSMENT
81F with R foot first and second digit gangrene   - Pt assessed bedside with daughter present   - Afebrile to 99.1, WBC 10.04, ESR and CRP pending   - Right foot xray: hallux small foci of air adjacent to distal phalanx, no OM   - Exam: Right foot first and second digit dry gangrene to the level of the metatarsal phalangeal joints with mild wet conversion at bases, periwound erythema to the forefoot, no crepitus, no purulence, no drainage, and no malodor. R foot heel with abrasion to level of dermis with no signs of infection. Left foot with no clinical signs of infection or lesions.   - Recommend IV vancomycin and cefepime   - Wound dressed with betadine and DSD   - No KEREN/PVR needed, obtained records from outpatient vascular doctor Dr. Newton   - [facility] garcia appreciatd   - Right wound culture results pending  - Ordered Z flows   - Please document medical clearance for R foot surgery under light sedation   - Pod plan R foot partial first and second ray resections this Tuesday   - Discussed with attending      81F with R foot first and second digit gangrene   - Pt assessed bedside with daughter present   - Afebrile to 99.1, WBC 10.04, ESR and CRP pending   - Right foot xray: hallux small foci of air adjacent to distal phalanx, no OM   - Exam: Right foot first and second digit dry gangrene to the level of the metatarsal phalangeal joints with mild wet conversion at bases, periwound erythema to the forefoot, no crepitus, no purulence, no drainage, and no malodor. R foot heel with abrasion to level of dermis with no signs of infection. Left foot with no clinical signs of infection or lesions.   - Recommend IV vancomycin and cefepime   - Wound dressed with betadine and DSD   - No KEREN/PVR needed, obtained records from outpatient vascular doctor Dr. Newton   - [facility] recs appreciatd   - Right wound culture results pending  - Ordered Z flows   - Please document medical clearance for R foot surgery under light sedation   - Pod plan booked for R foot partial first and second ray resections tomorrow 12/22 at 330pm w/ Dr. Hsu   - NPO at midnight   - Discussed with attending    81F with R foot first and second digit gangrene   - Pt assessed bedside with daughter present   - Afebrile to 99.1, WBC 10.04, ESR and CRP pending   - Right foot xray: hallux small foci of air adjacent to distal phalanx, no OM   - Exam: Right foot first and second digit dry gangrene to the level of the metatarsal phalangeal joints with mild wet conversion at bases, periwound erythema to the forefoot, no crepitus, no purulence, no drainage, and no malodor. R foot heel with abrasion to level of dermis with no signs of infection. Left foot with no clinical signs of infection or lesions.   - Recommend IV vancomycin and cefepime   - Wound dressed with betadine and DSD   - No KEREN/PVR needed, obtained records from outpatient vascular doctor Dr. Newton   - [facility] recs appreciatd   - Right wound culture results pending  - Ordered Z flows   - Please document medical clearance for R foot surgery under light sedation   - Pod plan booked for R foot GAURI w/ ROB tomorrow 12/22 at 330pm w/ Dr. Hsu   - NPO at midnight   - Discussed with attending

## 2021-12-20 NOTE — PATIENT PROFILE ADULT - LANGUAGE ASSISTANCE NEEDED
No-Patient/Caregiver offered and refused free interpretation services. nurse speaks fluent polish/No-Patient/Caregiver offered and refused free interpretation services.

## 2021-12-20 NOTE — PATIENT PROFILE ADULT - FALL HARM RISK - HARM RISK INTERVENTIONS
Communicate Risk of Fall with Harm to all staff/Reinforce activity limits and safety measures with patient and family/Tailored Fall Risk Interventions/Visual Cue: Yellow wristband and red socks/Bed in lowest position, wheels locked, appropriate side rails in place/Call bell, personal items and telephone in reach/Instruct patient to call for assistance before getting out of bed or chair/Non-slip footwear when patient is out of bed/[place] to call system/Physically safe environment - no spills, clutter or unnecessary equipment/Purposeful Proactive Rounding/Room/bathroom lighting operational, light cord in reach Assistance with ambulation/Assistance OOB with selected safe patient handling equipment/Communicate Risk of Fall with Harm to all staff/Discuss with provider need for PT consult/Monitor gait and stability/Provide patient with walking aids - walker, cane, crutches/Reinforce activity limits and safety measures with patient and family/Sit up slowly, dangle for a short time, stand at bedside before walking/Tailored Fall Risk Interventions/Visual Cue: Yellow wristband and red socks/Bed in lowest position, wheels locked, appropriate side rails in place/Call bell, personal items and telephone in reach/Instruct patient to call for assistance before getting out of bed or chair/Non-slip footwear when patient is out of bed/[place] to call system/Physically safe environment - no spills, clutter or unnecessary equipment/Purposeful Proactive Rounding/Room/bathroom lighting operational, light cord in reach

## 2021-12-20 NOTE — CONSULT NOTE ADULT - ASSESSMENT
81F hx of Raynaud's, lupus, HTN, known L inguinal hernia planned for repair later this year, rectal cancer s/p LAR and diverting transverse colostomy at [facility] 2 weeks ago c/b post op R cold leg on eliquis here with 1 day of hematemesis and melena through ostomy found to have high grade SBO 2/2 L inguinal hernia and 3v occlusion to R foot. Palpable femoral and popliteal RLE, no pedal signals, R distal foot blue/purplish in color. pain in R foot, able to ambulate with assistance. Motor and sensation intact in RLE. Vascular surgery consulted for cold leg. Currently admitted to gen surg going emergently to OR for L inguinal hernia repair and scope with GI. Holding AC given acute GI bleed.    Previously known to Dr. Araujo, seen 4/2021 outpatient for venous insufficiency and PAD   80yo F with Hx Raynaud's, lupus, HTN, rectal CA (s/p LAR and diverting colostomy at [facility] earlier this year c/b post-op cold leg) and recent admission for high-grade SBO and cold R leg on 10/28/21 who presents as pre-op for podiatry amputation for R 1st and 2nd toe gangrene s/p RLE angio + stent x3 on 12/18 by Interventional Cardiology. Vascular Surgery consulted for RLE swelling.     PLAN:   - No acute vascular surgery intervention at this time  - Podiatry plan for amputation noted  - Patient recently underwent revascularization procedure by Interventional Cardiology yesterday - recommend evaluation by interventional cardiology and medical teams   - Abx for gangrene of RLE digits    Patient seen and discussed with Vascular Surgery fellow, Dr. Marr, on behalf of Dr. Van Bains, PGY-2  Vascular Surgery  #[id number]

## 2021-12-21 ENCOUNTER — TRANSCRIPTION ENCOUNTER (OUTPATIENT)
Age: 81
End: 2021-12-21

## 2021-12-21 ENCOUNTER — RESULT REVIEW (OUTPATIENT)
Age: 81
End: 2021-12-21

## 2021-12-21 LAB
ANION GAP SERPL CALC-SCNC: 11 MMOL/L — SIGNIFICANT CHANGE UP (ref 5–17)
APTT BLD: 26.5 SEC — LOW (ref 27.5–35.5)
BLD GP AB SCN SERPL QL: NEGATIVE — SIGNIFICANT CHANGE UP
BUN SERPL-MCNC: 17 MG/DL — SIGNIFICANT CHANGE UP (ref 7–23)
CALCIUM SERPL-MCNC: 9.5 MG/DL — SIGNIFICANT CHANGE UP (ref 8.4–10.5)
CHLORIDE SERPL-SCNC: 102 MMOL/L — SIGNIFICANT CHANGE UP (ref 96–108)
CO2 SERPL-SCNC: 20 MMOL/L — LOW (ref 22–31)
CREAT SERPL-MCNC: 0.61 MG/DL — SIGNIFICANT CHANGE UP (ref 0.5–1.3)
GLUCOSE BLDC GLUCOMTR-MCNC: 95 MG/DL — SIGNIFICANT CHANGE UP (ref 70–99)
GLUCOSE SERPL-MCNC: 103 MG/DL — HIGH (ref 70–99)
HCT VFR BLD CALC: 29.6 % — LOW (ref 34.5–45)
HGB BLD-MCNC: 9.6 G/DL — LOW (ref 11.5–15.5)
INR BLD: 1.07 RATIO — SIGNIFICANT CHANGE UP (ref 0.88–1.16)
MCHC RBC-ENTMCNC: 28.2 PG — SIGNIFICANT CHANGE UP (ref 27–34)
MCHC RBC-ENTMCNC: 32.4 GM/DL — SIGNIFICANT CHANGE UP (ref 32–36)
MCV RBC AUTO: 86.8 FL — SIGNIFICANT CHANGE UP (ref 80–100)
NRBC # BLD: 0 /100 WBCS — SIGNIFICANT CHANGE UP (ref 0–0)
PLATELET # BLD AUTO: 357 K/UL — SIGNIFICANT CHANGE UP (ref 150–400)
POTASSIUM SERPL-MCNC: 3.8 MMOL/L — SIGNIFICANT CHANGE UP (ref 3.5–5.3)
POTASSIUM SERPL-SCNC: 3.8 MMOL/L — SIGNIFICANT CHANGE UP (ref 3.5–5.3)
PROTHROM AB SERPL-ACNC: 12.8 SEC — SIGNIFICANT CHANGE UP (ref 10.6–13.6)
RBC # BLD: 3.41 M/UL — LOW (ref 3.8–5.2)
RBC # FLD: 15 % — HIGH (ref 10.3–14.5)
RH IG SCN BLD-IMP: NEGATIVE — SIGNIFICANT CHANGE UP
SODIUM SERPL-SCNC: 133 MMOL/L — LOW (ref 135–145)
WBC # BLD: 8.15 K/UL — SIGNIFICANT CHANGE UP (ref 3.8–10.5)
WBC # FLD AUTO: 8.15 K/UL — SIGNIFICANT CHANGE UP (ref 3.8–10.5)

## 2021-12-21 PROCEDURE — 88304 TISSUE EXAM BY PATHOLOGIST: CPT | Mod: 26

## 2021-12-21 PROCEDURE — 73630 X-RAY EXAM OF FOOT: CPT | Mod: 26,RT

## 2021-12-21 RX ORDER — ACETAMINOPHEN 500 MG
650 TABLET ORAL EVERY 6 HOURS
Refills: 0 | Status: DISCONTINUED | OUTPATIENT
Start: 2021-12-21 | End: 2021-12-27

## 2021-12-21 RX ORDER — ONDANSETRON 8 MG/1
4 TABLET, FILM COATED ORAL ONCE
Refills: 0 | Status: DISCONTINUED | OUTPATIENT
Start: 2021-12-21 | End: 2021-12-21

## 2021-12-21 RX ORDER — CEFEPIME 1 G/1
2000 INJECTION, POWDER, FOR SOLUTION INTRAMUSCULAR; INTRAVENOUS EVERY 12 HOURS
Refills: 0 | Status: DISCONTINUED | OUTPATIENT
Start: 2021-12-21 | End: 2021-12-27

## 2021-12-21 RX ORDER — ATORVASTATIN CALCIUM 80 MG/1
10 TABLET, FILM COATED ORAL AT BEDTIME
Refills: 0 | Status: DISCONTINUED | OUTPATIENT
Start: 2021-12-21 | End: 2021-12-27

## 2021-12-21 RX ORDER — PANTOPRAZOLE SODIUM 20 MG/1
40 TABLET, DELAYED RELEASE ORAL
Refills: 0 | Status: DISCONTINUED | OUTPATIENT
Start: 2021-12-21 | End: 2021-12-27

## 2021-12-21 RX ORDER — OXYCODONE HYDROCHLORIDE 5 MG/1
5 TABLET ORAL EVERY 4 HOURS
Refills: 0 | Status: DISCONTINUED | OUTPATIENT
Start: 2021-12-21 | End: 2021-12-22

## 2021-12-21 RX ORDER — OXYCODONE HYDROCHLORIDE 5 MG/1
10 TABLET ORAL EVERY 4 HOURS
Refills: 0 | Status: DISCONTINUED | OUTPATIENT
Start: 2021-12-21 | End: 2021-12-22

## 2021-12-21 RX ORDER — METOPROLOL TARTRATE 50 MG
12.5 TABLET ORAL EVERY 12 HOURS
Refills: 0 | Status: DISCONTINUED | OUTPATIENT
Start: 2021-12-21 | End: 2021-12-27

## 2021-12-21 RX ORDER — HYDROMORPHONE HYDROCHLORIDE 2 MG/ML
0.25 INJECTION INTRAMUSCULAR; INTRAVENOUS; SUBCUTANEOUS
Refills: 0 | Status: DISCONTINUED | OUTPATIENT
Start: 2021-12-21 | End: 2021-12-21

## 2021-12-21 RX ADMIN — CEFEPIME 100 MILLIGRAM(S): 1 INJECTION, POWDER, FOR SOLUTION INTRAMUSCULAR; INTRAVENOUS at 23:57

## 2021-12-21 RX ADMIN — Medication 650 MILLIGRAM(S): at 05:02

## 2021-12-21 RX ADMIN — PANTOPRAZOLE SODIUM 40 MILLIGRAM(S): 20 TABLET, DELAYED RELEASE ORAL at 06:26

## 2021-12-21 RX ADMIN — Medication 650 MILLIGRAM(S): at 05:32

## 2021-12-21 RX ADMIN — Medication 12.5 MILLIGRAM(S): at 05:02

## 2021-12-21 RX ADMIN — ATORVASTATIN CALCIUM 10 MILLIGRAM(S): 80 TABLET, FILM COATED ORAL at 22:26

## 2021-12-21 RX ADMIN — Medication 250 MILLIGRAM(S): at 00:41

## 2021-12-21 RX ADMIN — Medication 250 MILLIGRAM(S): at 13:48

## 2021-12-21 RX ADMIN — CEFEPIME 100 MILLIGRAM(S): 1 INJECTION, POWDER, FOR SOLUTION INTRAMUSCULAR; INTRAVENOUS at 11:03

## 2021-12-21 RX ADMIN — OXYCODONE HYDROCHLORIDE 5 MILLIGRAM(S): 5 TABLET ORAL at 11:38

## 2021-12-21 RX ADMIN — OXYCODONE HYDROCHLORIDE 5 MILLIGRAM(S): 5 TABLET ORAL at 10:59

## 2021-12-21 NOTE — DISCHARGE NOTE PROVIDER - NSDCMRMEDTOKEN_GEN_ALL_CORE_FT
apixaban 2.5 mg oral tablet: 1 tab(s) orally 2 times a day  aspirin 81 mg oral delayed release tablet: 1 tab(s) orally once a day  atorvastatin 10 mg oral tablet: 1 tab(s) orally once a day  metoprolol tartrate 25 mg oral tablet: 0.5 tab(s) orally every 12 hours  Percocet 5/325 oral tablet: 1 tab(s) orally 3 times a day, As Needed  Protonix 40 mg oral delayed release tablet: 1 tab(s) orally 2 times a day   apixaban 2.5 mg oral tablet: 1 tab(s) orally 2 times a day  aspirin 81 mg oral delayed release tablet: 1 tab(s) orally once a day  atorvastatin 10 mg oral tablet: 1 tab(s) orally once a day  metoprolol tartrate 25 mg oral tablet: 0.5 tab(s) orally every 12 hours  Percocet 5/325 oral tablet: 1 tab(s) orally 3 times a day, As Needed  Protonix 40 mg oral delayed release tablet: 1 tab(s) orally 2 times a day  Right CAM Walker Boot: Please dispense one right CAM walker boot  Ind: to be worn as instructed   Dx: Right foot transmetatarsal amputation w/ tendoachilles lengthening    apixaban 2.5 mg oral tablet: 1 tab(s) orally 2 times a day  ascorbic acid 500 mg oral tablet: 1 tab(s) orally once a day  aspirin 81 mg oral delayed release tablet: 1 tab(s) orally once a day  atorvastatin 10 mg oral tablet: 1 tab(s) orally once a day (at bedtime)  Bactrim  mg-160 mg oral tablet: 1 tab(s) orally every 12 hours   metoprolol tartrate 25 mg oral tablet: 0.5 tab(s) orally every 12 hours  Multiple Vitamins oral tablet: 1 tab(s) orally once a day  pantoprazole 40 mg oral delayed release tablet: 1 tab(s) orally once a day (before a meal)  Percocet 5/325 oral tablet: 1 tab(s) orally 3 times a day, As Needed

## 2021-12-21 NOTE — BRIEF OPERATIVE NOTE - NSICDXBRIEFPROCEDURE_GEN_ALL_CORE_FT
PROCEDURES:  Transmetatarsal amputation 21-Dec-2021 19:42:40  Jarad Her  Lengthening of right Achilles tendon 21-Dec-2021 19:43:08  Jarad Her

## 2021-12-21 NOTE — BRIEF OPERATIVE NOTE - OPERATION/FINDINGS
s/p right foto transmetatarsal amputation with tendoachilles lengthening - closed, good intraop bleeding, good bone quality at the level of resection, low concern for residual infection, low concern for viability

## 2021-12-21 NOTE — PROGRESS NOTE ADULT - ASSESSMENT
82yo F with Hx Raynaud's, lupus, HTN, rectal CA (s/p LAR and diverting colostomy at [facility] earlier this year c/b post-op cold leg) and recent admission for high-grade SBO and cold R leg on 10/28/21 who presents as pre-op for podiatry amputation for R 1st and 2nd toe gangrene s/p RLE angio + stent x3 on 12/18 by Interventional Cardiology. Vascular Surgery consulted for RLE swelling.     PLAN:   - No acute vascular surgery intervention at this time  - Podiatry plan for TMA today  - Patient recently underwent revascularization procedure by Interventional Cardiology prior to admission - recommend evaluation by interventional cardiology and medical teams   - Abx for gangrene of RLE digits  - KEREN/PVR results noted    LULA Luther, PGY2  Vascular Surgery [id number]

## 2021-12-21 NOTE — DISCHARGE NOTE PROVIDER - CARE PROVIDERS DIRECT ADDRESSES
[email or web address]

## 2021-12-21 NOTE — DISCHARGE NOTE PROVIDER - HOSPITAL COURSE
81y Female complaining of pain, foot.  pt with h/o raynauds HTN angioedema and rectal ca recently s/p LAR c/b "R cold leg". She has had 2 angiograms, most recently yesterday with 3 stents placed in the pedal vasculature. Pt experiencing dry gangrenous changes of R 1st, 2nd, and 5th toes. sent in for admission pre-op by podiatry Dr Hsu    Gangrene of right first and second toe  - Received IV abx  - s/p RF TMA w/ ROB closed (DOS 12/21)  - afebrile, no leukocytosis  - s/p 12/21 RF TMA w/ ROB closed, sutures intact, well coapted, no dehiscence, mild periwound erythema distal stump  - low concern for bone infection  - high concern for viability given vasc history but bled well intraop, flaps warm and viable   - CAM boot delivered, pt WBAT to left foot in CAM   - OR Wound culture growing MRSA and Proteus   - ID and pod plan to d/c on PO Bactrim 10 days for soft tissue coverage   - Pt seen by PT  - Outpatient follow up     Pt is medically stable and optimized to DC per attending. 81y Female complaining of pain, foot.  pt with h/o raynauds HTN angioedema and rectal ca recently s/p LAR c/b "R cold leg". She has had 2 angiograms, most recently yesterday with 3 stents placed in the pedal vasculature. Pt experiencing dry gangrenous changes of R 1st, 2nd, and 5th toes. sent in for admission pre-op by podiatry Dr Hsu    Gangrene of right first and second toe  - Received IV abx  - s/p RF TMA w/ ROB closed (DOS 12/21)  - afebrile, no leukocytosis  - s/p 12/21 RF TMA w/ ROB closed, sutures intact, well coapted, no dehiscence, mild periwound erythema distal stump  - low concern for bone infection  - high concern for viability given vasc history but bled well intraop, flaps warm and viable   - CAM boot delivered, pt WBAT to left foot in CAM   - OR Wound culture growing MRSA and Proteus   - ID and pod plan to d/c on PO Bactrim 7 days for soft tissue coverage   - Pt seen by PT  - Outpatient follow up     Pt is medically stable and optimized to DC per attending.

## 2021-12-21 NOTE — DISCHARGE NOTE PROVIDER - PROVIDER TOKENS
PROVIDER:[TOKEN:[[id number]],FOLLOWUP:[2 weeks]] PROVIDER:[TOKEN:[[id number]],FOLLOWUP:[2 weeks],ESTABLISHEDPATIENT:[T]],PROVIDER:[TOKEN:[[id number]],FOLLOWUP:[1 week],ESTABLISHEDPATIENT:[T]]

## 2021-12-21 NOTE — DISCHARGE NOTE PROVIDER - NSDCFUADDINST_GEN_ALL_CORE_FT
Podiatry Discharge Instructions:  Follow up: Please follow up with Dr. Hsu within 1 week of discharge from the hospital, please call [phone] for appointment and discuss that you recently were seen in the hospital.  Wound Care: Please leave your dressing clean dry intact until your follow up appointment.  Weight bearing: Please remain nonweight bearing to the right foot in a CAM Walker boot.   Antibiotics: Please continue as instructed.

## 2021-12-21 NOTE — DISCHARGE NOTE PROVIDER - NSDCCPCAREPLAN_GEN_ALL_CORE_FT
PRINCIPAL DISCHARGE DIAGNOSIS  Diagnosis: Toe gangrene  Assessment and Plan of Treatment:        PRINCIPAL DISCHARGE DIAGNOSIS  Diagnosis: Toe gangrene  Assessment and Plan of Treatment: - Received IV abx  - Had right transmetatarsal amputation, Lengthening of right Achilles tendon on 12/21/21  - sutures intact, well coapted, no dehiscence, mild periwound erythema distal stump  - low concern for bone infection  - high concern for viability given vasc history but bled well intraop, flaps warm and viable   - CAM boot delivered, pt weight bearing as tolerated to left foot in CAM   -Please remain nonweight bearing to the right foot in a CAM Walker boot.   - OR Wound culture growing MRSA and Proteus   - ID and pod plan to d/c on PO Bactrim 10 days for soft tissue coverage   - Pt seen by PT  - Outpatient follow up          PRINCIPAL DISCHARGE DIAGNOSIS  Diagnosis: Toe gangrene  Assessment and Plan of Treatment: - Seen by Podiatry and Infectious disease.   - Had right transmetatarsal amputation, Lengthening of right Achilles tendon on 12/21/21  - sutures intact, well coapted, no dehiscence, mild periwound erythema distal stump  - low concern for bone infection  - high concern for viability given vasc history but bled well intraop, flaps warm and viable   - CAM boot delivered, pt weight bearing as tolerated to left foot in CAM   -Please remain nonweight bearing to the right foot in a CAM Walker boot.   - OR Wound culture growing MRSA and Proteus   - Complete 7 more days of Oral Bactrim for soft tissue coverage   - Pt seen by PT  - Outpatient follow up

## 2021-12-21 NOTE — PROGRESS NOTE ADULT - ASSESSMENT
Plan:   To OR today at 3:30pm with Dr. Hsu for right foot transmetatarsal amputation.   CXR on sunrise.  EKG on sunrise.  Medical/Cardiac clearance since 12/20 and documented in chart.  Consent signed and in chart.  Procedure was explained to patient in detail. All alternatives, risks and complications were discussed. All questions answered.

## 2021-12-21 NOTE — PRE-ANESTHESIA EVALUATION ADULT - NSANTHOSAYNRD_GEN_A_CORE
No. ARNOLD screening performed.  STOP BANG Legend: 0-2 = LOW Risk; 3-4 = INTERMEDIATE Risk; 5-8 = HIGH Risk

## 2021-12-21 NOTE — DISCHARGE NOTE PROVIDER - DETAILS OF MALNUTRITION DIAGNOSIS/DIAGNOSES
This patient has been assessed with a concern for Malnutrition and was treated during this hospitalization for the following Nutrition diagnosis/diagnoses:     -  12/22/2021: Severe protein-calorie malnutrition

## 2021-12-21 NOTE — BRIEF OPERATIVE NOTE - COMMENTS
s/p right foto transmetatarsal amputation with tendoachilles lengthening - closed, good intraop bleeding, good bone quality at the level of resection, low concern for residual infection, low concern for viability, discharge pending CAM boot/ OR wound culture ng x 2 days

## 2021-12-21 NOTE — BRIEF OPERATIVE NOTE - NSICDXBRIEFPREOP_GEN_ALL_CORE_FT
PRE-OP DIAGNOSIS:  Gangrene of right foot 21-Dec-2021 19:43:18  Jarad Her  Equinus deformity of right foot 21-Dec-2021 19:44:02  Jarad Her

## 2021-12-21 NOTE — DISCHARGE NOTE PROVIDER - CARE PROVIDER_API CALL
Shaina Araujo)  Surgery  [address], [address]  [place] [address]  Phone: [phone]  Fax: [phone]  Follow Up Time: 2 weeks   Shaina Araujo)  Surgery  [address], [address]  [place] [address]  Phone: [phone]  Fax: [phone]  Established Patient  Follow Up Time: 2 weeks    Kaya Ghotra   INTERNAL MEDICINE  [address], [place] [id number]  Phone: [phone]  Fax: [phone]  Established Patient  Follow Up Time: 1 week

## 2021-12-22 LAB
-  AMIKACIN: SIGNIFICANT CHANGE UP
-  AMOXICILLIN/CLAVULANIC ACID: SIGNIFICANT CHANGE UP
-  AMPICILLIN/SULBACTAM: SIGNIFICANT CHANGE UP
-  AMPICILLIN/SULBACTAM: SIGNIFICANT CHANGE UP
-  AMPICILLIN: SIGNIFICANT CHANGE UP
-  AZTREONAM: SIGNIFICANT CHANGE UP
-  CEFAZOLIN: SIGNIFICANT CHANGE UP
-  CEFAZOLIN: SIGNIFICANT CHANGE UP
-  CEFEPIME: SIGNIFICANT CHANGE UP
-  CEFOXITIN: SIGNIFICANT CHANGE UP
-  CEFTRIAXONE: SIGNIFICANT CHANGE UP
-  CIPROFLOXACIN: SIGNIFICANT CHANGE UP
-  CLINDAMYCIN: SIGNIFICANT CHANGE UP
-  DAPTOMYCIN: SIGNIFICANT CHANGE UP
-  ERTAPENEM: SIGNIFICANT CHANGE UP
-  ERYTHROMYCIN: SIGNIFICANT CHANGE UP
-  GENTAMICIN: SIGNIFICANT CHANGE UP
-  GENTAMICIN: SIGNIFICANT CHANGE UP
-  LEVOFLOXACIN: SIGNIFICANT CHANGE UP
-  LINEZOLID: SIGNIFICANT CHANGE UP
-  MEROPENEM: SIGNIFICANT CHANGE UP
-  OXACILLIN: SIGNIFICANT CHANGE UP
-  PENICILLIN: SIGNIFICANT CHANGE UP
-  PIPERACILLIN/TAZOBACTAM: SIGNIFICANT CHANGE UP
-  RIFAMPIN: SIGNIFICANT CHANGE UP
-  TETRACYCLINE: SIGNIFICANT CHANGE UP
-  TOBRAMYCIN: SIGNIFICANT CHANGE UP
-  TRIMETHOPRIM/SULFAMETHOXAZOLE: SIGNIFICANT CHANGE UP
-  TRIMETHOPRIM/SULFAMETHOXAZOLE: SIGNIFICANT CHANGE UP
-  VANCOMYCIN: SIGNIFICANT CHANGE UP
ANION GAP SERPL CALC-SCNC: 13 MMOL/L — SIGNIFICANT CHANGE UP (ref 5–17)
BUN SERPL-MCNC: 12 MG/DL — SIGNIFICANT CHANGE UP (ref 7–23)
CALCIUM SERPL-MCNC: 9 MG/DL — SIGNIFICANT CHANGE UP (ref 8.4–10.5)
CHLORIDE SERPL-SCNC: 102 MMOL/L — SIGNIFICANT CHANGE UP (ref 96–108)
CO2 SERPL-SCNC: 21 MMOL/L — LOW (ref 22–31)
CREAT SERPL-MCNC: 0.59 MG/DL — SIGNIFICANT CHANGE UP (ref 0.5–1.3)
GLUCOSE SERPL-MCNC: 101 MG/DL — HIGH (ref 70–99)
HCT VFR BLD CALC: 30.9 % — LOW (ref 34.5–45)
HGB BLD-MCNC: 9.5 G/DL — LOW (ref 11.5–15.5)
MCHC RBC-ENTMCNC: 27.6 PG — SIGNIFICANT CHANGE UP (ref 27–34)
MCHC RBC-ENTMCNC: 30.7 GM/DL — LOW (ref 32–36)
MCV RBC AUTO: 89.8 FL — SIGNIFICANT CHANGE UP (ref 80–100)
METHOD TYPE: SIGNIFICANT CHANGE UP
METHOD TYPE: SIGNIFICANT CHANGE UP
NRBC # BLD: 0 /100 WBCS — SIGNIFICANT CHANGE UP (ref 0–0)
PLATELET # BLD AUTO: 371 K/UL — SIGNIFICANT CHANGE UP (ref 150–400)
POTASSIUM SERPL-MCNC: 3.7 MMOL/L — SIGNIFICANT CHANGE UP (ref 3.5–5.3)
POTASSIUM SERPL-SCNC: 3.7 MMOL/L — SIGNIFICANT CHANGE UP (ref 3.5–5.3)
RBC # BLD: 3.44 M/UL — LOW (ref 3.8–5.2)
RBC # FLD: 15 % — HIGH (ref 10.3–14.5)
SODIUM SERPL-SCNC: 136 MMOL/L — SIGNIFICANT CHANGE UP (ref 135–145)
WBC # BLD: 11.62 K/UL — HIGH (ref 3.8–10.5)
WBC # FLD AUTO: 11.62 K/UL — HIGH (ref 3.8–10.5)

## 2021-12-22 RX ORDER — OXYCODONE HYDROCHLORIDE 5 MG/1
5 TABLET ORAL EVERY 8 HOURS
Refills: 0 | Status: DISCONTINUED | OUTPATIENT
Start: 2021-12-22 | End: 2021-12-27

## 2021-12-22 RX ORDER — CHLORHEXIDINE GLUCONATE 213 G/1000ML
1 SOLUTION TOPICAL
Refills: 0 | Status: DISCONTINUED | OUTPATIENT
Start: 2021-12-22 | End: 2021-12-27

## 2021-12-22 RX ORDER — HEPARIN SODIUM 5000 [USP'U]/ML
5000 INJECTION INTRAVENOUS; SUBCUTANEOUS EVERY 8 HOURS
Refills: 0 | Status: DISCONTINUED | OUTPATIENT
Start: 2021-12-22 | End: 2021-12-25

## 2021-12-22 RX ORDER — ASCORBIC ACID 60 MG
500 TABLET,CHEWABLE ORAL DAILY
Refills: 0 | Status: DISCONTINUED | OUTPATIENT
Start: 2021-12-22 | End: 2021-12-27

## 2021-12-22 RX ADMIN — Medication 650 MILLIGRAM(S): at 23:17

## 2021-12-22 RX ADMIN — PANTOPRAZOLE SODIUM 40 MILLIGRAM(S): 20 TABLET, DELAYED RELEASE ORAL at 05:29

## 2021-12-22 RX ADMIN — OXYCODONE HYDROCHLORIDE 10 MILLIGRAM(S): 5 TABLET ORAL at 05:29

## 2021-12-22 RX ADMIN — CEFEPIME 100 MILLIGRAM(S): 1 INJECTION, POWDER, FOR SOLUTION INTRAMUSCULAR; INTRAVENOUS at 23:16

## 2021-12-22 RX ADMIN — OXYCODONE HYDROCHLORIDE 10 MILLIGRAM(S): 5 TABLET ORAL at 01:09

## 2021-12-22 RX ADMIN — CEFEPIME 100 MILLIGRAM(S): 1 INJECTION, POWDER, FOR SOLUTION INTRAMUSCULAR; INTRAVENOUS at 10:52

## 2021-12-22 RX ADMIN — Medication 12.5 MILLIGRAM(S): at 17:05

## 2021-12-22 RX ADMIN — HEPARIN SODIUM 5000 UNIT(S): 5000 INJECTION INTRAVENOUS; SUBCUTANEOUS at 21:38

## 2021-12-22 RX ADMIN — OXYCODONE HYDROCHLORIDE 10 MILLIGRAM(S): 5 TABLET ORAL at 00:14

## 2021-12-22 RX ADMIN — Medication 12.5 MILLIGRAM(S): at 05:29

## 2021-12-22 RX ADMIN — OXYCODONE HYDROCHLORIDE 10 MILLIGRAM(S): 5 TABLET ORAL at 06:18

## 2021-12-22 RX ADMIN — ATORVASTATIN CALCIUM 10 MILLIGRAM(S): 80 TABLET, FILM COATED ORAL at 21:38

## 2021-12-22 RX ADMIN — Medication 500 MILLIGRAM(S): at 13:55

## 2021-12-22 RX ADMIN — Medication 650 MILLIGRAM(S): at 10:52

## 2021-12-22 RX ADMIN — Medication 650 MILLIGRAM(S): at 23:47

## 2021-12-22 RX ADMIN — HEPARIN SODIUM 5000 UNIT(S): 5000 INJECTION INTRAVENOUS; SUBCUTANEOUS at 13:55

## 2021-12-22 RX ADMIN — Medication 650 MILLIGRAM(S): at 17:05

## 2021-12-22 RX ADMIN — OXYCODONE HYDROCHLORIDE 5 MILLIGRAM(S): 5 TABLET ORAL at 11:30

## 2021-12-22 RX ADMIN — OXYCODONE HYDROCHLORIDE 5 MILLIGRAM(S): 5 TABLET ORAL at 09:37

## 2021-12-22 RX ADMIN — OXYCODONE HYDROCHLORIDE 5 MILLIGRAM(S): 5 TABLET ORAL at 13:55

## 2021-12-22 NOTE — DIETITIAN INITIAL EVALUATION ADULT. - OTHER INFO
Per daughter, pt has lost weight since LAR w/ ostomy creation in 10/2021. Per previous RD note 11/2 pt 120.1lb but this was suspected to be incorrect with reported wt ~100lb. Daughter estimates weight currently 98lb, regularly weighs pt at home. Dosing wt 125lb - ?accuracy. Bedscale weight obtained at visit (blankets/pillows present) of 104.5lb - suggests wt likely <100lb. Will continue to monitor.    Pt denies nausea, vomiting, diarrhea, or constipation; +colostomy 300ml output noted 12/21 per flow sheets. Daughter bringing in foods from home such as yogurt and banana. Food preferences obtained, RD to honor as feasible. Pt amenable to receiving Ensure while in house.    Recommendations provided to increase caloric/protein content of meals. Written materials provided to daughter - all questions answered. Daughter and pt made aware RD remains available.

## 2021-12-22 NOTE — DIETITIAN INITIAL EVALUATION ADULT. - REASON INDICATOR FOR ASSESSMENT
Consult received for pressure injury stage II or greater.  Information obtained from pt, pt's daughter, EMR.

## 2021-12-22 NOTE — PROGRESS NOTE ADULT - ASSESSMENT
s/p RF TMA w/ ROB closed (DOS 12/21)  - pt seen and evaluated  - afebrile, WBC 11.6  - s/p 12/21 RF TMA w/ ROB, closed, sutures intact, well coapted, no dehiscence, no acute SOI  - CAM boot ordered, awaiting delivery  - pod plan to d/c on PO abx pending OR WCX results and CAM boot delivery  - f/u information and instructions can be found in f/u section of the provider d/c note   - will follow   - discussed w/ attending

## 2021-12-22 NOTE — DIETITIAN INITIAL EVALUATION ADULT. - CHIEF COMPLAINT
The patient is a 81y Female complaining of pain, foot.  pt with h/o raynauds HTN angioedema and rectal ca recently s/p LAR c/b "R cold leg". She has had 2 angiograms, most recently yesterday with 3 stents placed in the pedal vasculature. Pt experiencing dry gangrenous changes of R 1st, 2nd, and 5th toes. S/p R TMA 12/21.

## 2021-12-22 NOTE — PHYSICAL THERAPY INITIAL EVALUATION ADULT - PLANNED THERAPY INTERVENTIONS, PT EVAL
Stair negotiation GOAL: Patient will negotiate up / down 1 flight of stairs with min A in 4 weeks/balance training/bed mobility training/gait training/transfer training

## 2021-12-22 NOTE — PHYSICAL THERAPY INITIAL EVALUATION ADULT - ASR WT BEARING STATUS EVAL
per Podiatry maintain NWB status until CAM boot arrives, will update WB status once CAM boot is delivered/no weight-bearing restrictions/Right LE

## 2021-12-22 NOTE — DIETITIAN NUTRITION RISK NOTIFICATION - TREATMENT: THE FOLLOWING DIET HAS BEEN RECOMMENDED
Diet, Regular:   Supplement Feeding Modality:  Oral  Ensure Enlive Cans or Servings Per Day:  2       Frequency:  Daily (12-22-21 @ 12:52) [Pending Verification By Attending]  Diet, DASH/TLC:   Sodium & Cholesterol Restricted (12-21-21 @ 17:54) [Active]

## 2021-12-22 NOTE — DIETITIAN INITIAL EVALUATION ADULT. - ORAL INTAKE PTA/DIET HISTORY
Diet recall per pt's daughter: banana & coffee; oatmeal with peaches & cottage cheese for breakfast. Yogurt for AM snack. Chicken salad w/ toast for lunch. Cake for PM snack. Pasta with meat and cheese for dinner. Pt also taking Ensure or Boost x 1 daily. Per daughter portions are small. NKFA. No chewing/swallowing difficulty reported.

## 2021-12-22 NOTE — PHYSICAL THERAPY INITIAL EVALUATION ADULT - GENERAL OBSERVATIONS, REHAB EVAL
Patient received semi reclined in bed, Right LE elevated, NAD, +Primafit, +PIV, +R LE splint, daughter present

## 2021-12-22 NOTE — PROGRESS NOTE ADULT - ASSESSMENT
80yo F with Hx Raynaud's, lupus, HTN, rectal CA (s/p LAR and diverting colostomy at [facility] earlier this year c/b post-op cold leg) and recent admission for high-grade SBO and cold R leg on 10/28/21 who presents as pre-op for podiatry amputation for R 1st and 2nd toe gangrene s/p RLE angio + stent x3 on 12/18 by Interventional Cardiology. Vascular Surgery consulted for RLE swelling. Patient now s/p R TMA with podiatry (12/21).    PLAN:   - No acute vascular surgery intervention at this time  - Outside records show patient had RLE angiogram with R PT/AT/peroneal plasty and R SFA stent placement on 12/18  - Appreciate pods recs  - KEREN/PVR results noted  - Rest of care per primary team    Vascular Surgery [id number]

## 2021-12-22 NOTE — DIETITIAN INITIAL EVALUATION ADULT. - PERTINENT MEDS FT
MEDICATIONS  (STANDING):  acetaminophen     Tablet .. 650 milliGRAM(s) Oral every 6 hours  atorvastatin 10 milliGRAM(s) Oral at bedtime  cefepime   IVPB 2000 milliGRAM(s) IV Intermittent every 12 hours  chlorhexidine 4% Liquid 1 Application(s) Topical <User Schedule>  heparin   Injectable 5000 Unit(s) SubCutaneous every 8 hours  influenza  Vaccine (HIGH DOSE) 0.7 milliLiter(s) IntraMuscular once  metoprolol tartrate 12.5 milliGRAM(s) Oral every 12 hours  oxyCODONE    IR 5 milliGRAM(s) Oral every 8 hours  pantoprazole    Tablet 40 milliGRAM(s) Oral before breakfast

## 2021-12-22 NOTE — PHYSICAL THERAPY INITIAL EVALUATION ADULT - ADDITIONAL COMMENTS
Patient lives in private home with daughter 1 steps to enter, 1 flight to bedroom/bathroom however can have first floor set up upon discharge (will have to go upstairs to shower)

## 2021-12-22 NOTE — PHYSICAL THERAPY INITIAL EVALUATION ADULT - PERTINENT HX OF CURRENT PROBLEM, REHAB EVAL
80yo F with Hx Raynaud's, lupus, HTN, rectal CA (s/p LAR and diverting colostomy at [facility] earlier this year c/b post-op cold leg) and recent admission for high-grade SBO and cold R leg on 10/28/21 who presents as pre-op for podiatry amputation for R 1st and 2nd toe gangrene by Dr. Hsu. 12/19: R foot xray: Small focus of air adjacent to the first distal phalanx  12/21: s/p Right foot TMA, R foot xray: Status post right foot TMA with sharp and smooth osseous stump margins

## 2021-12-22 NOTE — DIETITIAN INITIAL EVALUATION ADULT. - ADD RECOMMEND
Suggest multivitamin and vitamin C to support wound healing. Provide encouragement with PO intake, menu selections, and assistance with meals as needed. Continue to monitor nutritional intake, labs, weights, BM, skin, clinical course.

## 2021-12-22 NOTE — DIETITIAN INITIAL EVALUATION ADULT. - PHYSCIAL ASSESSMENT
Per flow sheets pt with stage II right heel.  Nutrition focused physical exam deferred at this time to allow pt to work with PT. Visual findings below.

## 2021-12-22 NOTE — PROGRESS NOTE ADULT - ASSESSMENT
81y Female complaining of pain, foot.  pt with h/o raynauds HTN angioedema and rectal ca recently s/p LAR c/b "R cold leg". She has had 2 angiograms, most recently yesterday with 3 stents placed in the pedal vasculature. Pt experiencing dry gangrenous changes of R 1st, 2nd, and 5th toes. sent in for admission pre-op by podiatry Dr Hsu    Gangrene of right first and second toe  - cont iv abx  - please call ID consult  - pain control with oxycodone  - s/p TM amputation  - post op care  and dressing change as per podiatry.  - oxycodone q 8 atc as per daughters request for pain    HTN  - c/w metoprolol    HTN  - c/w lipitor    saqib/PVD  - hold asa, plavix and eliquis for now and resume when ok with podiatry    Dr Castro will cover pt until 12/31

## 2021-12-23 LAB
-  AMIKACIN: SIGNIFICANT CHANGE UP
-  AMOXICILLIN/CLAVULANIC ACID: SIGNIFICANT CHANGE UP
-  AMPICILLIN/SULBACTAM: SIGNIFICANT CHANGE UP
-  AMPICILLIN/SULBACTAM: SIGNIFICANT CHANGE UP
-  AMPICILLIN: SIGNIFICANT CHANGE UP
-  AZTREONAM: SIGNIFICANT CHANGE UP
-  CEFAZOLIN: SIGNIFICANT CHANGE UP
-  CEFAZOLIN: SIGNIFICANT CHANGE UP
-  CEFEPIME: SIGNIFICANT CHANGE UP
-  CEFOXITIN: SIGNIFICANT CHANGE UP
-  CEFTRIAXONE: SIGNIFICANT CHANGE UP
-  CIPROFLOXACIN: SIGNIFICANT CHANGE UP
-  CLINDAMYCIN: SIGNIFICANT CHANGE UP
-  DAPTOMYCIN: SIGNIFICANT CHANGE UP
-  ERTAPENEM: SIGNIFICANT CHANGE UP
-  ERYTHROMYCIN: SIGNIFICANT CHANGE UP
-  GENTAMICIN: SIGNIFICANT CHANGE UP
-  GENTAMICIN: SIGNIFICANT CHANGE UP
-  LEVOFLOXACIN: SIGNIFICANT CHANGE UP
-  LINEZOLID: SIGNIFICANT CHANGE UP
-  MEROPENEM: SIGNIFICANT CHANGE UP
-  OXACILLIN: SIGNIFICANT CHANGE UP
-  PENICILLIN: SIGNIFICANT CHANGE UP
-  PIPERACILLIN/TAZOBACTAM: SIGNIFICANT CHANGE UP
-  RIFAMPIN: SIGNIFICANT CHANGE UP
-  TETRACYCLINE: SIGNIFICANT CHANGE UP
-  TOBRAMYCIN: SIGNIFICANT CHANGE UP
-  TRIMETHOPRIM/SULFAMETHOXAZOLE: SIGNIFICANT CHANGE UP
-  TRIMETHOPRIM/SULFAMETHOXAZOLE: SIGNIFICANT CHANGE UP
-  VANCOMYCIN: SIGNIFICANT CHANGE UP
ANION GAP SERPL CALC-SCNC: 14 MMOL/L — SIGNIFICANT CHANGE UP (ref 5–17)
BUN SERPL-MCNC: 16 MG/DL — SIGNIFICANT CHANGE UP (ref 7–23)
CALCIUM SERPL-MCNC: 9.5 MG/DL — SIGNIFICANT CHANGE UP (ref 8.4–10.5)
CHLORIDE SERPL-SCNC: 103 MMOL/L — SIGNIFICANT CHANGE UP (ref 96–108)
CO2 SERPL-SCNC: 20 MMOL/L — LOW (ref 22–31)
CREAT SERPL-MCNC: 0.72 MG/DL — SIGNIFICANT CHANGE UP (ref 0.5–1.3)
GLUCOSE SERPL-MCNC: 104 MG/DL — HIGH (ref 70–99)
HCT VFR BLD CALC: 27.2 % — LOW (ref 34.5–45)
HGB BLD-MCNC: 8.7 G/DL — LOW (ref 11.5–15.5)
MCHC RBC-ENTMCNC: 28.2 PG — SIGNIFICANT CHANGE UP (ref 27–34)
MCHC RBC-ENTMCNC: 32 GM/DL — SIGNIFICANT CHANGE UP (ref 32–36)
MCV RBC AUTO: 88 FL — SIGNIFICANT CHANGE UP (ref 80–100)
METHOD TYPE: SIGNIFICANT CHANGE UP
METHOD TYPE: SIGNIFICANT CHANGE UP
MRSA PCR RESULT.: DETECTED
NRBC # BLD: 0 /100 WBCS — SIGNIFICANT CHANGE UP (ref 0–0)
PLATELET # BLD AUTO: 381 K/UL — SIGNIFICANT CHANGE UP (ref 150–400)
POTASSIUM SERPL-MCNC: 3.7 MMOL/L — SIGNIFICANT CHANGE UP (ref 3.5–5.3)
POTASSIUM SERPL-SCNC: 3.7 MMOL/L — SIGNIFICANT CHANGE UP (ref 3.5–5.3)
RBC # BLD: 3.09 M/UL — LOW (ref 3.8–5.2)
RBC # FLD: 15.2 % — HIGH (ref 10.3–14.5)
S AUREUS DNA NOSE QL NAA+PROBE: DETECTED
SODIUM SERPL-SCNC: 137 MMOL/L — SIGNIFICANT CHANGE UP (ref 135–145)
WBC # BLD: 10.01 K/UL — SIGNIFICANT CHANGE UP (ref 3.8–10.5)
WBC # FLD AUTO: 10.01 K/UL — SIGNIFICANT CHANGE UP (ref 3.8–10.5)

## 2021-12-23 RX ADMIN — HEPARIN SODIUM 5000 UNIT(S): 5000 INJECTION INTRAVENOUS; SUBCUTANEOUS at 13:37

## 2021-12-23 RX ADMIN — OXYCODONE HYDROCHLORIDE 5 MILLIGRAM(S): 5 TABLET ORAL at 06:24

## 2021-12-23 RX ADMIN — Medication 650 MILLIGRAM(S): at 17:38

## 2021-12-23 RX ADMIN — CEFEPIME 100 MILLIGRAM(S): 1 INJECTION, POWDER, FOR SOLUTION INTRAMUSCULAR; INTRAVENOUS at 22:03

## 2021-12-23 RX ADMIN — Medication 12.5 MILLIGRAM(S): at 17:07

## 2021-12-23 RX ADMIN — OXYCODONE HYDROCHLORIDE 5 MILLIGRAM(S): 5 TABLET ORAL at 21:42

## 2021-12-23 RX ADMIN — Medication 650 MILLIGRAM(S): at 12:31

## 2021-12-23 RX ADMIN — Medication 1 TABLET(S): at 13:37

## 2021-12-23 RX ADMIN — OXYCODONE HYDROCHLORIDE 5 MILLIGRAM(S): 5 TABLET ORAL at 14:07

## 2021-12-23 RX ADMIN — Medication 650 MILLIGRAM(S): at 12:01

## 2021-12-23 RX ADMIN — ATORVASTATIN CALCIUM 10 MILLIGRAM(S): 80 TABLET, FILM COATED ORAL at 21:42

## 2021-12-23 RX ADMIN — CEFEPIME 100 MILLIGRAM(S): 1 INJECTION, POWDER, FOR SOLUTION INTRAMUSCULAR; INTRAVENOUS at 12:00

## 2021-12-23 RX ADMIN — HEPARIN SODIUM 5000 UNIT(S): 5000 INJECTION INTRAVENOUS; SUBCUTANEOUS at 21:42

## 2021-12-23 RX ADMIN — OXYCODONE HYDROCHLORIDE 5 MILLIGRAM(S): 5 TABLET ORAL at 13:37

## 2021-12-23 RX ADMIN — PANTOPRAZOLE SODIUM 40 MILLIGRAM(S): 20 TABLET, DELAYED RELEASE ORAL at 06:04

## 2021-12-23 RX ADMIN — Medication 500 MILLIGRAM(S): at 13:37

## 2021-12-23 RX ADMIN — Medication 650 MILLIGRAM(S): at 17:08

## 2021-12-23 RX ADMIN — Medication 650 MILLIGRAM(S): at 23:06

## 2021-12-23 RX ADMIN — CHLORHEXIDINE GLUCONATE 1 APPLICATION(S): 213 SOLUTION TOPICAL at 05:21

## 2021-12-23 RX ADMIN — OXYCODONE HYDROCHLORIDE 5 MILLIGRAM(S): 5 TABLET ORAL at 06:04

## 2021-12-23 RX ADMIN — Medication 650 MILLIGRAM(S): at 05:20

## 2021-12-23 RX ADMIN — HEPARIN SODIUM 5000 UNIT(S): 5000 INJECTION INTRAVENOUS; SUBCUTANEOUS at 05:21

## 2021-12-23 RX ADMIN — Medication 12.5 MILLIGRAM(S): at 05:21

## 2021-12-23 RX ADMIN — Medication 650 MILLIGRAM(S): at 05:50

## 2021-12-23 NOTE — PROGRESS NOTE ADULT - ASSESSMENT
cold leg". She has had 2 angiograms, most recently yesterday with 3 stents placed in the pedal vasculature. Pt experiencing dry gangrenous changes of R 1st, 2nd, and 5th toes. sent in for admission pre-op by podiatry Dr Hsu    Gangrene of right first and second toe  - cont iv abx  - please call ID consult  - pain control with oxycodone  - s/p TM amputation  - post op care  and dressing change as per podiatry.  - oxycodone q 8 atc as per daughters request for pain    HTN  - c/w metoprolol    HTN  - c/w lipitor    reynauds/PVD  - hold asa, plavix and eliquis for now and resume when ok with podiatry    case dw daughter at bedside

## 2021-12-23 NOTE — CHART NOTE - NSCHARTNOTEFT_GEN_A_CORE
Order received from podiary resident to fit patient with cam tma booot to be used OOB after surgery.  Boot sized and adjusted to patient dimensions  Daughter present and she was instructed to don and doff  Pt will need TMA filler and extra depth shioes after wound closeure  Follow up if needed      Baljinder Rodriguez [employer]  [phone]  [facility] p&o

## 2021-12-24 LAB
ANION GAP SERPL CALC-SCNC: 15 MMOL/L — SIGNIFICANT CHANGE UP (ref 5–17)
BUN SERPL-MCNC: 25 MG/DL — HIGH (ref 7–23)
CALCIUM SERPL-MCNC: 9.5 MG/DL — SIGNIFICANT CHANGE UP (ref 8.4–10.5)
CHLORIDE SERPL-SCNC: 103 MMOL/L — SIGNIFICANT CHANGE UP (ref 96–108)
CO2 SERPL-SCNC: 18 MMOL/L — LOW (ref 22–31)
CREAT SERPL-MCNC: 0.72 MG/DL — SIGNIFICANT CHANGE UP (ref 0.5–1.3)
GLUCOSE SERPL-MCNC: 78 MG/DL — SIGNIFICANT CHANGE UP (ref 70–99)
HCT VFR BLD CALC: 28.5 % — LOW (ref 34.5–45)
HGB BLD-MCNC: 8.7 G/DL — LOW (ref 11.5–15.5)
MCHC RBC-ENTMCNC: 27.9 PG — SIGNIFICANT CHANGE UP (ref 27–34)
MCHC RBC-ENTMCNC: 30.5 GM/DL — LOW (ref 32–36)
MCV RBC AUTO: 91.3 FL — SIGNIFICANT CHANGE UP (ref 80–100)
NRBC # BLD: 0 /100 WBCS — SIGNIFICANT CHANGE UP (ref 0–0)
PLATELET # BLD AUTO: 379 K/UL — SIGNIFICANT CHANGE UP (ref 150–400)
POTASSIUM SERPL-MCNC: 4.4 MMOL/L — SIGNIFICANT CHANGE UP (ref 3.5–5.3)
POTASSIUM SERPL-SCNC: 4.4 MMOL/L — SIGNIFICANT CHANGE UP (ref 3.5–5.3)
RBC # BLD: 3.12 M/UL — LOW (ref 3.8–5.2)
RBC # FLD: 15.4 % — HIGH (ref 10.3–14.5)
SODIUM SERPL-SCNC: 136 MMOL/L — SIGNIFICANT CHANGE UP (ref 135–145)
WBC # BLD: 8.11 K/UL — SIGNIFICANT CHANGE UP (ref 3.8–10.5)
WBC # FLD AUTO: 8.11 K/UL — SIGNIFICANT CHANGE UP (ref 3.8–10.5)

## 2021-12-24 PROCEDURE — 99222 1ST HOSP IP/OBS MODERATE 55: CPT | Mod: GC

## 2021-12-24 RX ORDER — VANCOMYCIN HCL 1 G
1000 VIAL (EA) INTRAVENOUS EVERY 12 HOURS
Refills: 0 | Status: DISCONTINUED | OUTPATIENT
Start: 2021-12-24 | End: 2021-12-24

## 2021-12-24 RX ORDER — VANCOMYCIN HCL 1 G
1000 VIAL (EA) INTRAVENOUS EVERY 24 HOURS
Refills: 0 | Status: DISCONTINUED | OUTPATIENT
Start: 2021-12-24 | End: 2021-12-27

## 2021-12-24 RX ADMIN — CEFEPIME 100 MILLIGRAM(S): 1 INJECTION, POWDER, FOR SOLUTION INTRAMUSCULAR; INTRAVENOUS at 23:40

## 2021-12-24 RX ADMIN — Medication 250 MILLIGRAM(S): at 17:32

## 2021-12-24 RX ADMIN — Medication 12.5 MILLIGRAM(S): at 17:32

## 2021-12-24 RX ADMIN — Medication 650 MILLIGRAM(S): at 05:07

## 2021-12-24 RX ADMIN — ATORVASTATIN CALCIUM 10 MILLIGRAM(S): 80 TABLET, FILM COATED ORAL at 21:05

## 2021-12-24 RX ADMIN — Medication 650 MILLIGRAM(S): at 23:40

## 2021-12-24 RX ADMIN — Medication 1 TABLET(S): at 13:21

## 2021-12-24 RX ADMIN — HEPARIN SODIUM 5000 UNIT(S): 5000 INJECTION INTRAVENOUS; SUBCUTANEOUS at 13:21

## 2021-12-24 RX ADMIN — Medication 650 MILLIGRAM(S): at 11:46

## 2021-12-24 RX ADMIN — OXYCODONE HYDROCHLORIDE 5 MILLIGRAM(S): 5 TABLET ORAL at 05:07

## 2021-12-24 RX ADMIN — CEFEPIME 100 MILLIGRAM(S): 1 INJECTION, POWDER, FOR SOLUTION INTRAMUSCULAR; INTRAVENOUS at 11:22

## 2021-12-24 RX ADMIN — Medication 650 MILLIGRAM(S): at 17:32

## 2021-12-24 RX ADMIN — CHLORHEXIDINE GLUCONATE 1 APPLICATION(S): 213 SOLUTION TOPICAL at 05:11

## 2021-12-24 RX ADMIN — Medication 650 MILLIGRAM(S): at 12:30

## 2021-12-24 RX ADMIN — OXYCODONE HYDROCHLORIDE 5 MILLIGRAM(S): 5 TABLET ORAL at 14:00

## 2021-12-24 RX ADMIN — OXYCODONE HYDROCHLORIDE 5 MILLIGRAM(S): 5 TABLET ORAL at 13:21

## 2021-12-24 RX ADMIN — HEPARIN SODIUM 5000 UNIT(S): 5000 INJECTION INTRAVENOUS; SUBCUTANEOUS at 21:05

## 2021-12-24 RX ADMIN — Medication 650 MILLIGRAM(S): at 18:10

## 2021-12-24 RX ADMIN — PANTOPRAZOLE SODIUM 40 MILLIGRAM(S): 20 TABLET, DELAYED RELEASE ORAL at 05:06

## 2021-12-24 RX ADMIN — Medication 500 MILLIGRAM(S): at 13:21

## 2021-12-24 RX ADMIN — Medication 12.5 MILLIGRAM(S): at 05:07

## 2021-12-24 RX ADMIN — HEPARIN SODIUM 5000 UNIT(S): 5000 INJECTION INTRAVENOUS; SUBCUTANEOUS at 05:07

## 2021-12-24 RX ADMIN — OXYCODONE HYDROCHLORIDE 5 MILLIGRAM(S): 5 TABLET ORAL at 21:06

## 2021-12-24 NOTE — PROGRESS NOTE ADULT - ASSESSMENT
82yo F s/p RF TMA w/ ROB closed (DOS 12/21)  - pt seen and evaluated  - afebrile, no leukocytosis  - s/p 12/21 RF TMA w/ ROB closed, sutures intact, well coapted, no dehiscence, mild periwound erythema distal stump  - CAM boot delivered, pt WBAT to left foot in CAM   - pod plan to d/c on PO Bactrim 10 days  - recommend IV Vanco while inpatient for MRSA coverage   - f/u information and instructions can be found in f/u section of the provider d/c note   - discussed w/ attending  82yo F s/p RF TMA w/ ROB closed (DOS 12/21)  - pt seen and evaluated  - afebrile, no leukocytosis  - s/p 12/21 RF TMA w/ ROB closed, sutures intact, well coapted, no dehiscence, mild periwound erythema distal stump  - CAM boot delivered, pt WBAT to left foot in CAM   - OR Wound culture growing MRSA and Proteus   - pod plan to d/c on PO Bactrim 10 days for soft tissue coverage   - recommend add IV Vanco while inpatient for MRSA coverage   - f/u information and instructions can be found in f/u section of the provider d/c note   - discussed w/ attending  80yo F s/p RF TMA w/ ROB closed (DOS 12/21)  - pt seen and evaluated  - afebrile, no leukocytosis  - s/p 12/21 RF TMA w/ ROB closed, sutures intact, well coapted, no dehiscence, mild periwound erythema distal stump  - CAM boot delivered, pt WBAT to left foot in CAM   - OR Wound culture growing MRSA and Proteus   - pod plan to d/c on PO Bactrim 10 days for soft tissue coverage   - stable for discharge from pod standpoint   - f/u information and instructions can be found in f/u section of the provider d/c note   - discussed w/ attending  80yo F s/p RF TMA w/ ROB closed (DOS 12/21)  - pt seen and evaluated  - afebrile, no leukocytosis  - s/p 12/21 RF TMA w/ ROB closed, sutures intact, well coapted, no dehiscence, mild periwound erythema distal stump  - low concern for bone infection  - high concern for viability given vasc history but bled well intraop, flaps warm and viable   - CAM boot delivered, pt WBAT to left foot in CAM   - OR Wound culture growing MRSA and Proteus   - Recommend ID consult   - pod plan to d/c on PO Bactrim 10 days for soft tissue coverage   - stable for discharge from pod standpoint pending final ID recommendations   - f/u information and instructions can be found in f/u section of the provider d/c note   - discussed w/ attending

## 2021-12-24 NOTE — CONSULT NOTE ADULT - SUBJECTIVE AND OBJECTIVE BOX
HPI:  81 f with raynauds HTN angioedema and rectal ca recently s/p LAR c/b "R cold leg". She has had 2 angiograms, but still with  dry gangrenous changes of R 1st, 2nd, and 5th toes, now s/p TMA  initial wound cx and OR dirty soft tissue/bone cx with MRSA and proreus mirabilis (pan sensitive)  afebrile, normal WBC  has been on cefepime since 12/19 but only one day of vanco      PAST MEDICAL & SURGICAL HISTORY:  Hypertension    Osteoporosis    Varicose vein of leg    Raynaud disease    History of left inguinal hernia    Rectal cancer    Inguinal hernia    History of low anterior resection of rectum  diverting colostomy 10/2021 for rectal cancer        Allergies    ACE inhibitors (Angioedema)  amoxicillin (Angioedema)  irbesartan (Angioedema)  Norvasc (Angioedema)    Intolerances        ANTIMICROBIALS:  cefepime   IVPB 2000 every 12 hours  vancomycin  IVPB 1000 every 12 hours      OTHER MEDS:  acetaminophen     Tablet .. 650 milliGRAM(s) Oral every 6 hours  ascorbic acid 500 milliGRAM(s) Oral daily  atorvastatin 10 milliGRAM(s) Oral at bedtime  chlorhexidine 4% Liquid 1 Application(s) Topical <User Schedule>  heparin   Injectable 5000 Unit(s) SubCutaneous every 8 hours  influenza  Vaccine (HIGH DOSE) 0.7 milliLiter(s) IntraMuscular once  metoprolol tartrate 12.5 milliGRAM(s) Oral every 12 hours  multivitamin 1 Tablet(s) Oral daily  oxyCODONE    IR 5 milliGRAM(s) Oral every 8 hours  pantoprazole    Tablet 40 milliGRAM(s) Oral before breakfast      SOCIAL HISTORY:  From [place]  no smoking, alcohol or drug abuse  no recent travel    FAMILY HISTORY:  Family history of stroke (Mother)  Mother    Family history of acute myocardial infarction (Father)        ROS:    All other systems negative     Constitutional: no fever, no chills, no weight loss, no night sweats  Eye: no eye pain, no redness, no vision changes  ENT:  no sore throat, no rhinorrhea  Cardiovascular:  no chest pain, no palpitation  Respiratory:  no SOB, no cough  GI:  no abd pain, no vomiting, no diarrhea  urinary: no dysuria, no hematuria, no flank pain  : no vaginal discharge or bleeding  musculoskeletal:  no joint pain, no joint swelling  skin:  no rash  neurology:  no headache, no seizure, no change in mental status  psych: no anxiety, no depression     Physical Exam:    General:    NAD, non toxic  Head: atraumatic, normocephalic  Eyes: normal sclera and conjunctiva  ENT:   no oropharyngeal lesions, no LAD, neck supple  Cardio:    regular S1,S2  Respiratory:   clear b/l, no wheezing  abd:   soft, BS +, not tender  :     no CVAT, no suprapubic tenderness, no roberto  Musculoskeletal : R TMA with dressing  Skin:    no rash  vascular: no phlebitis  Neurologic:     no focal deficits  psych: normal affect      Drug Dosing Weight  Height (cm): 157.5 (21 Dec 2021 14:08)  Weight (kg): 56.7 (21 Dec 2021 14:08)  BMI (kg/m2): 22.9 (21 Dec 2021 14:08)  BSA (m2): 1.57 (21 Dec 2021 14:08)    Vital Signs Last 24 Hrs  T(F): 98.1 (12-24-21 @ 13:11), Max: 99.4 (12-21-21 @ 04:59)    Vital Signs Last 24 Hrs  HR: 97 (12-24-21 @ 13:11) (93 - 106)  BP: 111/66 (12-24-21 @ 13:11) (101/61 - 120/73)  RR: 18 (12-24-21 @ 13:11)  SpO2: 100% (12-24-21 @ 13:11) (98% - 100%)  Wt(kg): --                          8.7    8.11  )-----------( 379      ( 24 Dec 2021 07:41 )             28.5       12-24    136  |  103  |  25<H>  ----------------------------<  78  4.4   |  18<L>  |  0.72    Ca    9.5      24 Dec 2021 07:41            MICROBIOLOGY:  v  .Surgical Swab right foot  12-22-21   Rare Methicillin Resistant Staphylococcus aureus  Rare Proteus mirabilis  --  Methicillin resistant Staphylococcus aureus  Proteus mirabilis      .Abscess R foot  12-20-21   Numerous Methicillin Resistant Staphylococcus aureus  Rare Proteus mirabilis  --  Methicillin resistant Staphylococcus aureus  Proteus mirabilis                  RADIOLOGY:    Images independently visualized and reviewed personally,  findings as below  < from: Xray Foot AP + Lateral + Oblique, Right (12.21.21 @ 18:36) >  IMPRESSION:  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    Evenly spaced focal small soft tissue lucencies over distal Achilles   region on lateral view related to concurrently performed tendo Achilles   lengthening procedure. Otherwise no proximally tracking gas collections   beyond the amputation margins and no focal areas of osteomyelitis.    Splint material supports plantar and posterior surfaces of the extremity.    Correlate with intraoperative findings.    < end of copied text >

## 2021-12-24 NOTE — CONSULT NOTE ADULT - ASSESSMENT
81 f with raynauds HTN angioedema and rectal ca recently s/p LAR c/b "R cold leg". She has had 2 angiograms, but still with  dry gangrenous changes of R 1st, 2nd, and 5th toes, now s/p TMA  initial wound cx and OR dirty soft tissue/bone cx with MRSA and proreus mirabilis (pan sensitive)  afebrile, normal WBC  has been on cefepime since 12/19 but only one day of vanco    toe gangrene now s/p TMA, low concern for residual bone infection  initial  wound cx and OR dirty soft tissue/bone cx with MRSA and proreus mirabilis (pan sensitive)    * c/w cefepime and add vanco 1 qd while in the hospital  * f/u the path  * if no concern for residual bone infection, can switch to PO bactrim DS bid for a 7 day course  * monitor the CBC/diff and renal function  * f/u with podiatry    The above assessment and plan was discussed with the primary team    Adia Gonzalez MD  Pager [phone]  After 5pm and on weekends call [phone]

## 2021-12-24 NOTE — PROGRESS NOTE ADULT - ASSESSMENT
Gangrene of right first and second toe  - cont iv abx  - please call ID consult  - pain control with oxycodone  - s/p TM amputation  - post op care  and dressing change as per podiatry.  - oxycodone q 8 atc as per daughters request for pain    HTN  - c/w metoprolol    HTN  - c/w lipitor    jonnas/PVD  - hold asa, plavix and eliquis for now and resume when ok with podiatry   He needs to do the testing so we can have results

## 2021-12-25 LAB
ANION GAP SERPL CALC-SCNC: 13 MMOL/L — SIGNIFICANT CHANGE UP (ref 5–17)
BUN SERPL-MCNC: 30 MG/DL — HIGH (ref 7–23)
CALCIUM SERPL-MCNC: 9.8 MG/DL — SIGNIFICANT CHANGE UP (ref 8.4–10.5)
CHLORIDE SERPL-SCNC: 105 MMOL/L — SIGNIFICANT CHANGE UP (ref 96–108)
CO2 SERPL-SCNC: 19 MMOL/L — LOW (ref 22–31)
CREAT SERPL-MCNC: 0.69 MG/DL — SIGNIFICANT CHANGE UP (ref 0.5–1.3)
CULTURE RESULTS: SIGNIFICANT CHANGE UP
GLUCOSE SERPL-MCNC: 80 MG/DL — SIGNIFICANT CHANGE UP (ref 70–99)
HCT VFR BLD CALC: 29.3 % — LOW (ref 34.5–45)
HGB BLD-MCNC: 8.9 G/DL — LOW (ref 11.5–15.5)
MCHC RBC-ENTMCNC: 26.7 PG — LOW (ref 27–34)
MCHC RBC-ENTMCNC: 30.4 GM/DL — LOW (ref 32–36)
MCV RBC AUTO: 88 FL — SIGNIFICANT CHANGE UP (ref 80–100)
NRBC # BLD: 0 /100 WBCS — SIGNIFICANT CHANGE UP (ref 0–0)
ORGANISM # SPEC MICROSCOPIC CNT: SIGNIFICANT CHANGE UP
PLATELET # BLD AUTO: 421 K/UL — HIGH (ref 150–400)
POTASSIUM SERPL-MCNC: 4.2 MMOL/L — SIGNIFICANT CHANGE UP (ref 3.5–5.3)
POTASSIUM SERPL-SCNC: 4.2 MMOL/L — SIGNIFICANT CHANGE UP (ref 3.5–5.3)
RBC # BLD: 3.33 M/UL — LOW (ref 3.8–5.2)
RBC # FLD: 15.2 % — HIGH (ref 10.3–14.5)
SODIUM SERPL-SCNC: 137 MMOL/L — SIGNIFICANT CHANGE UP (ref 135–145)
SPECIMEN SOURCE: SIGNIFICANT CHANGE UP
WBC # BLD: 8.39 K/UL — SIGNIFICANT CHANGE UP (ref 3.8–10.5)
WBC # FLD AUTO: 8.39 K/UL — SIGNIFICANT CHANGE UP (ref 3.8–10.5)

## 2021-12-25 PROCEDURE — 99232 SBSQ HOSP IP/OBS MODERATE 35: CPT

## 2021-12-25 RX ORDER — APIXABAN 2.5 MG/1
2.5 TABLET, FILM COATED ORAL EVERY 12 HOURS
Refills: 0 | Status: DISCONTINUED | OUTPATIENT
Start: 2021-12-25 | End: 2021-12-27

## 2021-12-25 RX ORDER — ASPIRIN/CALCIUM CARB/MAGNESIUM 324 MG
81 TABLET ORAL DAILY
Refills: 0 | Status: DISCONTINUED | OUTPATIENT
Start: 2021-12-25 | End: 2021-12-27

## 2021-12-25 RX ADMIN — CHLORHEXIDINE GLUCONATE 1 APPLICATION(S): 213 SOLUTION TOPICAL at 05:27

## 2021-12-25 RX ADMIN — OXYCODONE HYDROCHLORIDE 5 MILLIGRAM(S): 5 TABLET ORAL at 15:03

## 2021-12-25 RX ADMIN — ATORVASTATIN CALCIUM 10 MILLIGRAM(S): 80 TABLET, FILM COATED ORAL at 21:25

## 2021-12-25 RX ADMIN — APIXABAN 2.5 MILLIGRAM(S): 2.5 TABLET, FILM COATED ORAL at 11:41

## 2021-12-25 RX ADMIN — Medication 650 MILLIGRAM(S): at 11:40

## 2021-12-25 RX ADMIN — HEPARIN SODIUM 5000 UNIT(S): 5000 INJECTION INTRAVENOUS; SUBCUTANEOUS at 05:27

## 2021-12-25 RX ADMIN — Medication 1 TABLET(S): at 11:39

## 2021-12-25 RX ADMIN — Medication 650 MILLIGRAM(S): at 05:27

## 2021-12-25 RX ADMIN — Medication 650 MILLIGRAM(S): at 17:25

## 2021-12-25 RX ADMIN — APIXABAN 2.5 MILLIGRAM(S): 2.5 TABLET, FILM COATED ORAL at 21:28

## 2021-12-25 RX ADMIN — CEFEPIME 100 MILLIGRAM(S): 1 INJECTION, POWDER, FOR SOLUTION INTRAMUSCULAR; INTRAVENOUS at 23:09

## 2021-12-25 RX ADMIN — OXYCODONE HYDROCHLORIDE 5 MILLIGRAM(S): 5 TABLET ORAL at 13:32

## 2021-12-25 RX ADMIN — CEFEPIME 100 MILLIGRAM(S): 1 INJECTION, POWDER, FOR SOLUTION INTRAMUSCULAR; INTRAVENOUS at 13:13

## 2021-12-25 RX ADMIN — Medication 81 MILLIGRAM(S): at 11:40

## 2021-12-25 RX ADMIN — Medication 250 MILLIGRAM(S): at 16:08

## 2021-12-25 RX ADMIN — OXYCODONE HYDROCHLORIDE 5 MILLIGRAM(S): 5 TABLET ORAL at 05:28

## 2021-12-25 RX ADMIN — OXYCODONE HYDROCHLORIDE 5 MILLIGRAM(S): 5 TABLET ORAL at 21:25

## 2021-12-25 RX ADMIN — Medication 650 MILLIGRAM(S): at 13:05

## 2021-12-25 RX ADMIN — PANTOPRAZOLE SODIUM 40 MILLIGRAM(S): 20 TABLET, DELAYED RELEASE ORAL at 07:36

## 2021-12-25 RX ADMIN — Medication 500 MILLIGRAM(S): at 11:39

## 2021-12-25 RX ADMIN — Medication 12.5 MILLIGRAM(S): at 17:25

## 2021-12-25 RX ADMIN — Medication 650 MILLIGRAM(S): at 23:08

## 2021-12-25 NOTE — PROGRESS NOTE ADULT - ASSESSMENT
81 f with raynauds HTN angioedema and rectal ca recently s/p LAR c/b "R cold leg". She has had 2 angiograms, but still with  dry gangrenous changes of R 1st, 2nd, and 5th toes, now s/p TMA  initial wound cx and OR dirty soft tissue/bone cx with MRSA and proreus mirabilis (pan sensitive)  afebrile, normal WBC  has been on cefepime since 12/19 but only one day of vanco    toe gangrene now s/p TMA 12/21, low concern for residual bone infection  initial  wound cx and OR dirty soft tissue/bone cx with MRSA and proreus mirabilis (pan sensitive)    * c/w cefepime and  vanco 1 qd while in the hospital  * f/u the path  * if no concern for residual bone infection, can switch to PO bactrim DS bid for a 7 day course  * monitor the CBC/diff and renal function  * f/u with podiatry    The above assessment and plan was discussed with the primary team    Adia Gonzalez MD  Pager [phone]  After 5pm and on weekends call [phone]

## 2021-12-25 NOTE — PROGRESS NOTE ADULT - ASSESSMENT
Gangrene of right first and second toe  -  antibiotics as per ID   -  ID consult fu appreciated  - pain control with oxycodone  - s/p TM amputation  - post op care  and dressing change as per podiatry.  - oxycodone q 8 atc as per daughters request for pain    HTN  - c/w metoprolol    HTN  - c/w lipitor    saqib/PVD  - restart  asa, plavix and eliquis     dc planning

## 2021-12-26 LAB
ANION GAP SERPL CALC-SCNC: 11 MMOL/L — SIGNIFICANT CHANGE UP (ref 5–17)
BUN SERPL-MCNC: 29 MG/DL — HIGH (ref 7–23)
CALCIUM SERPL-MCNC: 9.9 MG/DL — SIGNIFICANT CHANGE UP (ref 8.4–10.5)
CHLORIDE SERPL-SCNC: 102 MMOL/L — SIGNIFICANT CHANGE UP (ref 96–108)
CO2 SERPL-SCNC: 20 MMOL/L — LOW (ref 22–31)
CREAT SERPL-MCNC: 0.65 MG/DL — SIGNIFICANT CHANGE UP (ref 0.5–1.3)
CULTURE RESULTS: SIGNIFICANT CHANGE UP
GLUCOSE SERPL-MCNC: 94 MG/DL — SIGNIFICANT CHANGE UP (ref 70–99)
HCT VFR BLD CALC: 28.1 % — LOW (ref 34.5–45)
HGB BLD-MCNC: 8.7 G/DL — LOW (ref 11.5–15.5)
MCHC RBC-ENTMCNC: 27.4 PG — SIGNIFICANT CHANGE UP (ref 27–34)
MCHC RBC-ENTMCNC: 31 GM/DL — LOW (ref 32–36)
MCV RBC AUTO: 88.6 FL — SIGNIFICANT CHANGE UP (ref 80–100)
NRBC # BLD: 0 /100 WBCS — SIGNIFICANT CHANGE UP (ref 0–0)
ORGANISM # SPEC MICROSCOPIC CNT: SIGNIFICANT CHANGE UP
PLATELET # BLD AUTO: 445 K/UL — HIGH (ref 150–400)
POTASSIUM SERPL-MCNC: 4.1 MMOL/L — SIGNIFICANT CHANGE UP (ref 3.5–5.3)
POTASSIUM SERPL-SCNC: 4.1 MMOL/L — SIGNIFICANT CHANGE UP (ref 3.5–5.3)
RBC # BLD: 3.17 M/UL — LOW (ref 3.8–5.2)
RBC # FLD: 15.4 % — HIGH (ref 10.3–14.5)
SARS-COV-2 RNA SPEC QL NAA+PROBE: SIGNIFICANT CHANGE UP
SODIUM SERPL-SCNC: 133 MMOL/L — LOW (ref 135–145)
SPECIMEN SOURCE: SIGNIFICANT CHANGE UP
WBC # BLD: 7.59 K/UL — SIGNIFICANT CHANGE UP (ref 3.8–10.5)
WBC # FLD AUTO: 7.59 K/UL — SIGNIFICANT CHANGE UP (ref 3.8–10.5)

## 2021-12-26 RX ADMIN — Medication 650 MILLIGRAM(S): at 18:00

## 2021-12-26 RX ADMIN — OXYCODONE HYDROCHLORIDE 5 MILLIGRAM(S): 5 TABLET ORAL at 05:46

## 2021-12-26 RX ADMIN — Medication 250 MILLIGRAM(S): at 16:35

## 2021-12-26 RX ADMIN — OXYCODONE HYDROCHLORIDE 5 MILLIGRAM(S): 5 TABLET ORAL at 21:41

## 2021-12-26 RX ADMIN — APIXABAN 2.5 MILLIGRAM(S): 2.5 TABLET, FILM COATED ORAL at 05:46

## 2021-12-26 RX ADMIN — Medication 500 MILLIGRAM(S): at 12:00

## 2021-12-26 RX ADMIN — Medication 650 MILLIGRAM(S): at 12:00

## 2021-12-26 RX ADMIN — Medication 1 TABLET(S): at 12:00

## 2021-12-26 RX ADMIN — Medication 650 MILLIGRAM(S): at 17:29

## 2021-12-26 RX ADMIN — Medication 650 MILLIGRAM(S): at 23:05

## 2021-12-26 RX ADMIN — CHLORHEXIDINE GLUCONATE 1 APPLICATION(S): 213 SOLUTION TOPICAL at 05:46

## 2021-12-26 RX ADMIN — Medication 650 MILLIGRAM(S): at 12:20

## 2021-12-26 RX ADMIN — OXYCODONE HYDROCHLORIDE 5 MILLIGRAM(S): 5 TABLET ORAL at 13:47

## 2021-12-26 RX ADMIN — PANTOPRAZOLE SODIUM 40 MILLIGRAM(S): 20 TABLET, DELAYED RELEASE ORAL at 05:47

## 2021-12-26 RX ADMIN — CEFEPIME 100 MILLIGRAM(S): 1 INJECTION, POWDER, FOR SOLUTION INTRAMUSCULAR; INTRAVENOUS at 22:46

## 2021-12-26 RX ADMIN — Medication 12.5 MILLIGRAM(S): at 17:28

## 2021-12-26 RX ADMIN — Medication 81 MILLIGRAM(S): at 11:59

## 2021-12-26 RX ADMIN — CEFEPIME 100 MILLIGRAM(S): 1 INJECTION, POWDER, FOR SOLUTION INTRAMUSCULAR; INTRAVENOUS at 10:42

## 2021-12-26 RX ADMIN — Medication 12.5 MILLIGRAM(S): at 05:49

## 2021-12-26 RX ADMIN — APIXABAN 2.5 MILLIGRAM(S): 2.5 TABLET, FILM COATED ORAL at 17:29

## 2021-12-26 RX ADMIN — OXYCODONE HYDROCHLORIDE 5 MILLIGRAM(S): 5 TABLET ORAL at 14:10

## 2021-12-26 RX ADMIN — ATORVASTATIN CALCIUM 10 MILLIGRAM(S): 80 TABLET, FILM COATED ORAL at 21:41

## 2021-12-26 RX ADMIN — Medication 650 MILLIGRAM(S): at 05:46

## 2021-12-27 ENCOUNTER — TRANSCRIPTION ENCOUNTER (OUTPATIENT)
Age: 81
End: 2021-12-27

## 2021-12-27 VITALS
DIASTOLIC BLOOD PRESSURE: 64 MMHG | OXYGEN SATURATION: 100 % | HEART RATE: 85 BPM | SYSTOLIC BLOOD PRESSURE: 111 MMHG | TEMPERATURE: 98 F | RESPIRATION RATE: 18 BRPM

## 2021-12-27 PROCEDURE — 99285 EMERGENCY DEPT VISIT HI MDM: CPT

## 2021-12-27 PROCEDURE — 99232 SBSQ HOSP IP/OBS MODERATE 35: CPT

## 2021-12-27 PROCEDURE — 85025 COMPLETE CBC W/AUTO DIFF WBC: CPT

## 2021-12-27 PROCEDURE — 97530 THERAPEUTIC ACTIVITIES: CPT

## 2021-12-27 PROCEDURE — 97116 GAIT TRAINING THERAPY: CPT

## 2021-12-27 PROCEDURE — 87075 CULTR BACTERIA EXCEPT BLOOD: CPT

## 2021-12-27 PROCEDURE — 71045 X-RAY EXAM CHEST 1 VIEW: CPT

## 2021-12-27 PROCEDURE — 86850 RBC ANTIBODY SCREEN: CPT

## 2021-12-27 PROCEDURE — 85610 PROTHROMBIN TIME: CPT

## 2021-12-27 PROCEDURE — 88304 TISSUE EXAM BY PATHOLOGIST: CPT

## 2021-12-27 PROCEDURE — 96375 TX/PRO/DX INJ NEW DRUG ADDON: CPT

## 2021-12-27 PROCEDURE — 87186 SC STD MICRODIL/AGAR DIL: CPT

## 2021-12-27 PROCEDURE — 97161 PT EVAL LOW COMPLEX 20 MIN: CPT

## 2021-12-27 PROCEDURE — 85730 THROMBOPLASTIN TIME PARTIAL: CPT

## 2021-12-27 PROCEDURE — 87641 MR-STAPH DNA AMP PROBE: CPT

## 2021-12-27 PROCEDURE — 93923 UPR/LXTR ART STDY 3+ LVLS: CPT

## 2021-12-27 PROCEDURE — 73630 X-RAY EXAM OF FOOT: CPT

## 2021-12-27 PROCEDURE — 86900 BLOOD TYPING SEROLOGIC ABO: CPT

## 2021-12-27 PROCEDURE — 36415 COLL VENOUS BLD VENIPUNCTURE: CPT

## 2021-12-27 PROCEDURE — 87640 STAPH A DNA AMP PROBE: CPT

## 2021-12-27 PROCEDURE — U0003: CPT

## 2021-12-27 PROCEDURE — 87205 SMEAR GRAM STAIN: CPT

## 2021-12-27 PROCEDURE — 85027 COMPLETE CBC AUTOMATED: CPT

## 2021-12-27 PROCEDURE — 86901 BLOOD TYPING SEROLOGIC RH(D): CPT

## 2021-12-27 PROCEDURE — 80048 BASIC METABOLIC PNL TOTAL CA: CPT

## 2021-12-27 PROCEDURE — 80053 COMPREHEN METABOLIC PANEL: CPT

## 2021-12-27 PROCEDURE — U0005: CPT

## 2021-12-27 PROCEDURE — 96374 THER/PROPH/DIAG INJ IV PUSH: CPT

## 2021-12-27 PROCEDURE — 82962 GLUCOSE BLOOD TEST: CPT

## 2021-12-27 PROCEDURE — 93922 UPR/L XTREMITY ART 2 LEVELS: CPT

## 2021-12-27 PROCEDURE — 87077 CULTURE AEROBIC IDENTIFY: CPT

## 2021-12-27 PROCEDURE — 87070 CULTURE OTHR SPECIMN AEROBIC: CPT

## 2021-12-27 RX ORDER — AZTREONAM 2 G
1 VIAL (EA) INJECTION
Qty: 14 | Refills: 0
Start: 2021-12-27 | End: 2022-01-02

## 2021-12-27 RX ORDER — ATORVASTATIN CALCIUM 80 MG/1
1 TABLET, FILM COATED ORAL
Qty: 0 | Refills: 0 | DISCHARGE

## 2021-12-27 RX ORDER — PANTOPRAZOLE SODIUM 20 MG/1
1 TABLET, DELAYED RELEASE ORAL
Qty: 0 | Refills: 0 | DISCHARGE
Start: 2021-12-27

## 2021-12-27 RX ORDER — ASCORBIC ACID 60 MG
1 TABLET,CHEWABLE ORAL
Qty: 30 | Refills: 0
Start: 2021-12-27 | End: 2022-01-25

## 2021-12-27 RX ADMIN — Medication 650 MILLIGRAM(S): at 12:30

## 2021-12-27 RX ADMIN — PANTOPRAZOLE SODIUM 40 MILLIGRAM(S): 20 TABLET, DELAYED RELEASE ORAL at 06:29

## 2021-12-27 RX ADMIN — Medication 81 MILLIGRAM(S): at 12:29

## 2021-12-27 RX ADMIN — Medication 1 TABLET(S): at 12:30

## 2021-12-27 RX ADMIN — OXYCODONE HYDROCHLORIDE 5 MILLIGRAM(S): 5 TABLET ORAL at 06:29

## 2021-12-27 RX ADMIN — Medication 12.5 MILLIGRAM(S): at 12:29

## 2021-12-27 RX ADMIN — APIXABAN 2.5 MILLIGRAM(S): 2.5 TABLET, FILM COATED ORAL at 06:30

## 2021-12-27 RX ADMIN — CEFEPIME 100 MILLIGRAM(S): 1 INJECTION, POWDER, FOR SOLUTION INTRAMUSCULAR; INTRAVENOUS at 10:56

## 2021-12-27 RX ADMIN — OXYCODONE HYDROCHLORIDE 5 MILLIGRAM(S): 5 TABLET ORAL at 14:17

## 2021-12-27 RX ADMIN — APIXABAN 2.5 MILLIGRAM(S): 2.5 TABLET, FILM COATED ORAL at 17:53

## 2021-12-27 RX ADMIN — Medication 650 MILLIGRAM(S): at 17:53

## 2021-12-27 RX ADMIN — Medication 12.5 MILLIGRAM(S): at 17:53

## 2021-12-27 RX ADMIN — Medication 650 MILLIGRAM(S): at 06:29

## 2021-12-27 RX ADMIN — OXYCODONE HYDROCHLORIDE 5 MILLIGRAM(S): 5 TABLET ORAL at 14:38

## 2021-12-27 RX ADMIN — Medication 500 MILLIGRAM(S): at 12:30

## 2021-12-27 RX ADMIN — Medication 650 MILLIGRAM(S): at 13:00

## 2021-12-27 RX ADMIN — CHLORHEXIDINE GLUCONATE 1 APPLICATION(S): 213 SOLUTION TOPICAL at 06:30

## 2021-12-27 RX ADMIN — Medication 650 MILLIGRAM(S): at 18:23

## 2021-12-27 NOTE — PROGRESS NOTE ADULT - PROVIDER SPECIALTY LIST ADULT
Hospitalist
Podiatry
Infectious Disease
Hospitalist
Internal Medicine
Podiatry
Vascular Surgery
Vascular Surgery
Hospitalist
Infectious Disease
Internal Medicine

## 2021-12-27 NOTE — DISCHARGE NOTE NURSING/CASE MANAGEMENT/SOCIAL WORK - NSDCPEFALRISK_GEN_ALL_CORE
For information on Fall & Injury Prevention, visit: [email or web address] OR  [email or web address] OR  https://www.cdc.gov/steadi/patient.html

## 2021-12-27 NOTE — PROGRESS NOTE ADULT - SUBJECTIVE AND OBJECTIVE BOX
DATE OF SERVICE: 12-21-21 @ 16:06    Patient is a 81y old  Female who presents with a chief complaint of right foot pain (21 Dec 2021 11:20)      SUBJECTIVE / OVERNIGHT EVENTS:  No chest pain. No shortness of breath. No complaints. No events overnight. spoke with daughter at bedside    MEDICATIONS  (STANDING):  acetaminophen     Tablet .. 650 milliGRAM(s) Oral every 6 hours  atorvastatin 10 milliGRAM(s) Oral at bedtime  cefepime   IVPB 2000 milliGRAM(s) IV Intermittent every 12 hours  influenza  Vaccine (HIGH DOSE) 0.7 milliLiter(s) IntraMuscular once  metoprolol tartrate 12.5 milliGRAM(s) Oral every 12 hours  pantoprazole    Tablet 40 milliGRAM(s) Oral before breakfast  vancomycin  IVPB 1000 milliGRAM(s) IV Intermittent every 12 hours    MEDICATIONS  (PRN):  oxyCODONE    IR 5 milliGRAM(s) Oral every 4 hours PRN Moderate Pain (4 - 6)  oxyCODONE    IR 10 milliGRAM(s) Oral every 4 hours PRN Severe Pain (7 - 10)      Vital Signs Last 24 Hrs  T(C): 36.6 (21 Dec 2021 15:20), Max: 37.4 (21 Dec 2021 04:59)  T(F): 97.9 (21 Dec 2021 15:20), Max: 99.4 (21 Dec 2021 04:59)  HR: 74 (21 Dec 2021 15:20) (71 - 110)  BP: 110/70 (21 Dec 2021 15:20) (94/58 - 128/73)  BP(mean): --  RR: 16 (21 Dec 2021 15:20) (16 - 19)  SpO2: 98% (21 Dec 2021 15:20) (95% - 99%)  CAPILLARY BLOOD GLUCOSE        I&O's Summary    20 Dec 2021 07:01  -  21 Dec 2021 07:00  --------------------------------------------------------  IN: 540 mL / OUT: 200 mL / NET: 340 mL    21 Dec 2021 07:01  -  21 Dec 2021 16:06  --------------------------------------------------------  IN: 0 mL / OUT: 150 mL / NET: -150 mL        PHYSICAL EXAM:  GENERAL: NAD, well-developed  HEAD:  Atraumatic, Normocephalic  EYES: EOMI, PERRLA, conjunctiva and sclera clear  NECK: Supple, No JVD  CHEST/LUNG: Clear to auscultation bilaterally; No wheeze  HEART: Regular rate and rhythm; No murmurs, rubs, or gallops  ABDOMEN: Soft, Nontender, Nondistended; Bowel sounds present  EXTREMITIES:  2+ Peripheral Pulses, No clubbing, cyanosis, or edema  PSYCH: AAOx3  NEUROLOGY: non-focal  SKIN: No rashes or lesions    LABS:                        9.6    8.15  )-----------( 357      ( 21 Dec 2021 07:31 )             29.6     12-21    133<L>  |  102  |  17  ----------------------------<  103<H>  3.8   |  20<L>  |  0.61    Ca    9.5      21 Dec 2021 07:28    TPro  6.9  /  Alb  3.1<L>  /  TBili  0.1<L>  /  DBili  x   /  AST  21  /  ALT  12  /  AlkPhos  179<H>  12-19    PT/INR - ( 21 Dec 2021 07:31 )   PT: 12.8 sec;   INR: 1.07 ratio         PTT - ( 21 Dec 2021 07:31 )  PTT:26.5 sec          RADIOLOGY & ADDITIONAL TESTS:    Imaging Personally Reviewed:    Consultant(s) Notes Reviewed:      Care Discussed with Consultants/Other Providers:  
Patient is a 81y old  Female who presents with a chief complaint of right foot pain (24 Dec 2021 16:38)    Date of servie : 12-25-21 @ 12:14  INTERVAL HPI/OVERNIGHT EVENTS:  T(C): 36.6 (12-25-21 @ 09:31), Max: 36.9 (12-25-21 @ 05:30)  HR: 98 (12-25-21 @ 09:31) (89 - 98)  BP: 103/69 (12-25-21 @ 09:31) (103/69 - 111/66)  RR: 18 (12-25-21 @ 09:31) (18 - 18)  SpO2: 98% (12-25-21 @ 09:31) (96% - 100%)  Wt(kg): --  I&O's Summary    24 Dec 2021 07:01  -  25 Dec 2021 07:00  --------------------------------------------------------  IN: 1020 mL / OUT: 1950 mL / NET: -930 mL    25 Dec 2021 07:01  -  25 Dec 2021 12:14  --------------------------------------------------------  IN: 240 mL / OUT: 400 mL / NET: -160 mL        LABS:                        8.9    8.39  )-----------( 421      ( 25 Dec 2021 07:02 )             29.3     12-25    137  |  105  |  30<H>  ----------------------------<  80  4.2   |  19<L>  |  0.69    Ca    9.8      25 Dec 2021 07:02          CAPILLARY BLOOD GLUCOSE                MEDICATIONS  (STANDING):  acetaminophen     Tablet .. 650 milliGRAM(s) Oral every 6 hours  apixaban 2.5 milliGRAM(s) Oral every 12 hours  ascorbic acid 500 milliGRAM(s) Oral daily  aspirin  chewable 81 milliGRAM(s) Oral daily  atorvastatin 10 milliGRAM(s) Oral at bedtime  cefepime   IVPB 2000 milliGRAM(s) IV Intermittent every 12 hours  chlorhexidine 4% Liquid 1 Application(s) Topical <User Schedule>  influenza  Vaccine (HIGH DOSE) 0.7 milliLiter(s) IntraMuscular once  metoprolol tartrate 12.5 milliGRAM(s) Oral every 12 hours  multivitamin 1 Tablet(s) Oral daily  oxyCODONE    IR 5 milliGRAM(s) Oral every 8 hours  pantoprazole    Tablet 40 milliGRAM(s) Oral before breakfast  vancomycin  IVPB 1000 milliGRAM(s) IV Intermittent every 24 hours    MEDICATIONS  (PRN):          PHYSICAL EXAM:  GENERAL: NAD, well-groomed, well-developed  HEAD:  Atraumatic, Normocephalic  CHEST/LUNG: Clear to percussion bilaterally; No rales, rhonchi, wheezing, or rubs  HEART: Regular rate and rhythm; No murmurs, rubs, or gallops  ABDOMEN: Soft, Nontender, Nondistended; Bowel sounds present  EXTREMITIES:  2+ Peripheral Pulses, No clubbing, cyanosis, or edema  LYMPH: No lymphadenopathy noted  SKIN: No rashes or lesions    Care Discussed with Consultants/Other Providers [ ] YES  [ ] NO
  Follow Up:  foot gangrene    Interval History: pt afebrile and doing well, no acute events    ROS:      All other systems negative    Constitutional: no fever, no chills  ENT:  no sore throat, no rhinorrhea  Cardiovascular:  no chest pain, no palpitation  Respiratory:  no SOB, no cough  GI:  no abd pain, no vomiting, no diarrhea  urinary: no dysuria, no hematuria, no flank pain  musculoskeletal:  no joint pain, no joint swelling  skin:  no rash  neurology:  no headache, no seizure    Allergies  ACE inhibitors (Angioedema)  amoxicillin (Angioedema)  irbesartan (Angioedema)  Norvasc (Angioedema)        ANTIMICROBIALS:  cefepime   IVPB 2000 every 12 hours  vancomycin  IVPB 1000 every 24 hours      OTHER MEDS:  acetaminophen     Tablet .. 650 milliGRAM(s) Oral every 6 hours  apixaban 2.5 milliGRAM(s) Oral every 12 hours  ascorbic acid 500 milliGRAM(s) Oral daily  aspirin  chewable 81 milliGRAM(s) Oral daily  atorvastatin 10 milliGRAM(s) Oral at bedtime  chlorhexidine 4% Liquid 1 Application(s) Topical <User Schedule>  influenza  Vaccine (HIGH DOSE) 0.7 milliLiter(s) IntraMuscular once  metoprolol tartrate 12.5 milliGRAM(s) Oral every 12 hours  multivitamin 1 Tablet(s) Oral daily  oxyCODONE    IR 5 milliGRAM(s) Oral every 8 hours  pantoprazole    Tablet 40 milliGRAM(s) Oral before breakfast      Vital Signs Last 24 Hrs  T(C): 36.7 (25 Dec 2021 15:43), Max: 36.9 (25 Dec 2021 05:30)  T(F): 98 (25 Dec 2021 15:43), Max: 98.4 (25 Dec 2021 05:30)  HR: 88 (25 Dec 2021 13:23) (88 - 98)  BP: 114/66 (25 Dec 2021 15:43) (103/69 - 114/66)  BP(mean): --  RR: 18 (25 Dec 2021 15:43) (18 - 18)  SpO2: 94% (25 Dec 2021 13:23) (94% - 98%)    Physical Exam:  General:    NAD,  non toxic, sitting on a chair  Cardio:     regular S1, S2,  no murmur  Respiratory:    clear b/l,    no wheezing  abd:     soft,   BS +,   no tenderness  :   no CVAT,  no suprapubic tenderness,   no  roberto  Musculoskeletal:   R TMA  vascular: no phlebitis  Skin:    no rash                          8.9    8.39  )-----------( 421      ( 25 Dec 2021 07:02 )             29.3       12-25    137  |  105  |  30<H>  ----------------------------<  80  4.2   |  19<L>  |  0.69    Ca    9.8      25 Dec 2021 07:02            MICROBIOLOGY:  v  .Surgical Swab right foot  12-22-21   Rare Methicillin Resistant Staphylococcus aureus  Rare Proteus mirabilis  --  Methicillin resistant Staphylococcus aureus  Proteus mirabilis      .Abscess R foot  12-20-21   Numerous Methicillin Resistant Staphylococcus aureus  Rare Proteus mirabilis  --  Methicillin resistant Staphylococcus aureus  Proteus mirabilis                RADIOLOGY:  Images independently visualized and reviewed personally, findings as below  < from: Xray Foot AP + Lateral + Oblique, Right (12.21.21 @ 18:36) >  IMPRESSION:  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    Evenly spaced focal small soft tissue lucencies over distal Achilles   region on lateral view related to concurrently performed tendo Achilles   lengthening procedure. Otherwise no proximally tracking gas collections   beyond the amputation margins and no focal areas of osteomyelitis.    Splint material supports plantar and posterior surfaces of the extremity.    Correlate with intraoperative findings.    < end of copied text >  
Patient is a 81y old  Female who presents with a chief complaint of right foot pain (25 Dec 2021 16:17)    Date of servie : 12-26-21 @ 18:36  INTERVAL HPI/OVERNIGHT EVENTS:  T(C): 36.7 (12-26-21 @ 11:11), Max: 36.8 (12-25-21 @ 20:33)  HR: 87 (12-26-21 @ 11:11) (64 - 93)  BP: 103/68 (12-26-21 @ 11:11) (103/68 - 126/78)  RR: 18 (12-26-21 @ 11:11) (18 - 18)  SpO2: 95% (12-26-21 @ 11:11) (94% - 96%)  Wt(kg): --  I&O's Summary    25 Dec 2021 07:01  -  26 Dec 2021 07:00  --------------------------------------------------------  IN: 600 mL / OUT: 700 mL / NET: -100 mL    26 Dec 2021 07:01  -  26 Dec 2021 18:36  --------------------------------------------------------  IN: 290 mL / OUT: 200 mL / NET: 90 mL        LABS:                        8.7    7.59  )-----------( 445      ( 26 Dec 2021 06:59 )             28.1     12-26    133<L>  |  102  |  29<H>  ----------------------------<  94  4.1   |  20<L>  |  0.65    Ca    9.9      26 Dec 2021 07:15          CAPILLARY BLOOD GLUCOSE                MEDICATIONS  (STANDING):  acetaminophen     Tablet .. 650 milliGRAM(s) Oral every 6 hours  apixaban 2.5 milliGRAM(s) Oral every 12 hours  ascorbic acid 500 milliGRAM(s) Oral daily  aspirin  chewable 81 milliGRAM(s) Oral daily  atorvastatin 10 milliGRAM(s) Oral at bedtime  cefepime   IVPB 2000 milliGRAM(s) IV Intermittent every 12 hours  chlorhexidine 4% Liquid 1 Application(s) Topical <User Schedule>  influenza  Vaccine (HIGH DOSE) 0.7 milliLiter(s) IntraMuscular once  metoprolol tartrate 12.5 milliGRAM(s) Oral every 12 hours  multivitamin 1 Tablet(s) Oral daily  oxyCODONE    IR 5 milliGRAM(s) Oral every 8 hours  pantoprazole    Tablet 40 milliGRAM(s) Oral before breakfast  vancomycin  IVPB 1000 milliGRAM(s) IV Intermittent every 24 hours    MEDICATIONS  (PRN):          PHYSICAL EXAM:  GENERAL: NAD, well-groomed, well-developed  HEAD:  Atraumatic, Normocephalic  CHEST/LUNG: Clear to percussion bilaterally; No rales, rhonchi, wheezing, or rubs  HEART: Regular rate and rhythm; No murmurs, rubs, or gallops  ABDOMEN: Soft, Nontender, Nondistended; Bowel sounds present  EXTREMITIES:  2dressin +    Care Discussed with Consultants/Other Providers [ ] YES  [ ] NO
Patient is a 81y old  Female who presents with a chief complaint of right foot pain (27 Dec 2021 16:47)    Date of servie : 12-27-21 @ 18:24  INTERVAL HPI/OVERNIGHT EVENTS:  T(C): 36.7 (12-27-21 @ 16:26), Max: 37 (12-26-21 @ 19:34)  HR: 85 (12-27-21 @ 16:26) (85 - 99)  BP: 111/64 (12-27-21 @ 16:26) (106/66 - 119/69)  RR: 18 (12-27-21 @ 16:26) (18 - 18)  SpO2: 100% (12-27-21 @ 16:26) (93% - 100%)  Wt(kg): --  I&O's Summary    26 Dec 2021 07:01  -  27 Dec 2021 07:00  --------------------------------------------------------  IN: 470 mL / OUT: 700 mL / NET: -230 mL    27 Dec 2021 07:01  -  27 Dec 2021 18:24  --------------------------------------------------------  IN: 440 mL / OUT: 400 mL / NET: 40 mL        LABS:                        8.7    7.59  )-----------( 445      ( 26 Dec 2021 06:59 )             28.1     12-26    133<L>  |  102  |  29<H>  ----------------------------<  94  4.1   |  20<L>  |  0.65    Ca    9.9      26 Dec 2021 07:15          CAPILLARY BLOOD GLUCOSE                MEDICATIONS  (STANDING):  acetaminophen     Tablet .. 650 milliGRAM(s) Oral every 6 hours  apixaban 2.5 milliGRAM(s) Oral every 12 hours  ascorbic acid 500 milliGRAM(s) Oral daily  aspirin  chewable 81 milliGRAM(s) Oral daily  atorvastatin 10 milliGRAM(s) Oral at bedtime  cefepime   IVPB 2000 milliGRAM(s) IV Intermittent every 12 hours  chlorhexidine 4% Liquid 1 Application(s) Topical <User Schedule>  metoprolol tartrate 12.5 milliGRAM(s) Oral every 12 hours  multivitamin 1 Tablet(s) Oral daily  oxyCODONE    IR 5 milliGRAM(s) Oral every 8 hours  pantoprazole    Tablet 40 milliGRAM(s) Oral before breakfast  vancomycin  IVPB 1000 milliGRAM(s) IV Intermittent every 24 hours    MEDICATIONS  (PRN):          PHYSICAL EXAM:  GENERAL: NAD, well-groomed, well-developed  HEAD:  Atraumatic, Normocephalic  CHEST/LUNG: Clear to percussion bilaterally; No rales, rhonchi, wheezing, or rubs  HEART: Regular rate and rhythm; No murmurs, rubs, or gallops  ABDOMEN: Soft, Nontender, Nondistended; Bowel sounds present  EXTREMITIES:  2+ Peripheral Pulses, No clubbing, cyanosis, or edema  LYMPH: No lymphadenopathy noted  SKIN: No rashes or lesions    Care Discussed with Consultants/Other Providers [ ] YES  [ ] NO
Podiatry pager #: [phone] ([place])/ [phone] ([facility])    Patient is a 81y old  Female who presents with a chief complaint of      INTERVAL HPI/OVERNIGHT EVENTS:  Patient seen and evaluated at bedside.  Pt is resting comfortable in NAD. Denies N/V/F/C.     Allergies    ACE inhibitors (Angioedema)  amoxicillin (Angioedema)  irbesartan (Angioedema)  Norvasc (Angioedema)    Intolerances        Vital Signs Last 24 Hrs  T(C): 37.3 (20 Dec 2021 04:55), Max: 37.3 (20 Dec 2021 04:55)  T(F): 99.1 (20 Dec 2021 04:55), Max: 99.1 (20 Dec 2021 04:55)  HR: 100 (20 Dec 2021 04:55) (71 - 100)  BP: 138/77 (20 Dec 2021 04:55) (112/65 - 150/80)  BP(mean): --  RR: 18 (20 Dec 2021 04:55) (18 - 20)  SpO2: 97% (20 Dec 2021 04:55) (96% - 97%)    LABS:                        9.3    10.04 )-----------( 357      ( 19 Dec 2021 21:30 )             29.4     12-19    135  |  104  |  23  ----------------------------<  134<H>  4.5   |  19<L>  |  0.70    Ca    9.5      19 Dec 2021 21:30    TPro  6.9  /  Alb  3.1<L>  /  TBili  0.1<L>  /  DBili  x   /  AST  21  /  ALT  12  /  AlkPhos  179<H>  12-19    PT/INR - ( 19 Dec 2021 21:30 )   PT: 12.5 sec;   INR: 1.04 ratio         PTT - ( 19 Dec 2021 21:30 )  PTT:22.5 sec    CAPILLARY BLOOD GLUCOSE          Lower Extremity Physical Exam:  Vascular: DP/PT 2/4 to R foot, 0/4 to L foot, CFT diminished to R foot first and second digit, less than 3s to other digits B/L, Temperature gradient warm to warm on R and warm to cool on L.   Neuro: Epicritic sensation intact to the level of digits, B/L.  Musculoskeletal/Ortho: unremarkable   Skin: Right foot first and second digit dry gangrene to the level of the metatarsal phalangeal joints with mild wet conversion at bases, periwound erythema to the forefoot, no crepitus, no purulence, no drainage, and no malodor. R foot heel with abrasion to level of dermis with no signs of infection. Left foot with no clinical signs of infection or lesions.     RADIOLOGY & ADDITIONAL TESTS:  
  Follow Up:  foot gangrene    Interval History: pt afebrile and doing well, no acute events    ROS:      All other systems negative    Constitutional: no fever, no chills  ENT:  no sore throat, no rhinorrhea  Cardiovascular:  no chest pain, no palpitation  Respiratory:  no SOB, no cough  GI:  no abd pain, no vomiting, no diarrhea  urinary: no dysuria, no hematuria, no flank pain  musculoskeletal:  no joint pain, no joint swelling  skin:  no rash  neurology:  no headache, no seizure      Allergies  ACE inhibitors (Angioedema)  amoxicillin (Angioedema)  irbesartan (Angioedema)  Norvasc (Angioedema)        ANTIMICROBIALS:  cefepime   IVPB 2000 every 12 hours  vancomycin  IVPB 1000 every 24 hours      OTHER MEDS:  acetaminophen     Tablet .. 650 milliGRAM(s) Oral every 6 hours  apixaban 2.5 milliGRAM(s) Oral every 12 hours  ascorbic acid 500 milliGRAM(s) Oral daily  aspirin  chewable 81 milliGRAM(s) Oral daily  atorvastatin 10 milliGRAM(s) Oral at bedtime  chlorhexidine 4% Liquid 1 Application(s) Topical <User Schedule>  metoprolol tartrate 12.5 milliGRAM(s) Oral every 12 hours  multivitamin 1 Tablet(s) Oral daily  oxyCODONE    IR 5 milliGRAM(s) Oral every 8 hours  pantoprazole    Tablet 40 milliGRAM(s) Oral before breakfast      Vital Signs Last 24 Hrs  T(C): 36.7 (27 Dec 2021 16:26), Max: 37 (26 Dec 2021 19:34)  T(F): 98.1 (27 Dec 2021 16:26), Max: 98.6 (26 Dec 2021 19:34)  HR: 85 (27 Dec 2021 16:26) (85 - 99)  BP: 111/64 (27 Dec 2021 16:26) (106/66 - 119/69)  BP(mean): 88 (27 Dec 2021 06:26) (88 - 88)  RR: 18 (27 Dec 2021 16:26) (18 - 18)  SpO2: 100% (27 Dec 2021 16:26) (93% - 100%)    Physical Exam:  General:    NAD,  non toxic, sitting on a chair  Cardio:     regular S1, S2,  no murmur  Respiratory:    clear b/l,    no wheezing  abd:     soft,   BS +,   no tenderness  :   no CVAT,  no suprapubic tenderness,   no  roberto  Musculoskeletal:   R TMA  vascular: no phlebitis  Skin:    no rash                          8.7    7.59  )-----------( 445      ( 26 Dec 2021 06:59 )             28.1       12-26    133<L>  |  102  |  29<H>  ----------------------------<  94  4.1   |  20<L>  |  0.65    Ca    9.9      26 Dec 2021 07:15            MICROBIOLOGY:  v  .Surgical Swab right foot  12-22-21   Rare Methicillin Resistant Staphylococcus aureus  Rare Proteus mirabilis  --  Methicillin resistant Staphylococcus aureus  Proteus mirabilis      .Abscess R foot  12-20-21   Numerous Methicillin Resistant Staphylococcus aureus  Rare Proteus mirabilis  --  Methicillin resistant Staphylococcus aureus  Proteus mirabilis                RADIOLOGY:  Images independently visualized and reviewed personally, findings as below  < from: Xray Foot AP + Lateral + Oblique, Right (12.21.21 @ 18:36) >  IMPRESSION:  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    Evenly spaced focal small soft tissue lucencies over distal Achilles   region on lateral view related to concurrently performed tendo Achilles   lengthening procedure. Otherwise no proximally tracking gas collections   beyond the amputation margins and no focal areas of osteomyelitis.    Splint material supports plantar and posterior surfaces of the extremity.    Correlate with intraoperative findings.    < end of copied text >  
  SUBJECTIVE:  Endorses some pain at amputation site, well-controlled with medication    OBJECTIVE:  Vital Signs Last 24 Hrs  T(C): 36.9 (22 Dec 2021 05:17), Max: 36.9 (21 Dec 2021 19:00)  T(F): 98.4 (22 Dec 2021 05:17), Max: 98.4 (21 Dec 2021 19:00)  HR: 89 (22 Dec 2021 12:07) (67 - 100)  BP: 97/55 (22 Dec 2021 12:07) (84/48 - 147/86)  BP(mean): 78 (21 Dec 2021 20:45) (60 - 83)  RR: 18 (22 Dec 2021 05:17) (15 - 18)  SpO2: 96% (22 Dec 2021 12:07) (96% - 100%)      12-21-21 @ 07:01  -  12-22-21 @ 07:00  --------------------------------------------------------  IN: 260 mL / OUT: 850 mL / NET: -590 mL    12-22-21 @ 07:01  -  12-22-21 @ 13:34  --------------------------------------------------------  IN: 240 mL / OUT: 600 mL / NET: -360 mL        Physical Examination:  GEN: NAD, resting quietly  PULM: symmetric chest rise bilaterally, no increased WOB  EXTR: right foot in dressing c/d/i    LABS:                        9.5    11.62 )-----------( 371      ( 22 Dec 2021 07:28 )             30.9       12-22    136  |  102  |  12  ----------------------------<  101<H>  3.7   |  21<L>  |  0.59    Ca    9.0      22 Dec 2021 07:28        
  SUBJECTIVE:  No acute complaints    OBJECTIVE:  Vital Signs Last 24 Hrs  T(C): 36.8 (21 Dec 2021 08:57), Max: 37.4 (21 Dec 2021 04:59)  T(F): 98.2 (21 Dec 2021 08:57), Max: 99.4 (21 Dec 2021 04:59)  HR: 93 (21 Dec 2021 08:57) (71 - 110)  BP: 123/69 (21 Dec 2021 08:57) (101/58 - 128/73)  BP(mean): --  RR: 18 (21 Dec 2021 08:57) (18 - 19)  SpO2: 95% (21 Dec 2021 08:57) (95% - 99%)      12-20-21 @ 07:01  -  12-21-21 @ 07:00  --------------------------------------------------------  IN: 540 mL / OUT: 200 mL / NET: 340 mL    12-21-21 @ 07:01  -  12-21-21 @ 11:20  --------------------------------------------------------  IN: 0 mL / OUT: 150 mL / NET: -150 mL        Physical Examination:  GEN: NAD, resting quietly  PULM: symmetric chest rise bilaterally, no increased WOB  EXTR: R foot wound in dressing c/d/i      LABS:                        9.6    8.15  )-----------( 357      ( 21 Dec 2021 07:31 )             29.6       12-21    133<L>  |  102  |  17  ----------------------------<  103<H>  3.8   |  20<L>  |  0.61    Ca    9.5      21 Dec 2021 07:28    TPro  6.9  /  Alb  3.1<L>  /  TBili  0.1<L>  /  DBili  x   /  AST  21  /  ALT  12  /  AlkPhos  179<H>  12-19      
DATE OF SERVICE: 12-22-21 @ 12:21    Patient is a 81y old  Female who presents with a chief complaint of right foot pain (22 Dec 2021 09:57)      SUBJECTIVE / OVERNIGHT EVENTS:  No chest pain. No shortness of breath. No complaints. No events overnight. daughter at bedside    MEDICATIONS  (STANDING):  acetaminophen     Tablet .. 650 milliGRAM(s) Oral every 6 hours  atorvastatin 10 milliGRAM(s) Oral at bedtime  cefepime   IVPB 2000 milliGRAM(s) IV Intermittent every 12 hours  chlorhexidine 4% Liquid 1 Application(s) Topical <User Schedule>  heparin   Injectable 5000 Unit(s) SubCutaneous every 8 hours  influenza  Vaccine (HIGH DOSE) 0.7 milliLiter(s) IntraMuscular once  metoprolol tartrate 12.5 milliGRAM(s) Oral every 12 hours  oxyCODONE    IR 5 milliGRAM(s) Oral every 8 hours  pantoprazole    Tablet 40 milliGRAM(s) Oral before breakfast    MEDICATIONS  (PRN):      Vital Signs Last 24 Hrs  T(C): 36.9 (22 Dec 2021 05:17), Max: 36.9 (21 Dec 2021 19:00)  T(F): 98.4 (22 Dec 2021 05:17), Max: 98.4 (21 Dec 2021 19:00)  HR: 89 (22 Dec 2021 12:07) (67 - 100)  BP: 97/55 (22 Dec 2021 12:07) (84/48 - 147/86)  BP(mean): 78 (21 Dec 2021 20:45) (60 - 83)  RR: 18 (22 Dec 2021 05:17) (15 - 18)  SpO2: 96% (22 Dec 2021 12:07) (96% - 100%)  CAPILLARY BLOOD GLUCOSE      POCT Blood Glucose.: 95 mg/dL (21 Dec 2021 17:52)    I&O's Summary    21 Dec 2021 07:01  -  22 Dec 2021 07:00  --------------------------------------------------------  IN: 260 mL / OUT: 850 mL / NET: -590 mL    22 Dec 2021 07:01  -  22 Dec 2021 12:21  --------------------------------------------------------  IN: 240 mL / OUT: 0 mL / NET: 240 mL        PHYSICAL EXAM:  GENERAL: NAD, well-developed  HEAD:  Atraumatic, Normocephalic  EYES: EOMI, PERRLA, conjunctiva and sclera clear  NECK: Supple, No JVD  CHEST/LUNG: Clear to auscultation bilaterally; No wheeze  HEART: Regular rate and rhythm; No murmurs, rubs, or gallops  ABDOMEN: Soft, Nontender, Nondistended; Bowel sounds present  EXTREMITIES:  2+ Peripheral Pulses, No clubbing, cyanosis, or edema  PSYCH: AAOx3  NEUROLOGY: non-focal  SKIN: No rashes or lesions    LABS:                        9.5    11.62 )-----------( 371      ( 22 Dec 2021 07:28 )             30.9     12-22    136  |  102  |  12  ----------------------------<  101<H>  3.7   |  21<L>  |  0.59    Ca    9.0      22 Dec 2021 07:28      PT/INR - ( 21 Dec 2021 07:31 )   PT: 12.8 sec;   INR: 1.07 ratio         PTT - ( 21 Dec 2021 07:31 )  PTT:26.5 sec          RADIOLOGY & ADDITIONAL TESTS:    Imaging Personally Reviewed:    Consultant(s) Notes Reviewed:      Care Discussed with Consultants/Other Providers:  
Patient is a 81y old  Female who presents with a chief complaint of right foot pain (20 Dec 2021 11:36)      INTERVAL HPI/OVERNIGHT EVENTS:   Pt is scheduled for Right foot transmetatarsal amputation w/ ROB with Dr. Hsu at 3:30pm. Patient is aware of procedure and is NPO since midnight.    MEDICATIONS  (STANDING):  acetaminophen     Tablet .. 650 milliGRAM(s) Oral every 6 hours  atorvastatin 10 milliGRAM(s) Oral at bedtime  cefepime   IVPB 2000 milliGRAM(s) IV Intermittent every 12 hours  influenza  Vaccine (HIGH DOSE) 0.7 milliLiter(s) IntraMuscular once  metoprolol tartrate 12.5 milliGRAM(s) Oral every 12 hours  pantoprazole    Tablet 40 milliGRAM(s) Oral before breakfast  vancomycin  IVPB 1000 milliGRAM(s) IV Intermittent every 12 hours    MEDICATIONS  (PRN):  oxyCODONE    IR 5 milliGRAM(s) Oral every 4 hours PRN Moderate Pain (4 - 6)  oxyCODONE    IR 10 milliGRAM(s) Oral every 4 hours PRN Severe Pain (7 - 10)      Allergies    ACE inhibitors (Angioedema)  amoxicillin (Angioedema)  irbesartan (Angioedema)  Norvasc (Angioedema)    Intolerances        Vital Signs Last 24 Hrs  T(C): 37.4 (21 Dec 2021 04:59), Max: 37.4 (21 Dec 2021 04:59)  T(F): 99.4 (21 Dec 2021 04:59), Max: 99.4 (21 Dec 2021 04:59)  HR: 109 (21 Dec 2021 04:59) (71 - 110)  BP: 125/72 (21 Dec 2021 04:59) (101/58 - 128/73)  BP(mean): --  RR: 19 (21 Dec 2021 04:59) (18 - 19)  SpO2: 98% (21 Dec 2021 04:59) (96% - 99%)    LABS:                        9.3    10.04 )-----------( 357      ( 19 Dec 2021 21:30 )             29.4     12-19    135  |  104  |  23  ----------------------------<  134<H>  4.5   |  19<L>  |  0.70    Ca    9.5      19 Dec 2021 21:30    TPro  6.9  /  Alb  3.1<L>  /  TBili  0.1<L>  /  DBili  x   /  AST  21  /  ALT  12  /  AlkPhos  179<H>  12-19    PT/INR - ( 19 Dec 2021 21:30 )   PT: 12.5 sec;   INR: 1.04 ratio         PTT - ( 19 Dec 2021 21:30 )  PTT:22.5 sec    CAPILLARY BLOOD GLUCOSE          RADIOLOGY & ADDITIONAL TESTS:    
Patient is a 81y old  Female who presents with a chief complaint of right foot pain (22 Dec 2021 13:34)    Date of servie : 12-23-21 @ 14:47  INTERVAL HPI/OVERNIGHT EVENTS:  T(C): 36.7 (12-23-21 @ 13:40), Max: 37.1 (12-22-21 @ 20:10)  HR: 90 (12-23-21 @ 13:40) (84 - 98)  BP: 114/58 (12-23-21 @ 13:40) (100/60 - 116/72)  RR: 18 (12-23-21 @ 13:40) (17 - 18)  SpO2: 97% (12-23-21 @ 13:40) (96% - 98%)  Wt(kg): --  I&O's Summary    22 Dec 2021 07:01  -  23 Dec 2021 07:00  --------------------------------------------------------  IN: 770 mL / OUT: 1310 mL / NET: -540 mL    23 Dec 2021 07:01  -  23 Dec 2021 14:47  --------------------------------------------------------  IN: 240 mL / OUT: 0 mL / NET: 240 mL        LABS:                        8.7    10.01 )-----------( 381      ( 23 Dec 2021 06:59 )             27.2     12-23    137  |  103  |  16  ----------------------------<  104<H>  3.7   |  20<L>  |  0.72    Ca    9.5      23 Dec 2021 06:55          CAPILLARY BLOOD GLUCOSE                MEDICATIONS  (STANDING):  acetaminophen     Tablet .. 650 milliGRAM(s) Oral every 6 hours  ascorbic acid 500 milliGRAM(s) Oral daily  atorvastatin 10 milliGRAM(s) Oral at bedtime  cefepime   IVPB 2000 milliGRAM(s) IV Intermittent every 12 hours  chlorhexidine 4% Liquid 1 Application(s) Topical <User Schedule>  heparin   Injectable 5000 Unit(s) SubCutaneous every 8 hours  influenza  Vaccine (HIGH DOSE) 0.7 milliLiter(s) IntraMuscular once  metoprolol tartrate 12.5 milliGRAM(s) Oral every 12 hours  multivitamin 1 Tablet(s) Oral daily  oxyCODONE    IR 5 milliGRAM(s) Oral every 8 hours  pantoprazole    Tablet 40 milliGRAM(s) Oral before breakfast    MEDICATIONS  (PRN):          PHYSICAL EXAM:  GENERAL: NAD, well-groomed, well-developed  HEAD:  Atraumatic, Normocephalic  CHEST/LUNG: Clear to percussion bilaterally; No rales, rhonchi, wheezing, or rubs  HEART: Regular rate and rhythm; No murmurs, rubs, or gallops  ABDOMEN: Soft, Nontender, Nondistended; Bowel sounds present  EXTREMITIES: dressi    Care Discussed with Consultant 
Patient is a 81y old  Female who presents with a chief complaint of right foot pain (24 Dec 2021 09:04)    Date of servie : 12-24-21 @ 14:41  INTERVAL HPI/OVERNIGHT EVENTS:  T(C): 36.7 (12-24-21 @ 13:11), Max: 36.7 (12-24-21 @ 13:11)  HR: 97 (12-24-21 @ 13:11) (93 - 106)  BP: 111/66 (12-24-21 @ 13:11) (101/61 - 120/73)  RR: 18 (12-24-21 @ 13:11) (18 - 18)  SpO2: 100% (12-24-21 @ 13:11) (98% - 100%)  Wt(kg): --  I&O's Summary    23 Dec 2021 07:01  -  24 Dec 2021 07:00  --------------------------------------------------------  IN: 480 mL / OUT: 1300 mL / NET: -820 mL    24 Dec 2021 07:01  -  24 Dec 2021 14:41  --------------------------------------------------------  IN: 530 mL / OUT: 600 mL / NET: -70 mL        LABS:                        8.7    8.11  )-----------( 379      ( 24 Dec 2021 07:41 )             28.5     12-24    136  |  103  |  25<H>  ----------------------------<  78  4.4   |  18<L>  |  0.72    Ca    9.5      24 Dec 2021 07:41          CAPILLARY BLOOD GLUCOSE                MEDICATIONS  (STANDING):  acetaminophen     Tablet .. 650 milliGRAM(s) Oral every 6 hours  ascorbic acid 500 milliGRAM(s) Oral daily  atorvastatin 10 milliGRAM(s) Oral at bedtime  cefepime   IVPB 2000 milliGRAM(s) IV Intermittent every 12 hours  chlorhexidine 4% Liquid 1 Application(s) Topical <User Schedule>  heparin   Injectable 5000 Unit(s) SubCutaneous every 8 hours  influenza  Vaccine (HIGH DOSE) 0.7 milliLiter(s) IntraMuscular once  metoprolol tartrate 12.5 milliGRAM(s) Oral every 12 hours  multivitamin 1 Tablet(s) Oral daily  oxyCODONE    IR 5 milliGRAM(s) Oral every 8 hours  pantoprazole    Tablet 40 milliGRAM(s) Oral before breakfast  vancomycin  IVPB 1000 milliGRAM(s) IV Intermittent every 12 hours    MEDICATIONS  (PRN):          PHYSICAL EXAM:  GENERAL: NAD, well-groomed, well-developed  HEAD:  Atraumatic, Normocephalic  CHEST/LUNG: Clear to percussion bilaterally; No rales, rhonchi, wheezing, or rubs  HEART: Regular rate and rhythm; No murmurs, rubs, or gallops  ABDOMEN: Soft, Nontender, Nondistended; Bowel sounds present  EXTREMITIES: dressing +    Care Discussed with Consultants/Other Providers [x ] YES  [ ] NO
Podiatry pager #: [phone] ([place])/ [phone] ([facility])    Patient is a 81y old  Female who presents with a chief complaint of right foot pain (23 Dec 2021 14:47)       INTERVAL HPI/OVERNIGHT EVENTS:  Patient seen and evaluated at bedside.  Pt is resting comfortable in NAD. Denies N/V/F/C.     Allergies    ACE inhibitors (Angioedema)  amoxicillin (Angioedema)  irbesartan (Angioedema)  Norvasc (Angioedema)    Intolerances        Vital Signs Last 24 Hrs  T(C): 36.4 (24 Dec 2021 04:42), Max: 36.7 (23 Dec 2021 13:40)  T(F): 97.6 (24 Dec 2021 04:42), Max: 98.1 (23 Dec 2021 13:40)  HR: 93 (24 Dec 2021 04:42) (90 - 106)  BP: 120/73 (24 Dec 2021 04:42) (101/61 - 120/73)  BP(mean): --  RR: 18 (24 Dec 2021 04:42) (18 - 18)  SpO2: 98% (24 Dec 2021 04:42) (97% - 98%)    LABS:                        8.7    8.11  )-----------( 379      ( 24 Dec 2021 07:41 )             28.5     12-24    136  |  103  |  25<H>  ----------------------------<  78  4.4   |  18<L>  |  0.72    Ca    9.5      24 Dec 2021 07:41          CAPILLARY BLOOD GLUCOSE          Lower Extremity Physical Exam:  Vascular: DP/PT 2/4 to R foot, 0/4 to L foot, CFT diminished to R foot first and second digit, less than 3s to other digits B/L, Temperature gradient warm to warm on R and warm to cool on L.   Neuro: Epicritic sensation intact to the level of digits, B/L.  Musculoskeletal/Ortho: unremarkable   Skin: s/p 12/21 RF TMA w/ ROB closed, sutures intact, well coapted, no dehiscence, mild periwound erythema    RADIOLOGY & ADDITIONAL TESTS:  
Podiatry pager #: [phone] ([place])/ [phone] ([facility])    Patient is a 81y old  Female who presents with a chief complaint of right foot pain (21 Dec 2021 19:40)       INTERVAL HPI/OVERNIGHT EVENTS:  Patient seen and evaluated at bedside.  Pt is resting comfortable in NAD. Denies N/V/F/C.     Allergies    ACE inhibitors (Angioedema)  amoxicillin (Angioedema)  irbesartan (Angioedema)  Norvasc (Angioedema)    Intolerances        Vital Signs Last 24 Hrs  T(C): 36.9 (22 Dec 2021 05:17), Max: 36.9 (21 Dec 2021 19:00)  T(F): 98.4 (22 Dec 2021 05:17), Max: 98.4 (21 Dec 2021 19:00)  HR: 100 (22 Dec 2021 05:17) (67 - 100)  BP: 129/76 (22 Dec 2021 05:17) (84/48 - 147/86)  BP(mean): 78 (21 Dec 2021 20:45) (60 - 83)  RR: 18 (22 Dec 2021 05:17) (15 - 18)  SpO2: 96% (22 Dec 2021 05:17) (96% - 100%)    LABS:                        9.5    11.62 )-----------( 371      ( 22 Dec 2021 07:28 )             30.9     12-22    136  |  102  |  12  ----------------------------<  101<H>  3.7   |  21<L>  |  0.59    Ca    9.0      22 Dec 2021 07:28      PT/INR - ( 21 Dec 2021 07:31 )   PT: 12.8 sec;   INR: 1.07 ratio         PTT - ( 21 Dec 2021 07:31 )  PTT:26.5 sec    CAPILLARY BLOOD GLUCOSE      POCT Blood Glucose.: 95 mg/dL (21 Dec 2021 17:52)      Lower Extremity Physical Exam:  Vascular: DP/PT 2/4 to R foot, 0/4 to L foot, CFT diminished to R foot first and second digit, less than 3s to other digits B/L, Temperature gradient warm to warm on R and warm to cool on L.   Neuro: Epicritic sensation intact to the level of digits, B/L.  Musculoskeletal/Ortho: unremarkable   Skin:     s/p 12/21 RF TMA w/ ROB, closed, sutures intact, well coapted, no dehiscence, no acute SOI    RADIOLOGY & ADDITIONAL TESTS:

## 2021-12-27 NOTE — PROGRESS NOTE ADULT - REASON FOR ADMISSION
right foot pain

## 2021-12-27 NOTE — DISCHARGE NOTE NURSING/CASE MANAGEMENT/SOCIAL WORK - PATIENT PORTAL LINK FT
You can access the FollowMyHealth Patient Portal offered by [facility] by registering at the following website: [email or web address]. By joining [facility]’s FollowMyHealth portal, you will also be able to view your health information using other applications (apps) compatible with our system.

## 2021-12-27 NOTE — DISCHARGE NOTE NURSING/CASE MANAGEMENT/SOCIAL WORK - HAS THE PATIENT RECEIVED THE INFLUENZA VACCINE THIS SEASON?
Pt frustrated stating he needs something stronger to sleep and we are not giving it to him. Pt stating he is also not getting other home meds that he has been asking for and that he has been taking his own med from home for metallic taste in his mouth.  Pt says his wife will bring him Tylenol PM to take while he is here since we are not giving him what he needs.
pt refusing IVF, pt wants to sign out AMA
no...

## 2021-12-27 NOTE — PROGRESS NOTE ADULT - ASSESSMENT
81 f with raynauds HTN angioedema and rectal ca recently s/p LAR c/b "R cold leg". She has had 2 angiograms, but still with  dry gangrenous changes of R 1st, 2nd, and 5th toes, now s/p TMA  initial wound cx and OR dirty soft tissue/bone cx with MRSA and proreus mirabilis (pan sensitive)  afebrile, normal WBC  has been on cefepime since 12/19 but only one day of vanco    toe gangrene now s/p TMA 12/21, low concern for residual bone infection  initial  wound cx and OR dirty soft tissue/bone cx with MRSA and proreus mirabilis (pan sensitive)    * c/w cefepime and  vanco 1 qd while in the hospital  * f/u the path  * if no concern for residual bone infection, can switch to PO bactrim DS bid for a 7 day course  * monitor the CBC/diff and renal function  * f/u with podiatry    The above assessment and plan was discussed with the primary team    Adia Gonzalez MD  Pager [phone]  After 5pm and on weekends call [phone]

## 2021-12-27 NOTE — PROGRESS NOTE ADULT - NUTRITIONAL ASSESSMENT
This patient has been assessed with a concern for Malnutrition and has been determined to have a diagnosis/diagnoses of Severe protein-calorie malnutrition.    This patient is being managed with:   Diet Regular-  Supplement Feeding Modality:  Oral  Ensure Enlive Cans or Servings Per Day:  2       Frequency:  Daily  Entered: Dec 22 2021 12:52PM    
This patient has been assessed with a concern for Malnutrition and has been determined to have a diagnosis/diagnoses of Severe protein-calorie malnutrition.    This patient is being managed with:   Diet DASH/TLC-  Sodium & Cholesterol Restricted  Entered: Dec 21 2021  5:51PM    The following pending diet order is being considered for treatment of Severe protein-calorie malnutrition:  Diet Regular-  Supplement Feeding Modality:  Oral  Ensure Enlive Cans or Servings Per Day:  2       Frequency:  Daily  Entered: Dec 22 2021 12:52PM  
This patient has been assessed with a concern for Malnutrition and has been determined to have a diagnosis/diagnoses of Severe protein-calorie malnutrition.    This patient is being managed with:   Diet Regular-  Supplement Feeding Modality:  Oral  Ensure Enlive Cans or Servings Per Day:  2       Frequency:  Daily  Entered: Dec 22 2021 12:52PM    

## 2022-01-24 ENCOUNTER — APPOINTMENT (OUTPATIENT)
Dept: GASTROENTEROLOGY | Facility: CLINIC | Age: 82
End: 2022-01-24
Payer: MEDICARE

## 2022-01-24 VITALS
HEIGHT: 57 IN | BODY MASS INDEX: 20.93 KG/M2 | DIASTOLIC BLOOD PRESSURE: 62 MMHG | TEMPERATURE: 98 F | SYSTOLIC BLOOD PRESSURE: 120 MMHG | WEIGHT: 97 LBS

## 2022-01-24 DIAGNOSIS — Z85.048 PERSONAL HISTORY OF OTHER MALIGNANT NEOPLASM OF RECTUM, RECTOSIGMOID JUNCTION, AND ANUS: ICD-10-CM

## 2022-01-24 DIAGNOSIS — K26.9 DUODENAL ULCER, UNSPECIFIED AS ACUTE OR CHRONIC, W/OUT HEMORRHAGE OR PERFORATION: ICD-10-CM

## 2022-01-24 PROCEDURE — 99214 OFFICE O/P EST MOD 30 MIN: CPT

## 2022-01-24 PROCEDURE — 99204 OFFICE O/P NEW MOD 45 MIN: CPT

## 2022-06-21 NOTE — PATIENT PROFILE ADULT - LIVES WITH
[address]  Wound Care    Patient Name:  Mahesh Acuña   MRN:  [id number]  Date: 6/21/2022  Diagnosis: Aspiration pneumonia    History:     Past Medical History:   Diagnosis Date    Coronary artery disease     Dementia     Depression     Diabetes mellitus     GERD (gastroesophageal reflux disease)     PVD (peripheral vascular disease)     Seizures     Stroke        Social History     Socioeconomic History    Marital status: Single   Tobacco Use    Smoking status: Unknown If Ever Smoked    Smokeless tobacco: Never Used   Substance and Sexual Activity    Alcohol use: Not Currently    Drug use: Not Currently    Sexual activity: Not Currently       Precautions:     Allergies as of 06/19/2022    (No Known Allergies)       WOC Assessment Details/Treatment        06/21/22 1200   WOCN Assessment   WOCN Total Time (mins) 30   Visit Date 06/21/22   Visit Time 1130   Consult Type New   WOCN Speciality Wound   Wound pressure   Number of Wounds 1   Intervention chart review;assessed;applied;coordination of care   Skin Interventions   Pressure Reduction Devices heel offloading device utilized;positioning supports utilized   Skin Protection adhesive use limited;silicone foam dressing in place   Pressure Injury Prevention    Check Moisture Management Pad Done   Sacral Foam Dressing Check   Heel protection technique Heel boot        Wound 06/19/22 2136 Other (comment) Left anterior;lower;proximal Leg   Date First Assessed/Time First Assessed: 06/19/22 2136   Pre-existing: Yes  Primary Wound Type: (c) Other (comment)  Side: Left  Orientation: anterior;lower;proximal  Location: Leg   Wound Image    Dressing Appearance Open to air   Drainage Amount Moderate   Appearance Yellow   Tissue loss description Full thickness   Red (%), Wound Tissue Color 50 %   Yellow (%), Wound Tissue Color 50 %   Periwound Area Intact   Wound Edges Defined;Irregular;Open   Wound Length (cm) 3.2 cm   Wound Width (cm) 4.1 cm    Wound Depth (cm) 0.3 cm   Wound Volume (cm^3) 3.936 cm^3   Wound Surface Area (cm^2) 13.12 cm^2   Care Cleansed with:;Wound cleanser   Dressing Applied;Other (comment);Non-adherent;Foam  (Petrolatum guaze contact layer; border foam)   Packing other (see comment)   Wound Pouch   (TAP)   Dressing Change Due   (Transfer to specialty; expect new orders)      Pt nonverbal, assessment and care as noted above.  Order to transfer to Specialty noted.     Orders to follow.     IP wound care signed off.    06/21/2022     adult child(cherelle)

## 2022-07-25 NOTE — ED ADULT NURSE NOTE - NSIMPLEMENTINTERV_GEN_ALL_ED
[FreeTextEntry1] : This note was written by Alfreda Perez on 06/30/2022 acting solely as a scribe for Dr. Ancelmo Sinclair.\par \par All medical record entries made by the Scribe were at my, Dr. Ancelmo Sinclair, direction and personally dictated by me on 06/30/2022. I have personally reviewed the chart and agree that the record accurately reflects my personal performance of the history, physical exam, assessment and plan. 
Implemented All Fall Risk Interventions:  [place] to call system. Call bell, personal items and telephone within reach. Instruct patient to call for assistance. Room bathroom lighting operational. Non-slip footwear when patient is off stretcher. Physically safe environment: no spills, clutter or unnecessary equipment. Stretcher in lowest position, wheels locked, appropriate side rails in place. Provide visual cue, wrist band, yellow gown, etc. Monitor gait and stability. Monitor for mental status changes and reorient to person, place, and time. Review medications for side effects contributing to fall risk. Reinforce activity limits and safety measures with patient and family.

## 2022-08-02 ENCOUNTER — APPOINTMENT (OUTPATIENT)
Dept: ORTHOPEDIC SURGERY | Facility: CLINIC | Age: 82
End: 2022-08-02

## 2022-08-29 NOTE — PROGRESS NOTE ADULT - ASSESSMENT
81y Female complaining of pain, foot.  pt with h/o raynauds HTN angioedema and rectal ca recently s/p LAR c/b "R cold leg". She has had 2 angiograms, most recently yesterday with 3 stents placed in the pedal vasculature. Pt experiencing dry gangrenous changes of R 1st, 2nd, and 5th toes. 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

## 2022-09-06 ENCOUNTER — APPOINTMENT (OUTPATIENT)
Dept: WOUND CARE | Facility: CLINIC | Age: 82
End: 2022-09-06

## 2022-11-04 NOTE — PATIENT PROFILE ADULT - HAS THE PATIENT RECEIVED THE INFLUENZA VACCINE THIS SEASON?
[facility]   Physical Therapy Treatment Note     Name: Dorothy Burt  Clinic Number: [id number]    Therapy Diagnosis:   Encounter Diagnoses   Name Primary?    Decreased strength Yes    Decreased range of motion of left shoulder      Physician: Louie Luna MD    Visit Date: 11/4/2022    Physician Orders: PT Eval and Treat   2-3 x per week x 6 weeks   Gentle passive elevation and ER, pendulums. No RTC strengthening     Medical Diagnosis from Referral:M75.112 (ICD-10-CM) - Nontraumatic incomplete tear of left rotator cuff S46.212A (ICD-10-CM) - Biceps tendon rupture, proximal, left, initial encounter M75.42 (ICD-10-CM) - Subacromial impingement, left   Evaluation Date: 6/17/2022  Authorization Period Expiration: 7/15/22  Plan of Care Expiration: 11/25/22  Progress Note Due: 11/25/22  Visit # / Visits authorized: 34/32  FOTO: 4/10  PTA Visit: 0/5     Precautions: Standard (post-op protocol - surgery 6/9/22)    Time In: 12:05 PM   Time Out: 1:00 PM   Total Billable Time: 55 (3-TE, 1-MT)    6/9/22 Procedure: Procedure(s):  1. Diagnostic arthroscopy left shoulder  2. Extensive synovectomy  3. Rotator interval release   4. Arthroscopic capsular release  5. Biceps tenotomy  6. Subacromial decompression  7. Acromioplasty   8. Rotator cuff repair     SUBJECTIVE     Pt reports: some left shoulder pain last night that woke her up twice. Describes it 1-2/10 pain. Did not have this pain the night before; she is unsure what caused it overall.    She was compliant with home exercise program.  Response to previous treatment: 2 days of soreness, not severe pain  Functional change: improving arm use and motion    Pain: 1/10  Location: left shoulder      OBJECTIVE     No today.    Treatment     Dorothy received the treatments listed below:        Dorothy received therapeutic exercises to develop strength, endurance, ROM, flexibility, posture and core stabilization for 40 minutes:     Pulleys: 2 minutes  "flexion, 2 minutes abduction    Side lying external rotation: 3x10, 2#  Sidelying shoulder abduction: 2x12, 1#  Seated 90/90 external rotation: 3x10 left     Prone Y: 2x8 left (small range of motion) - not today  Prone T with palm down: 3x10 left - not today    Assisted active seated flexion with cables: 3#, 3x10 (height number 3) - (mirror provided for visual feedback)    Wall slides: 12x5" holds   Wall ball roll ups: 2x8 with swiss ball  Wall pushups on ballet bar: 2x10    Not today  Shoulder stabilization ball on wall rolls 30x CW/30CCW (un-weighted yellow ball)  Theraband rows: green theraband 3x10   Theraband external rotation: yellow theraband 3x10 left with towel roll under elbow  Theraband internal rotation: yellow theraband 3x10 left (red increased pain today so deferred and modified to yellow)    Dorothy received the following manual therapy techniques: Joint mobilizations, Manual traction, Myofacial release, Soft tissue Mobilization, and Friction Massage were applied to the: left shoulder musculature  for 15 minutes, including:  Grade I-IV GH  AP joint mobs  Left upper trap static pressure and follow to release  Left scapulae mobilization      Cold pack was applied for 00 minutes to left shoulder.    Patient Education and Home Exercises     Home Exercises Provided and Patient Education Provided   Education provided:   - anatomy and expectations with pain and symptoms  - given yellow theraband on 11/4/22 for external rotation and internal rotation doorway strengthening at home    Written Home Exercises Provided: Patient instructed to cont prior HEP. Exercises were reviewed and Dorothy was able to demonstrate them prior to the end of the session.  Dorothy demonstrated good  understanding of the education provided. See EMR under Patient Instructions for exercises provided during therapy sessions    ASSESSMENT     Dorothy is a 70 y.o. female referred to outpatient Physical Therapy with a medical diagnosis of noncomplete " rotator cuff tear, biceps tendon rupture and subacromial impingement. Pt presents with status post left rotator cuff repair (supraspinatus), acromioplasty, biceps tenotomy, subchondral decompression, synovectomy, capsular release on 6/9/22 (week 19). Pt did well today and nearing terminal shoulder flexion and shoulder external rotation around 50% at this time. Focusing on increasing shoulder strength and overhead activities in therapy. Pain remained low overall, and keep track of night pain.    Dorothy Is progressing well towards her goals.   Pt prognosis is Good.     Pt will continue to benefit from skilled outpatient physical therapy to address the deficits listed in the problem list box on initial evaluation, provide pt/family education and to maximize pt's level of independence in the home and community environment.     Pt's spiritual, cultural and educational needs considered and pt agreeable to plan of care and goals.     Anticipated Barriers for therapy: failed conservative treatment     Goals:   Short Term Goals: 4 weeks   1.  Patient will report < 6/10 shoulder pain at worst for improved ADL performance. MET  2.  Patient will demonstrate L shoulder flexion PROM > 140 deg to improve overhead reach. MET 8/11/22  3.  Patient will demonstrate left shoulder external rotation PROM > 40 degree. MET 7/7/22  4.  Patient will demonstrate left  strength > 45 pounds.  MET 8/11/22     Long Term Goals: 12 weeks   1. Patient will report ability to wash her hair without restriction. PROGRESSING; NOT MET  2. Patient will demonstrate ability to reach overhead and remove a 2 lb object 5 times to simulate removing objects from an overhead shelf w/o an increase in symptoms. PROGRESSING; NOT MET  3. Patient will demonstrate all left shoulder AROM within 85% of right for improved ADL performance. PROGRESSING; NOT MET  4. Patient will improve FOTO to < 38% to improve ADL performance. PROGRESSING; NOT MET    PLAN     Cont with POC  with phase 3 of rehab protocol   Updated POC on 10/31/22    Plan of care Certification: 9/30/22 to 11/25/22    Jose C Lutz, PT                                unable to assess immunization status...

## 2023-01-26 NOTE — ED ADULT NURSE NOTE - PRIMARY CARE PROVIDER
unk Minocycline Pregnancy And Lactation Text: This medication is Pregnancy Category D and not consider safe during pregnancy. It is also excreted in breast milk.

## 2023-02-21 ENCOUNTER — APPOINTMENT (OUTPATIENT)
Dept: WOUND CARE | Facility: CLINIC | Age: 83
End: 2023-02-21
Payer: MEDICARE

## 2023-02-21 VITALS — TEMPERATURE: 98.3 F

## 2023-02-21 PROCEDURE — 11042 DBRDMT SUBQ TIS 1ST 20SQCM/<: CPT

## 2023-03-07 ENCOUNTER — APPOINTMENT (OUTPATIENT)
Dept: WOUND CARE | Facility: CLINIC | Age: 83
End: 2023-03-07
Payer: MEDICARE

## 2023-03-07 PROCEDURE — 11042 DBRDMT SUBQ TIS 1ST 20SQCM/<: CPT

## 2023-03-07 NOTE — HISTORY OF PRESENT ILLNESS
[FreeTextEntry1] : I have just had the pleasure of seeing Mrs. Katie Harrell in follow up for lower extremity arterial insufficiency. Mrs. Harrell is a delightful 81-year-old lady who initially presented with a longstanding history of lower extremity venous insufficiency. On 4/3/2018, she underwent right GSV RFA and stab phlebectomy. Follow up ultrasonography demonstrated a closed GSV, without evidence of DVT. Following this, Mrs. Harrell fractured her pelvis (late 2018). This was managed non-operatively. She worked with physical therapy and has largely recovered from her injury. She was ambulating with a cane. During her last follow up, she complained of constant pain in her legs and feet (at rest and when ambulating). KEREN were 0.96/0.86 (right) and 0.74/0.73 (left). We discussed proceeding with angiography, however, she deferred the intervention. \par Following her last visit and approximately one month ago, Mrs. Harrell underwent colon resection and ostomy creation (LAR) for colorectal malignancy. During her hospitalization, she developed pain and coolness of the right lower extremity. She was placed on therapeutic Eliquis. She then presented to [facility] with hemoptysis and was found to have Hgb 3, as well as ischemic changes in the right foot. She was transferred to [facility], where she underwent EGD with GI and inguinal hernia repair with GS. Once she was deemed at acceptable risk for anticoagulation, right leg angiography was performed. 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 PTA of the PT was performed. She has since been discharged home. She is currently taking Aspirin 81mg daily and Eliquis 2.5mg BID. She has ischemic changes of the right 1st toe (dry gangrene), blistering of the 2nd toe and mild ischemic changes of the 5th toe. She was evaluated at the [facility] today for a decision for hyperbaric oxygen therapy.\par \par PMH: Raynaud's, SLE, HTN, rectal cancer, pacemaker\par \par Medications: Lipitor, Carafate, Aspirin, Clarithromycin, Metoprolol, Flagyl, Protonix, Eliquis\par \par Allergies: ACEI, Irbesartan, Norvasc, Amoxicillin\par \par Social history: Never smoker\par \par FH: NC\par

## 2023-03-07 NOTE — PHYSICAL EXAM
[de-identified] : On physical examination the patient is in no acute distress and neurologically intact. The lungs are clear to auscultation and the heart has a regular rate and rhythm. Abdomen- ostomy WNL. Bilateral femoral pulses are palpable. Pedal pulses are non-palpable. Right ankle with some edema. Dry gangrene of 1st distal toe. Ulceration/blistering of 1st webspace/2nd toe, minimal ischemic changes of 5th toe. Small area of skin breakdown over 1st metatarsal head.

## 2023-03-07 NOTE — ASSESSMENT
[FreeTextEntry1] : In summary, Mrs. Bogdanowicz p/w RLE PAD/Raynaud's/SLE. KEREN today were 0.53/0.47 (right) and 0.73/0.6 (left). \par -She should continue Aspirin and Eliquis as prescribed\par -I instructed her to restart Lipitor\par -I will reach out to her Cardiologist Dr. Grissom at [facility] ([phone]) to inquire whether she has any contraindication to starting Cilostazol\par -She will follow up with the [facility] for hyperbaric oxygen therapy; she is to apply Betadine to the foot wounds\par -She will follow up with Rheumatology to inquire about any additional treatment needed for Raynaud's/SLE\par -She will follow up once her [facility] evaluation is completed\par --aseptic debridemont with dermal currette down to sub q and not beyond with with hamlet and dsd right foot\par --f/u 2 weeks\par

## 2023-03-14 NOTE — PHYSICAL EXAM
[Please See PDF for [employer]] : Please See PDF for [employer]. [de-identified] : On physical examination the patient is in no acute distress and neurologically intact. The lungs are clear to auscultation and the heart has a regular rate and rhythm. Abdomen- ostomy WNL. Bilateral femoral pulses are palpable. Pedal pulses are non-palpable. Right ankle with some edema. Dry gangrene of 1st distal toe. Ulceration/blistering of 1st webspace/2nd toe, minimal ischemic changes of 5th toe. Small area of skin breakdown over 1st metatarsal head.

## 2023-03-14 NOTE — HISTORY OF PRESENT ILLNESS
[FreeTextEntry1] : I have just had the pleasure of seeing Mrs. Katie Harrell in follow up for lower extremity arterial insufficiency. Mrs. Harrell is a delightful 81-year-old lady who initially presented with a longstanding history of lower extremity venous insufficiency. On 4/3/2018, she underwent right GSV RFA and stab phlebectomy. Follow up ultrasonography demonstrated a closed GSV, without evidence of DVT. Following this, Mrs. Harrell fractured her pelvis (late 2018). This was managed non-operatively. She worked with physical therapy and has largely recovered from her injury. She was ambulating with a cane. During her last follow up, she complained of constant pain in her legs and feet (at rest and when ambulating). KEREN were 0.96/0.86 (right) and 0.74/0.73 (left). We discussed proceeding with angiography, however, she deferred the intervention. \par Following her last visit and approximately one month ago, Mrs. Harrell underwent colon resection and ostomy creation (LAR) for colorectal malignancy. During her hospitalization, she developed pain and coolness of the right lower extremity. She was placed on therapeutic Eliquis. She then presented to [facility] with hemoptysis and was found to have Hgb 3, as well as ischemic changes in the right foot. She was transferred to [facility], where she underwent EGD with GI and inguinal hernia repair with GS. Once she was deemed at acceptable risk for anticoagulation, right leg angiography was performed. 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 PTA of the PT was performed. She has since been discharged home. She is currently taking Aspirin 81mg daily and Eliquis 2.5mg BID. She has ischemic changes of the right 1st toe (dry gangrene), blistering of the 2nd toe and mild ischemic changes of the 5th toe. She was evaluated at the [facility] today for a decision for hyperbaric oxygen therapy.\par \par PMH: Raynaud's, SLE, HTN, rectal cancer, pacemaker\par \par Medications: Lipitor, Carafate, Aspirin, Clarithromycin, Metoprolol, Flagyl, Protonix, Eliquis\par \par Allergies: ACEI, Irbesartan, Norvasc, Amoxicillin\par \par Social history: Never smoker\par \par FH: NC\par

## 2023-03-14 NOTE — ASSESSMENT
[FreeTextEntry1] : In summary, Mrs. Bogdanowicz p/w RLE PAD/Raynaud's/SLE. KEREN today were 0.53/0.47 (right) and 0.73/0.6 (left). \par -She should continue Aspirin and Eliquis as prescribed\par -I instructed her to restart Lipitor\par -I will reach out to her Cardiologist Dr. Grissom at [facility] ([phone]) to inquire whether she has any contraindication to starting Cilostazol\par -She will follow up with the [facility] for hyperbaric oxygen therapy; she is to apply hamlet to the foot wounds\par -She will follow up with Rheumatology to inquire about any additional treatment needed for Raynaud's/SLE\par -She will follow up once her [facility] evaluation is completed\par --aseptic debridemont with dermal currette down to sub q and not beyond with with hamlet and dsd right foot\par --f/u 2 weeks, continue with topical o2, will consider future application of orthobiolgics\par

## 2023-03-21 ENCOUNTER — APPOINTMENT (OUTPATIENT)
Dept: WOUND CARE | Facility: CLINIC | Age: 83
End: 2023-03-21
Payer: MEDICARE

## 2023-03-21 DIAGNOSIS — L97.512 NON-PRESSURE CHRONIC ULCER OF OTHER PART OF RIGHT FOOT WITH FAT LAYER EXPOSED: ICD-10-CM

## 2023-03-21 DIAGNOSIS — I73.9 PERIPHERAL VASCULAR DISEASE, UNSPECIFIED: ICD-10-CM

## 2023-03-21 PROCEDURE — 11042 DBRDMT SUBQ TIS 1ST 20SQCM/<: CPT

## 2023-03-21 RX ORDER — MUPIROCIN 20 MG/G
2 OINTMENT TOPICAL
Qty: 1 | Refills: 4 | Status: ACTIVE | COMMUNITY
Start: 2023-03-21 | End: 1900-01-01

## 2023-04-04 ENCOUNTER — APPOINTMENT (OUTPATIENT)
Dept: WOUND CARE | Facility: CLINIC | Age: 83
End: 2023-04-04

## 2023-04-08 PROBLEM — L97.512 ULCER OF RIGHT FOOT WITH FAT LAYER EXPOSED: Status: ACTIVE | Noted: 2023-03-07

## 2023-04-08 PROBLEM — I73.9 PVD (PERIPHERAL VASCULAR DISEASE): Status: ACTIVE | Noted: 2021-11-22

## 2023-04-08 NOTE — PHYSICAL EXAM
[Please See PDF for [employer]] : Please See PDF for [employer]. [de-identified] : On physical examination the patient is in no acute distress and neurologically intact. The lungs are clear to auscultation and the heart has a regular rate and rhythm. Abdomen- ostomy WNL. Bilateral femoral pulses are palpable. Pedal pulses are non-palpable. Right ankle with some edema. Dry gangrene of 1st distal toe. Ulceration/blistering of 1st webspace/2nd toe, minimal ischemic changes of 5th toe. Small area of skin breakdown over 1st metatarsal head.

## 2023-04-08 NOTE — HISTORY OF PRESENT ILLNESS
[FreeTextEntry1] : I have just had the pleasure of seeing Mrs. Katie Harrell in follow up for lower extremity arterial insufficiency. Mrs. Harrell is a delightful 81-year-old lady who initially presented with a longstanding history of lower extremity venous insufficiency. On 4/3/2018, she underwent right GSV RFA and stab phlebectomy. Follow up ultrasonography demonstrated a closed GSV, without evidence of DVT. Following this, Mrs. Harrell fractured her pelvis (late 2018). This was managed non-operatively. She worked with physical therapy and has largely recovered from her injury. She was ambulating with a cane. During her last follow up, she complained of constant pain in her legs and feet (at rest and when ambulating). KEREN were 0.96/0.86 (right) and 0.74/0.73 (left). We discussed proceeding with angiography, however, she deferred the intervention. \par Following her last visit and approximately one month ago, Mrs. Harrell underwent colon resection and ostomy creation (LAR) for colorectal malignancy. During her hospitalization, she developed pain and coolness of the right lower extremity. She was placed on therapeutic Eliquis. She then presented to [facility] with hemoptysis and was found to have Hgb 3, as well as ischemic changes in the right foot. She was transferred to [facility], where she underwent EGD with GI and inguinal hernia repair with GS. Once she was deemed at acceptable risk for anticoagulation, right leg angiography was performed. 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 PTA of the PT was performed. She has since been discharged home. She is currently taking Aspirin 81mg daily and Eliquis 2.5mg BID. She has ischemic changes of the right 1st toe (dry gangrene), blistering of the 2nd toe and mild ischemic changes of the 5th toe. She was evaluated at the [facility] today for a decision for hyperbaric oxygen therapy.\par \par PMH: Raynaud's, SLE, HTN, rectal cancer, pacemaker\par \par Medications: Lipitor, Carafate, Aspirin, Clarithromycin, Metoprolol, Flagyl, Protonix, Eliquis\par \par Allergies: ACEI, Irbesartan, Norvasc, Amoxicillin\par \par Social history: Never smoker\par \par FH: NC\par

## 2023-04-08 NOTE — ASSESSMENT
[FreeTextEntry1] : In summary, Mrs. Bogdanowicz p/w RLE PAD/Raynaud's/SLE. KEREN today were 0.53/0.47 (right) and 0.73/0.6 (left). \par -She should continue Aspirin and Eliquis as prescribed\par -I instructed her to restart Lipitor\par -I will reach out to her Cardiologist Dr. Grissom at [facility] ([phone]) to inquire whether she has any contraindication to starting Cilostazol\par -She will follow up with Rheumatology to inquire about any additional treatment needed for Raynaud's/SLE\par -She will follow up once her [facility] evaluation is completed\par --aseptic debridemont with dermal currette down to sub q and not beyond with with hamlet and dsd right foot\par --f/u 2 weeks, continue with topical o2, will consider future application of orthobiolgics\par

## 2023-04-18 ENCOUNTER — APPOINTMENT (OUTPATIENT)
Dept: WOUND CARE | Facility: CLINIC | Age: 83
End: 2023-04-18

## 2023-05-02 ENCOUNTER — APPOINTMENT (OUTPATIENT)
Dept: WOUND CARE | Facility: CLINIC | Age: 83
End: 2023-05-02

## 2023-05-16 ENCOUNTER — APPOINTMENT (OUTPATIENT)
Dept: WOUND CARE | Facility: CLINIC | Age: 83
End: 2023-05-16

## 2023-05-30 ENCOUNTER — APPOINTMENT (OUTPATIENT)
Dept: WOUND CARE | Facility: CLINIC | Age: 83
End: 2023-05-30

## 2024-09-13 NOTE — ED PROVIDER NOTE - LANGUAGE ASSISTANCE NEEDED
Labs stable, hemoglobin a little better than previous, will review in more detail at upcoming appt.
No-Patient/Caregiver offered and refused free interpretation services.

## 2025-01-23 NOTE — PATIENT PROFILE ADULT - LIVING ENVIRONMENT
Reason To Defer Override: pt just finished Imiquimod treatment. Agrees to let heal and follow up in 1 month. Introduction Text (Please End With A Colon): The following procedure was deferred:Mohs surgery- area will heal and then be evaluated Size Of Lesion In Cm (Optional): 0 Detail Level: Detailed Procedure To Be Performed At Next Visit: Mohs surgery no

## 2025-04-29 NOTE — ED PROVIDER NOTE - NSTIMEPROVIDERCAREINITIATE_GEN_ER
Recent Visits  Date Type Provider Dept   11/04/24 Office Visit Nathaniel Powell MD [facility]   05/29/24 Office Visit Nathaniel Powell MD [facility]   Showing recent visits within past 540 days with a meds authorizing provider and meeting all other requirements  Future Appointments  Date Type Provider Dept   05/05/25 Appointment Nathaniel Powell MD [facility]   Showing future appointments within next 150 days with a meds authorizing provider and meeting all other requirements     11/4/2024      12-Mar-2018 18:31